# Patient Record
Sex: FEMALE | Race: WHITE | NOT HISPANIC OR LATINO | Employment: UNEMPLOYED | URBAN - METROPOLITAN AREA
[De-identification: names, ages, dates, MRNs, and addresses within clinical notes are randomized per-mention and may not be internally consistent; named-entity substitution may affect disease eponyms.]

---
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
## 2022-02-13 ENCOUNTER — APPOINTMENT (OUTPATIENT)
Dept: RADIOLOGY | Facility: HOSPITAL | Age: 22
End: 2022-02-13
Attending: INTERNAL MEDICINE
Payer: COMMERCIAL

## 2022-02-13 ENCOUNTER — HOSPITAL ENCOUNTER (INPATIENT)
Facility: HOSPITAL | Age: 22
LOS: 2 days | Discharge: HOME | End: 2022-02-15
Attending: EMERGENCY MEDICINE | Admitting: INTERNAL MEDICINE
Payer: COMMERCIAL

## 2022-02-13 ENCOUNTER — APPOINTMENT (OUTPATIENT)
Dept: CT IMAGING | Facility: HOSPITAL | Age: 22
End: 2022-02-13
Attending: INTERNAL MEDICINE
Payer: COMMERCIAL

## 2022-02-13 DIAGNOSIS — R00.0 SINUS TACHYCARDIA: ICD-10-CM

## 2022-02-13 DIAGNOSIS — E87.29 HIGH ANION GAP METABOLIC ACIDOSIS: ICD-10-CM

## 2022-02-13 DIAGNOSIS — E16.2 HYPOGLYCEMIA: ICD-10-CM

## 2022-02-13 DIAGNOSIS — K92.2 UGIB (UPPER GASTROINTESTINAL BLEED): ICD-10-CM

## 2022-02-13 DIAGNOSIS — E87.20 LACTIC ACIDOSIS: ICD-10-CM

## 2022-02-13 DIAGNOSIS — E87.29 ALCOHOLIC KETOACIDOSIS: Primary | ICD-10-CM

## 2022-02-13 LAB
ABO/RH(D): NORMAL
ALBUMIN SERPL-MCNC: 5.3 G/DL (ref 3.5–5)
ALBUMIN UR-MCNC: 20 MG/DL
ALP SERPL-CCNC: 115 U/L (ref 45–120)
ALT SERPL W P-5'-P-CCNC: 39 U/L (ref 0–45)
AMORPH CRY #/AREA URNS HPF: ABNORMAL /HPF
AMPHETAMINES UR QL SCN: ABNORMAL
ANION GAP SERPL CALCULATED.3IONS-SCNC: 31 MMOL/L (ref 5–18)
ANTIBODY SCREEN: NEGATIVE
APPEARANCE UR: CLEAR
APTT PPP: 25 SECONDS (ref 22–38)
AST SERPL W P-5'-P-CCNC: 60 U/L (ref 0–40)
BACTERIA #/AREA URNS HPF: ABNORMAL /HPF
BARBITURATES UR QL: ABNORMAL
BASOPHILS # BLD AUTO: 0.1 10E3/UL (ref 0–0.2)
BASOPHILS NFR BLD AUTO: 0 %
BENZODIAZ UR QL: ABNORMAL
BILIRUB DIRECT SERPL-MCNC: 0.2 MG/DL
BILIRUB SERPL-MCNC: 0.4 MG/DL (ref 0–1)
BILIRUB UR QL STRIP: NEGATIVE
BUN SERPL-MCNC: 13 MG/DL (ref 8–22)
CALCIUM SERPL-MCNC: 9.7 MG/DL (ref 8.5–10.5)
CANNABINOIDS UR QL SCN: ABNORMAL
CHLORIDE BLD-SCNC: 102 MMOL/L (ref 98–107)
CO2 SERPL-SCNC: 10 MMOL/L (ref 22–31)
COCAINE UR QL: ABNORMAL
COLOR UR AUTO: COLORLESS
CREAT SERPL-MCNC: 0.94 MG/DL (ref 0.6–1.1)
CREAT UR-MCNC: 33 MG/DL
EOSINOPHIL # BLD AUTO: 0 10E3/UL (ref 0–0.7)
EOSINOPHIL NFR BLD AUTO: 0 %
ERYTHROCYTE [DISTWIDTH] IN BLOOD BY AUTOMATED COUNT: 12.5 % (ref 10–15)
ETHANOL SERPL-MCNC: 31 MG/DL
GFR SERPL CREATININE-BSD FRML MDRD: 89 ML/MIN/1.73M2
GLUCOSE BLD-MCNC: 51 MG/DL (ref 70–125)
GLUCOSE BLDC GLUCOMTR-MCNC: 153 MG/DL (ref 70–99)
GLUCOSE UR STRIP-MCNC: 30 MG/DL
HCG SERPL QL: NEGATIVE
HCT VFR BLD AUTO: 45.5 % (ref 35–47)
HGB BLD-MCNC: 12.3 G/DL (ref 11.7–15.7)
HGB BLD-MCNC: 14.3 G/DL (ref 11.7–15.7)
HGB UR QL STRIP: ABNORMAL
HOLD SPECIMEN: NORMAL
IMM GRANULOCYTES # BLD: 0.2 10E3/UL
IMM GRANULOCYTES NFR BLD: 1 %
INR PPP: 1.07 (ref 0.9–1.15)
KETONES UR STRIP-MCNC: 150 MG/DL
LACTATE SERPL-SCNC: 3.9 MMOL/L (ref 0.7–2)
LACTATE SERPL-SCNC: 9.8 MMOL/L (ref 0.7–2)
LEUKOCYTE ESTERASE UR QL STRIP: NEGATIVE
LIPASE SERPL-CCNC: 28 U/L (ref 0–52)
LYMPHOCYTES # BLD AUTO: 1.8 10E3/UL (ref 0.8–5.3)
LYMPHOCYTES NFR BLD AUTO: 9 %
MAGNESIUM SERPL-MCNC: 2.4 MG/DL (ref 1.8–2.6)
MCH RBC QN AUTO: 32.6 PG (ref 26.5–33)
MCHC RBC AUTO-ENTMCNC: 31.4 G/DL (ref 31.5–36.5)
MCV RBC AUTO: 104 FL (ref 78–100)
MONOCYTES # BLD AUTO: 1.3 10E3/UL (ref 0–1.3)
MONOCYTES NFR BLD AUTO: 6 %
MUCOUS THREADS #/AREA URNS LPF: PRESENT /LPF
NEUTROPHILS # BLD AUTO: 17.6 10E3/UL (ref 1.6–8.3)
NEUTROPHILS NFR BLD AUTO: 84 %
NITRATE UR QL: NEGATIVE
NRBC # BLD AUTO: 0 10E3/UL
NRBC BLD AUTO-RTO: 0 /100
OPIATES UR QL SCN: ABNORMAL
OXYCODONE UR QL: ABNORMAL
PCP UR QL SCN: ABNORMAL
PH UR STRIP: 5.5 [PH] (ref 5–7)
PHOSPHATE SERPL-MCNC: 6.8 MG/DL (ref 2.5–4.5)
PLATELET # BLD AUTO: 362 10E3/UL (ref 150–450)
POTASSIUM BLD-SCNC: 4.3 MMOL/L (ref 3.5–5)
PROT SERPL-MCNC: 8.6 G/DL (ref 6–8)
RBC # BLD AUTO: 4.38 10E6/UL (ref 3.8–5.2)
RBC URINE: 1 /HPF
SARS-COV-2 RNA RESP QL NAA+PROBE: NEGATIVE
SODIUM SERPL-SCNC: 143 MMOL/L (ref 136–145)
SP GR UR STRIP: 1.02 (ref 1–1.03)
SPECIMEN EXPIRATION DATE: NORMAL
SQUAMOUS EPITHELIAL: 3 /HPF
TRANSITIONAL EPI: <1 /HPF
TSH SERPL DL<=0.005 MIU/L-ACNC: 0.56 UIU/ML (ref 0.3–5)
UROBILINOGEN UR STRIP-MCNC: <2 MG/DL
WBC # BLD AUTO: 20.8 10E3/UL (ref 4–11)
WBC URINE: 3 /HPF

## 2022-02-13 PROCEDURE — 86901 BLOOD TYPING SEROLOGIC RH(D): CPT | Performed by: EMERGENCY MEDICINE

## 2022-02-13 PROCEDURE — 96366 THER/PROPH/DIAG IV INF ADDON: CPT

## 2022-02-13 PROCEDURE — 258N000001 HC RX 258: Performed by: EMERGENCY MEDICINE

## 2022-02-13 PROCEDURE — 99223 1ST HOSP IP/OBS HIGH 75: CPT | Performed by: INTERNAL MEDICINE

## 2022-02-13 PROCEDURE — 80307 DRUG TEST PRSMV CHEM ANLYZR: CPT | Performed by: INTERNAL MEDICINE

## 2022-02-13 PROCEDURE — 258N000003 HC RX IP 258 OP 636: Performed by: EMERGENCY MEDICINE

## 2022-02-13 PROCEDURE — 87040 BLOOD CULTURE FOR BACTERIA: CPT | Performed by: EMERGENCY MEDICINE

## 2022-02-13 PROCEDURE — 96365 THER/PROPH/DIAG IV INF INIT: CPT | Mod: 59

## 2022-02-13 PROCEDURE — 85018 HEMOGLOBIN: CPT | Performed by: INTERNAL MEDICINE

## 2022-02-13 PROCEDURE — 82077 ASSAY SPEC XCP UR&BREATH IA: CPT | Performed by: EMERGENCY MEDICINE

## 2022-02-13 PROCEDURE — 85730 THROMBOPLASTIN TIME PARTIAL: CPT | Performed by: EMERGENCY MEDICINE

## 2022-02-13 PROCEDURE — 84443 ASSAY THYROID STIM HORMONE: CPT | Performed by: INTERNAL MEDICINE

## 2022-02-13 PROCEDURE — 258N000003 HC RX IP 258 OP 636: Performed by: INTERNAL MEDICINE

## 2022-02-13 PROCEDURE — 84100 ASSAY OF PHOSPHORUS: CPT | Performed by: INTERNAL MEDICINE

## 2022-02-13 PROCEDURE — 87635 SARS-COV-2 COVID-19 AMP PRB: CPT | Performed by: EMERGENCY MEDICINE

## 2022-02-13 PROCEDURE — 85025 COMPLETE CBC W/AUTO DIFF WBC: CPT | Performed by: EMERGENCY MEDICINE

## 2022-02-13 PROCEDURE — 96361 HYDRATE IV INFUSION ADD-ON: CPT

## 2022-02-13 PROCEDURE — 85610 PROTHROMBIN TIME: CPT | Performed by: EMERGENCY MEDICINE

## 2022-02-13 PROCEDURE — 83690 ASSAY OF LIPASE: CPT | Performed by: EMERGENCY MEDICINE

## 2022-02-13 PROCEDURE — 81001 URINALYSIS AUTO W/SCOPE: CPT | Performed by: INTERNAL MEDICINE

## 2022-02-13 PROCEDURE — 250N000011 HC RX IP 250 OP 636: Performed by: EMERGENCY MEDICINE

## 2022-02-13 PROCEDURE — 250N000013 HC RX MED GY IP 250 OP 250 PS 637: Performed by: INTERNAL MEDICINE

## 2022-02-13 PROCEDURE — 93005 ELECTROCARDIOGRAM TRACING: CPT | Performed by: EMERGENCY MEDICINE

## 2022-02-13 PROCEDURE — 120N000001 HC R&B MED SURG/OB

## 2022-02-13 PROCEDURE — 36415 COLL VENOUS BLD VENIPUNCTURE: CPT | Performed by: EMERGENCY MEDICINE

## 2022-02-13 PROCEDURE — 99285 EMERGENCY DEPT VISIT HI MDM: CPT | Mod: 25

## 2022-02-13 PROCEDURE — 84703 CHORIONIC GONADOTROPIN ASSAY: CPT | Performed by: STUDENT IN AN ORGANIZED HEALTH CARE EDUCATION/TRAINING PROGRAM

## 2022-02-13 PROCEDURE — C9803 HOPD COVID-19 SPEC COLLECT: HCPCS

## 2022-02-13 PROCEDURE — 83735 ASSAY OF MAGNESIUM: CPT | Performed by: INTERNAL MEDICINE

## 2022-02-13 PROCEDURE — C9113 INJ PANTOPRAZOLE SODIUM, VIA: HCPCS | Performed by: EMERGENCY MEDICINE

## 2022-02-13 PROCEDURE — 250N000011 HC RX IP 250 OP 636: Performed by: INTERNAL MEDICINE

## 2022-02-13 PROCEDURE — 74177 CT ABD & PELVIS W/CONTRAST: CPT

## 2022-02-13 PROCEDURE — 71045 X-RAY EXAM CHEST 1 VIEW: CPT

## 2022-02-13 PROCEDURE — 36415 COLL VENOUS BLD VENIPUNCTURE: CPT | Performed by: INTERNAL MEDICINE

## 2022-02-13 PROCEDURE — 83605 ASSAY OF LACTIC ACID: CPT | Performed by: EMERGENCY MEDICINE

## 2022-02-13 PROCEDURE — 250N000009 HC RX 250: Performed by: INTERNAL MEDICINE

## 2022-02-13 PROCEDURE — 82248 BILIRUBIN DIRECT: CPT | Performed by: EMERGENCY MEDICINE

## 2022-02-13 RX ORDER — OLANZAPINE 5 MG/1
5-10 TABLET, ORALLY DISINTEGRATING ORAL EVERY 6 HOURS PRN
Status: DISCONTINUED | OUTPATIENT
Start: 2022-02-13 | End: 2022-02-15 | Stop reason: HOSPADM

## 2022-02-13 RX ORDER — ONDANSETRON 4 MG/1
4 TABLET, ORALLY DISINTEGRATING ORAL EVERY 6 HOURS PRN
Status: DISCONTINUED | OUTPATIENT
Start: 2022-02-13 | End: 2022-02-15 | Stop reason: HOSPADM

## 2022-02-13 RX ORDER — FLUMAZENIL 0.1 MG/ML
0.2 INJECTION, SOLUTION INTRAVENOUS
Status: DISCONTINUED | OUTPATIENT
Start: 2022-02-13 | End: 2022-02-15 | Stop reason: HOSPADM

## 2022-02-13 RX ORDER — TRAZODONE HYDROCHLORIDE 50 MG/1
50 TABLET, FILM COATED ORAL
COMMUNITY
End: 2022-12-20

## 2022-02-13 RX ORDER — ACETAMINOPHEN 325 MG/1
650 TABLET ORAL EVERY 6 HOURS PRN
Status: DISCONTINUED | OUTPATIENT
Start: 2022-02-13 | End: 2022-02-15 | Stop reason: HOSPADM

## 2022-02-13 RX ORDER — IBUPROFEN 200 MG
200-400 TABLET ORAL EVERY 6 HOURS PRN
Status: ON HOLD | COMMUNITY
End: 2022-02-15

## 2022-02-13 RX ORDER — LIDOCAINE 40 MG/G
CREAM TOPICAL
Status: DISCONTINUED | OUTPATIENT
Start: 2022-02-13 | End: 2022-02-15 | Stop reason: HOSPADM

## 2022-02-13 RX ORDER — LORAZEPAM 0.5 MG/1
1-2 TABLET ORAL EVERY 30 MIN PRN
Status: DISCONTINUED | OUTPATIENT
Start: 2022-02-13 | End: 2022-02-15 | Stop reason: HOSPADM

## 2022-02-13 RX ORDER — CEFTRIAXONE 1 G/1
1 INJECTION, POWDER, FOR SOLUTION INTRAMUSCULAR; INTRAVENOUS ONCE
Status: COMPLETED | OUTPATIENT
Start: 2022-02-13 | End: 2022-02-13

## 2022-02-13 RX ORDER — LORAZEPAM 2 MG/ML
1-2 INJECTION INTRAMUSCULAR EVERY 30 MIN PRN
Status: DISCONTINUED | OUTPATIENT
Start: 2022-02-13 | End: 2022-02-15 | Stop reason: HOSPADM

## 2022-02-13 RX ORDER — ONDANSETRON 2 MG/ML
4 INJECTION INTRAMUSCULAR; INTRAVENOUS ONCE
Status: COMPLETED | OUTPATIENT
Start: 2022-02-13 | End: 2022-02-13

## 2022-02-13 RX ORDER — ONDANSETRON 2 MG/ML
4 INJECTION INTRAMUSCULAR; INTRAVENOUS EVERY 6 HOURS PRN
Status: DISCONTINUED | OUTPATIENT
Start: 2022-02-13 | End: 2022-02-15 | Stop reason: HOSPADM

## 2022-02-13 RX ORDER — HALOPERIDOL 5 MG/ML
2.5-5 INJECTION INTRAMUSCULAR EVERY 6 HOURS PRN
Status: DISCONTINUED | OUTPATIENT
Start: 2022-02-13 | End: 2022-02-15 | Stop reason: HOSPADM

## 2022-02-13 RX ORDER — OXYCODONE HYDROCHLORIDE 5 MG/1
5 TABLET ORAL EVERY 4 HOURS PRN
Status: DISCONTINUED | OUTPATIENT
Start: 2022-02-13 | End: 2022-02-15 | Stop reason: HOSPADM

## 2022-02-13 RX ORDER — DEXTROSE MONOHYDRATE 25 G/50ML
50 INJECTION, SOLUTION INTRAVENOUS ONCE
Status: COMPLETED | OUTPATIENT
Start: 2022-02-13 | End: 2022-02-13

## 2022-02-13 RX ORDER — SODIUM CHLORIDE 9 MG/ML
INJECTION, SOLUTION INTRAVENOUS CONTINUOUS
Status: DISCONTINUED | OUTPATIENT
Start: 2022-02-13 | End: 2022-02-15

## 2022-02-13 RX ORDER — CEFTRIAXONE 1 G/1
1 INJECTION, POWDER, FOR SOLUTION INTRAMUSCULAR; INTRAVENOUS EVERY 24 HOURS
Status: DISCONTINUED | OUTPATIENT
Start: 2022-02-14 | End: 2022-02-15

## 2022-02-13 RX ORDER — IOPAMIDOL 755 MG/ML
100 INJECTION, SOLUTION INTRAVASCULAR ONCE
Status: COMPLETED | OUTPATIENT
Start: 2022-02-13 | End: 2022-02-13

## 2022-02-13 RX ORDER — TRAZODONE HYDROCHLORIDE 50 MG/1
50 TABLET, FILM COATED ORAL
Status: DISCONTINUED | OUTPATIENT
Start: 2022-02-13 | End: 2022-02-15 | Stop reason: HOSPADM

## 2022-02-13 RX ORDER — ACETAMINOPHEN 650 MG/1
650 SUPPOSITORY RECTAL EVERY 6 HOURS PRN
Status: DISCONTINUED | OUTPATIENT
Start: 2022-02-13 | End: 2022-02-15 | Stop reason: HOSPADM

## 2022-02-13 RX ADMIN — ACETAMINOPHEN 650 MG: 325 TABLET ORAL at 17:42

## 2022-02-13 RX ADMIN — SODIUM CHLORIDE, POTASSIUM CHLORIDE, SODIUM LACTATE AND CALCIUM CHLORIDE 1000 ML: 600; 310; 30; 20 INJECTION, SOLUTION INTRAVENOUS at 15:17

## 2022-02-13 RX ADMIN — DEXTROSE MONOHYDRATE 50 ML: 25 INJECTION, SOLUTION INTRAVENOUS at 15:31

## 2022-02-13 RX ADMIN — OXYCODONE HYDROCHLORIDE 5 MG: 5 TABLET ORAL at 17:47

## 2022-02-13 RX ADMIN — SODIUM CHLORIDE, POTASSIUM CHLORIDE, SODIUM LACTATE AND CALCIUM CHLORIDE 1000 ML: 600; 310; 30; 20 INJECTION, SOLUTION INTRAVENOUS at 14:24

## 2022-02-13 RX ADMIN — THIAMINE HYDROCHLORIDE: 100 INJECTION, SOLUTION INTRAMUSCULAR; INTRAVENOUS at 20:05

## 2022-02-13 RX ADMIN — ONDANSETRON 4 MG: 2 INJECTION INTRAMUSCULAR; INTRAVENOUS at 14:33

## 2022-02-13 RX ADMIN — SODIUM CHLORIDE 8 MG/HR: 9 INJECTION, SOLUTION INTRAVENOUS at 14:45

## 2022-02-13 RX ADMIN — PANTOPRAZOLE SODIUM 40 MG: 40 INJECTION, POWDER, FOR SOLUTION INTRAVENOUS at 14:35

## 2022-02-13 RX ADMIN — SODIUM CHLORIDE: 9 INJECTION, SOLUTION INTRAVENOUS at 17:46

## 2022-02-13 RX ADMIN — IOPAMIDOL 100 ML: 755 INJECTION, SOLUTION INTRAVENOUS at 18:52

## 2022-02-13 RX ADMIN — CEFTRIAXONE SODIUM 1 G: 1 INJECTION, POWDER, FOR SOLUTION INTRAMUSCULAR; INTRAVENOUS at 16:38

## 2022-02-13 ASSESSMENT — ACTIVITIES OF DAILY LIVING (ADL)
ADLS_ACUITY_SCORE: 12
DEPENDENT_IADLS:: TRANSPORTATION
ADLS_ACUITY_SCORE: 12

## 2022-02-13 ASSESSMENT — MIFFLIN-ST. JEOR: SCORE: 1213.6

## 2022-02-13 NOTE — PHARMACY-ADMISSION MEDICATION HISTORY
Pharmacy Note - Admission Medication History    Pertinent Provider Information: none     ______________________________________________________________________    Prior To Admission (PTA) med list completed and updated in EMR.       PTA Med List   Medication Sig Last Dose     ibuprofen (ADVIL/MOTRIN) 200 MG tablet Take 200-400 mg by mouth every 6 hours as needed for mild pain Past Month at Unknown time     omeprazole (PRILOSEC) 20 MG DR capsule Take 20 mg by mouth daily as needed Past Week at Unknown time     traZODone (DESYREL) 50 MG tablet Take 50 mg by mouth nightly as needed for sleep Past Week at Unknown time       Information source(s): [facility]  Method of interview communication: in-person    Summary of Changes to PTA Med List  New: prn use of omeprazole, trazodone and ibu  Discontinued: Xanax  Changed: none    Patient was asked about OTC/herbal products specifically.  PTA med list reflects this.    In the past week, patient estimated taking medication this percent of the time:  All prn    Allergies were reviewed, assessed, and updated with the patient.      Patient does not use any multi-dose medications prior to admission.    The information provided in this note is only as accurate as the sources available at the time of the update(s).    Thank you for the opportunity to participate in the care of this patient.    Reyna Dior [facility]  2/13/2022 4:20 PM

## 2022-02-13 NOTE — ED PROVIDER NOTES
EMERGENCY DEPARTMENT ENCOUNTER      NAME: Julita Fernandez  AGE: 21 year old female  YOB: 2000  MRN: [id number]  EVALUATION DATE & TIME: 2/13/2022  1:59 PM    PCP: Brooklynn Lazcano    ED PROVIDER: Oralia Kumar MD    Chief Complaint   Patient presents with     Hematemesis         FINAL IMPRESSION:  1. Alcoholic ketoacidosis    2. Sinus tachycardia    3. Lactic acidosis    4. UGIB (upper gastrointestinal bleed)    5. High anion gap metabolic acidosis    6. Hypoglycemia          ED COURSE & MEDICAL DECISION MAKING:    Pertinent Labs & Imaging studies reviewed. (See chart for details)  21 year old female with history of GERD, anxiety who presents to the Emergency Department for evaluation of palpitations, nausea vomiting and now hematemesis after drinking alcohol heavily last night.  Differential includes dehydration, electrolyte abnormality, alcoholic gastritis.  Patient describes initially vomiting clear to yellow stomach acid and as she has been puking throughout the morning developing some hematemesis.  Irma-Ro tears on the differential but she is not having any bright red hematemesis here, having only small scant coffee-ground emesis.    Patient placed on monitor, given 2 L LR bolus, 8 mg Zofran, IV PPI bolus and drip.  CBC, BMP, LFTs, lipase, blood alcohol level, coags, type and screen, lactate notable for WBC of 20.8, lactate of 9.8.  Blood cultures sent and covered with Rocephin.  Glucose of 51.  Anion gap acidosis with bicarb of 10, gap of 21.  I suspect much of patient's laboratory abnormalities are related to dehydration, stress demargination, and alcoholic ketoacidosis.  That being said I do think patient warrants admission for the above, additional monitoring for upper GI bleed.  Heart rate normalizing after the above.  Repeat lactate pending.  Will be admitted to medicine for further management.        ED Course as of 02/13/22 1617   Sun Feb 13, 2022   1435 Lactic Acid(!!): 9.8    1438 WBC(!): 20.8   1449 5ml coffee ground emesis   1450 Feels improved, HR down into 110s   1502 Glucose(!!): 51   1502 Hemoglobin: 14.3   1502 Alcohol, Blood(!): 31   1502 Carbon Dioxide(!): 10   1502 Anion Gap(!): 31   1617 Admitted to Dr. Alcantar       2:05 PM I met with the patient, obtained history, performed an initial exam, and discussed options and plan for diagnostics and treatment here in the ED. PPE worn: N95 mask, eye protection, gloves    At the conclusion of the encounter I discussed the results of all of the tests and the disposition. The questions were answered. The patient or family acknowledged understanding and was agreeable with the care plan.    CRITICAL CARE:  Critical Care  Performed by: Oralia Kumar MD  Authorized by: Oralia Kumar MD     Total critical care time: 44 minutes  Criticalcare time was exclusive of separately billable procedures and treating other patients.  Critical care was necessary to treat or prevent imminent or life-threatening deterioration of the following conditions: Metabolic decompensation, death, disability  Critical care was time spent personally by me on the following activities: development of treatment plan with patient or surrogate, discussions with consultants, examination of patient, evaluation of patient's response totreatment, obtaining history from patient or surrogate, ordering and performing treatments and interventions, ordering and review of laboratory studies, ordering and review of radiographic studies and re-evaluation ofpatient's condition, this excludes any separately billable procedures.      MEDICATIONS GIVEN IN THE EMERGENCY:  Medications   pantoprazole (PROTONIX) 80 mg in sodium chloride 0.9 % 100 mL infusion (8 mg/hr Intravenous New Bag 2/13/22 1445)   cefTRIAXone (ROCEPHIN) 1 g vial to attach to  mL bag for ADULTS or NS 50 mL bag for PEDS (has no administration in time range)   lactated ringers BOLUS 1,000 mL (0 mLs Intravenous  Stopped 2/13/22 1509)   pantoprazole (PROTONIX) IV push injection 40 mg (40 mg Intravenous Given 2/13/22 1435)   ondansetron (ZOFRAN) injection 4 mg (4 mg Intravenous Given 2/13/22 1433)   lactated ringers BOLUS 1,000 mL (1,000 mLs Intravenous New Bag 2/13/22 1517)   dextrose 50 % injection 50 mL (50 mLs Intravenous Given 2/13/22 1531)       NEW PRESCRIPTIONS STARTED AT TODAY'S ER VISIT  New Prescriptions    No medications on file          =================================================================    HPI    Patient information was obtained from: Patient     Use of Intrepreter: N/A        Julita Fernandez is a 21 year old female with pertinent medical history of GERD who presents hematemesis.    The patient reports going to [employer] in [place] this past semester where she heavily drank alcohol on a daily basis. She moved back here in December 2021 and has not drank since. However, yesterday night, the patient states drinking half a liter of vodka. She went to sleep with no complications, but when she woke up this morning, she felt intoxicated and nauseated. The patient reports of 10-15 episodes of emesis. Approximately one hour prior to arrival, the patient started having hematemesis. Initially the patient thought that she was vomiting the strawberry she recently consumed, but noted that she only ate one strawberry, thus the concern of hematemesis. The patient has had approximately 15 mL of hematemesis here in the ED. The patient has had a history of GERD, but is not currently on any medication since it has been controlled. However, the patient does report taking omeprazole this morning, but has vomited the medication. No other symptoms or complaints at this time.       REVIEW OF SYSTEMS  Constitutional:  Denies fever, chills, weight loss or weakness  Respiratory: No SOB, wheeze or cough  Cardiovascular:  No CP, palpitations  GI: Positive for nausea and vomiting. Positive for hematemesis. Denies abdominal pain,  "diarrhea  Musculoskeletal:  Denies any new muscle/joint pain, swelling or loss of function.  Neurologic:  Denies headache, focal weakness or sensory changes    All other systems negative unless noted in HPI.      PAST MEDICAL HISTORY:  Past Medical History:   Diagnosis Date     Anxiety      Gastroesophageal reflux disease        PAST SURGICAL HISTORY:  History reviewed. No pertinent surgical history.    CURRENT MEDICATIONS:    Prior to Admission Medications   Prescriptions Last Dose Informant Patient Reported? Taking?   ALPRAZolam (XANAX) 0.5 MG tablet   No No   Sig: Take 1 tablet (0.5 mg) by mouth 2 times daily as needed for anxiety   levonorgestrel (MIRENA) 20 mcg/24 hours (5 yrs) 52 mg IUD   No No   Sig: [LEVONORGESTREL (MIRENA) 20 MCG/24 HOURS (5 YRS) 52 MG IUD] by Intrauterine route once for 1 dose.      Facility-Administered Medications: None       ALLERGIES:  Allergies   Allergen Reactions     Soy [Isoflavones] Unknown       FAMILY HISTORY:  History reviewed. No pertinent family history.    SOCIAL HISTORY:  Social History     Tobacco Use     Smoking status: Never Smoker     Smokeless tobacco: Never Used   Substance Use Topics     Alcohol use: None     Drug use: None        VITALS:  Patient Vitals for the past 24 hrs:   BP Temp Temp src Pulse Resp SpO2 Height Weight   02/13/22 1545 98/53 -- -- 113 27 100 % -- --   02/13/22 1531 -- -- -- 108 -- -- -- --   02/13/22 1500 106/58 -- -- 114 22 100 % -- --   02/13/22 1445 106/57 -- -- 116 27 100 % -- --   02/13/22 1430 101/56 -- -- 118 (!) 39 100 % -- --   02/13/22 1416 118/61 -- -- (!) 123 25 -- -- --   02/13/22 1335 106/61 98.2  F (36.8  C) Oral (!) 133 -- 99 % 1.626 m (5' 4\") 45.4 kg (100 lb)       PHYSICAL EXAM    General Appearance: Well-appearing, well-nourished, no acute distress, looks dehydrated   Head:  Normocephalic  Eyes:  conjunctiva/corneas clear  ENT:  membranes are moist without pallor  Neck:  Supple  Chest:  No tenderness or deformity, no " crepitus  Cardio: Tachycardic in the 120's. Regular rate, no murmur/gallop/rub, 2+ pulses symmetric in all extremities  Pulm:  No respiratory distress, clear to auscultation bilaterally  Abdomen:  Soft, non-tender, non distended,no rebound or guarding.  Extremities: Moves all extremities normally, normal gait  Skin:  Skin warm, dry, no rashes  Neuro:  Alert and oriented ×3, moving all extremities, no gross sensory defects     RADIOLOGY/LABS:  Reviewed all pertinent imaging. Please see official radiology report. All pertinent labs reviewed and interpreted.    Results for orders placed or performed during the hospital encounter of 02/13/22   Result Value Ref Range    INR 1.07 0.90 - 1.15   Partial thromboplastin time   Result Value Ref Range    aPTT 25 22 - 38 Seconds   Basic metabolic panel   Result Value Ref Range    Sodium 143 136 - 145 mmol/L    Potassium 4.3 3.5 - 5.0 mmol/L    Chloride 102 98 - 107 mmol/L    Carbon Dioxide (CO2) 10 (L) 22 - 31 mmol/L    Anion Gap 31 (H) 5 - 18 mmol/L    Urea Nitrogen 13 8 - 22 mg/dL    Creatinine 0.94 0.60 - 1.10 mg/dL    Calcium 9.7 8.5 - 10.5 mg/dL    Glucose 51 (LL) 70 - 125 mg/dL    GFR Estimate 89 >60 mL/min/1.73m2   Hepatic panel   Result Value Ref Range    Bilirubin Total 0.4 0.0 - 1.0 mg/dL    Bilirubin Direct 0.2 <=0.5 mg/dL    Protein Total 8.6 (H) 6.0 - 8.0 g/dL    Albumin 5.3 (H) 3.5 - 5.0 g/dL    Alkaline Phosphatase 115 45 - 120 U/L    AST 60 (H) 0 - 40 U/L    ALT 39 0 - 45 U/L   Result Value Ref Range    Lipase 28 0 - 52 U/L   Lactic acid whole blood   Result Value Ref Range    Lactic Acid 9.8 (HH) 0.7 - 2.0 mmol/L   Alcohol level blood   Result Value Ref Range    Alcohol, Blood 31 (H) None detected mg/dL   CBC with platelets and differential   Result Value Ref Range    WBC Count 20.8 (H) 4.0 - 11.0 10e3/uL    RBC Count 4.38 3.80 - 5.20 10e6/uL    Hemoglobin 14.3 11.7 - 15.7 g/dL    Hematocrit 45.5 35.0 - 47.0 %     (H) 78 - 100 fL    MCH 32.6 26.5 - 33.0  pg    MCHC 31.4 (L) 31.5 - 36.5 g/dL    RDW 12.5 10.0 - 15.0 %    Platelet Count 362 150 - 450 10e3/uL    % Neutrophils 84 %    % Lymphocytes 9 %    % Monocytes 6 %    % Eosinophils 0 %    % Basophils 0 %    % Immature Granulocytes 1 %    NRBCs per 100 WBC 0 <1 /100    Absolute Neutrophils 17.6 (H) 1.6 - 8.3 10e3/uL    Absolute Lymphocytes 1.8 0.8 - 5.3 10e3/uL    Absolute Monocytes 1.3 0.0 - 1.3 10e3/uL    Absolute Eosinophils 0.0 0.0 - 0.7 10e3/uL    Absolute Basophils 0.1 0.0 - 0.2 10e3/uL    Absolute Immature Granulocytes 0.2 <=0.4 10e3/uL    Absolute NRBCs 0.0 10e3/uL   Extra Red Top Tube   Result Value Ref Range    Hold Specimen [facility]    Adult Type and Screen   Result Value Ref Range    ABO/RH(D) A POS     Antibody Screen Negative Negative    SPECIMEN EXPIRATION DATE [id number]        EKG:  Performed at: 13-FEB-2022 14:23:16    Impression: Otherwise normal ECG    Rate: 121 bpm  Rhythm: Sinus tachycardia   Axis: 88  WY Interval: 122 ms  QRS Interval: 122 ms  QTc Interval: 477 ms  Comparison: No previous ECG  I have independently reviewed and interpreted the EKG(s) documented above.      The creation of this record is based on the scribe s observations of the work being performed by Oralia Kumar MD and the provider s statements to them. It was created on his behalf by Beka Main, a trained medical scribe. This document has been checked and approved by the attending provider.    Oralia Kumar MD  Emergency Medicine  [facility] EMERGENCY DEPARTMENT  [facility] [facility] [id number] [phone]  Dept: [phone]     Oralia Kumar MD  02/13/22 9992

## 2022-02-13 NOTE — H&P
[facility]    History and Physical - Hospitalist Service      Assessment and Plan  Active Problems:    Sinus tachycardia    Lactic acidosis    Alcoholic ketoacidosis    Hypoglycemia    High anion gap metabolic acidosis    UGIB (upper gastrointestinal bleed)      21 year old female with past medical history of  alcohol abuse, GERD and depression who presents to the ER for the evaluation of hematemesis.  She was admitted with    Severe lactic acidosis  - Probably combination of severe dehydration, hematemesis and alcohol dependence  - Patient presented with lactic acid 9.7 and looks extremely dry  - Received 2 L vigorous IV hydration in ER, lactic acid is improving to 3.9  - Continue IV fluid support  - Follow-up on blood culture  - Check urinalysis and chest x-ray  - Continue to monitor lactic acid level  - Continue IV Rocephin empirically    Nausea and vomiting with hematemesis  - Probably Irma-Ro tear secondary to vomiting versus GERD  - Stable hemoglobin on admission the patient is very dry  - Continue to check hemoglobin every 6 hours  - Started on Protonix drip in ER, continue  - Consider GI consult if hemoglobin drops  - Keep patient n.p.o. for now    Alcohol dependence  - Patient presented with alcohol level of 31  - Start CIWA protocol  - Continue IV fluid support  - Social service consult for chemical dependence  - Check urine drug screen    Abdominal pain  - Probably secondary to intractable nausea and vomiting  - Patient complains of back pain  - Normal lipase  - Check CT abdomen    Leukocytosis  - Probably reactive secondary to above  - Check chest x-ray and urinalysis  - Check CT abdomen  - Continue IV Rocephin empirically  - Follow-up on blood culture    Tachycardia  - Probably reactive to the above  - EKG shows sinus tachycardia  - Check TSH   - Continue to monitor on telemetry    History of depression  - Patient currently not take medications    Insomnia  - Resume  trazodone as needed.      COVID STATUS: Negative date: 2/13/2022    VTE prophylaxis:  Pneumatic Compression Devices  DIET: Orders Placed This Encounter      NPO for Medical/Clinical Reasons Except for: Ice Chips, Meds      Disposition/Barriers to discharge: IV fluid, antibiotics, pending culture  Code Status:Full Code    HPI:   Julita Fernandez is a 21 year old female with past medical history of  alcohol abuse, GERD and depression who presents to the ER for the evaluation of hematemesis.  Basically the patient reports going to [employer] in [place] this past semester where she heavily drank alcohol on a daily basis. She moved back here in December 2021 and has not drank since. However, yesterday night, the patient states drinking half a liter of vodka. She went to sleep with no complications, but when she woke up this morning, she felt intoxicated and nauseated. The patient reports of 10-15 episodes of emesis. Approximately one hour prior to arrival, the patient started having hematemesis. Initially the patient thought that she was vomiting the strawberry she recently consumed, but noted that she only ate one strawberry, thus the concern of hematemesis. The patient has had approximately 15 mL of hematemesis here in the ED. The patient has had a history of GERD, but is not currently on any medication since it has been controlled. However, the patient does report taking omeprazole this morning, but has vomited the medication. No other symptoms or complaints at this time.  In the ER patient was found to have lactic acid of 9.7      Past Medical History:   Diagnosis Date     Anxiety      Gastroesophageal reflux disease      History reviewed. No pertinent surgical history.  History reviewed. No pertinent family history.  Social History     Socioeconomic History     Marital status: Single     Spouse name: Not on file     Number of children: Not on file     Years of education: Not on file     Highest education level: Not on  file   Occupational History     Not on file   Tobacco Use     Smoking status: Never Smoker     Smokeless tobacco: Never Used   Substance and Sexual Activity     Alcohol use: Not on file     Drug use: Not on file     Sexual activity: Not on file   Other Topics Concern     Not on file   Social History Narrative     Not on file     Social Determinants of Health     Financial Resource Strain: Not on file   Food Insecurity: Not on file   Transportation Needs: Not on file   Physical Activity: Not on file   Stress: Not on file   Social Connections: Not on file   Intimate Partner Violence: Not on file   Housing Stability: Not on file     Allergies   Allergen Reactions     Soy [Isoflavones] Other (See Comments)     Facial eczema        PRIOR TO ADMISSION MEDICATIONS   (Not in a hospital admission)       REVIEW OF SYSTEMS:  12 point reviewed pertinent negatives and positives in HPI all others negative     PHYSICAL EXAM  B/P:105/56 T:98.2 P:115 R: 30     Head:    Normocephalic, without obvious abnormality, atraumatic   Eyes:    PERRL, conjunctiva/corneas clear, EOM's intact,both eyes    Ears:    Normal external ear canals no drainage or erythema bilat.   Nose:   Nares normal by gross inspection,  mucosa normal, no drainage or sinus tenderness   Throat:   Lips, mucosa, and tongue normal; teeth and gums normal   Neck:   Supple, symmetrical, trachea midline, no adenopathy;        thyroid:  No enlargement/tenderness/nodules   Back:     Symmetric, no curvature, ROM normal, no CVA tenderness   Lungs:    Decreased breath sounds on lung bases, scattered rhonchi.  No rales.   Chest wall:    No tenderness or deformity   Heart:    Regular rate and rhythm, S1 and S2 normal, I/VI systolic murmur, no rubs, no JVD, no edema   Abdomen:     Soft, non-tender, bowel sounds active all four quadrants,     no masses, no hepatosplenomegaly   Musculoskeletal:   Extremities are warm and non-tender, atraumatic, no joint swelling or tenderness   Pulses:    2+ and symmetric all extremities   Skin:   Skin color, texture, turgor normal, no rashes or lesions on exposed areas, please see nursing assessment for full skin assessment   Neurologic:  Grossly intact, no focal deficits         PERTINENT LABS and RADIOLOGY   Recent Labs   Lab 02/13/22  1617 02/13/22  1424   WBC  --  20.8*   HGB  --  14.3   MCV  --  104*   PLT  --  362   INR  --  1.07   NA  --  143   POTASSIUM  --  4.3   CHLORIDE  --  102   CO2  --  10*   BUN  --  13   CR  --  0.94   ANIONGAP  --  31*   SHREE  --  9.7   * 51*   ALBUMIN  --  5.3*   PROTTOTAL  --  8.6*   BILITOTAL  --  0.4   ALKPHOS  --  115   ALT  --  39   AST  --  60*   LIPASE  --  28     No results found for this or any previous visit (from the past 24 hour(s)).  EKG: Sinus tachycardia    Discussed with patient, family and nursing staff     Osorio Ramirez MD  [facility] Internal Medicine Hospitalist  [phone]

## 2022-02-13 NOTE — CONSULTS
"Care Management Initial Consult    General Information  Assessment completed with: PatientJulita via i pad  Type of CM/SW Visit: Initial Assessment    Primary Care Provider verified and updated as needed: Yes   Readmission within the last 30 days: no previous admission in last 30 days      Reason for Consult: discharge planning  Advance Care Planning: Advance Care Planning Reviewed: other (comment) (\"I don't have one written\")          Communication Assessment  Patient's communication style: spoken language (English or Bilingual)             Cognitive  Cognitive/Neuro/Behavioral: WDL                      Living Environment:   People in home: parent(s),sibling(s) (\"parents and 2 siblings\")     Current living Arrangements: house      Able to return to prior arrangements: yes       Family/Social Support:  Care provided by: self  Provides care for: no one     Parent(s)          Description of Support System: Supportive,Involved    Support Assessment: Adequate family and caregiver support,Adequate social supports,Patient communicates needs well met    Current Resources:   Patient receiving home care services: No     Community Resources: None  Equipment currently used at home: none  Supplies currently used at home: Other (\"glasses\")    Employment/Financial:  Employment Status:  (\"I was supposed to start  part time job today\")     Employment/ Comments: \"no  benefits\"  Financial Concerns:     Referral to Financial Counselor: No       Lifestyle & Psychosocial Needs:  Social Determinants of Health     Tobacco Use: Low Risk      Smoking Tobacco Use: Never Smoker     Smokeless Tobacco Use: Never Used   Alcohol Use: Not on file   Financial Resource Strain: Not on file   Food Insecurity: Not on file   Transportation Needs: Not on file   Physical Activity: Not on file   Stress: Not on file   Social Connections: Not on file   Intimate Partner Violence: Not on file   Depression: At risk     PHQ-2 Score: 4 " "  Housing Stability: Not on file       Functional Status:  Prior to admission patient needed assistance:   Dependent ADLs:: Independent,Ambulation-no assistive device  Dependent IADLs:: Transportation (\"I don't drive; no trouble getting rides\")  Assesssment of Functional Status: At functional baseline    Mental Health Status:  Mental Health Status: Past Concern  Mental Health Management: Medication    Chemical Dependency Status:  Chemical Dependency Status: Current Concern             Values/Beliefs:  Spiritual, Cultural Beliefs, Mandaen Practices, Values that affect care:                 Additional Information:  Julita lives in a house with her parents and 2 siblings.    She is independent with ADLs at baseline. She does not drive. She states, \"it is not hard for me to get rides from family or friends\".    She \"was supposed to start a part-time  job today 2/13/22\".    She may need CD resources before discharge.    Family to transport at discharge.    Cherelle Quiros RN      "

## 2022-02-13 NOTE — ED TRIAGE NOTES
Patient comes to ED for evaluation of hematemesis. Vomit with blood started one hour ago but had been having episodes of emesis with no blood since awoke this am. Stated drank alcohol yesterday but none today.

## 2022-02-14 LAB
ALBUMIN SERPL-MCNC: 3.6 G/DL (ref 3.5–5)
ALP SERPL-CCNC: 76 U/L (ref 45–120)
ALT SERPL W P-5'-P-CCNC: 22 U/L (ref 0–45)
ANION GAP SERPL CALCULATED.3IONS-SCNC: 12 MMOL/L (ref 5–18)
AST SERPL W P-5'-P-CCNC: 29 U/L (ref 0–40)
BILIRUB SERPL-MCNC: 0.7 MG/DL (ref 0–1)
BUN SERPL-MCNC: 7 MG/DL (ref 8–22)
CALCIUM SERPL-MCNC: 8.2 MG/DL (ref 8.5–10.5)
CHLORIDE BLD-SCNC: 107 MMOL/L (ref 98–107)
CO2 SERPL-SCNC: 16 MMOL/L (ref 22–31)
CREAT SERPL-MCNC: 0.8 MG/DL (ref 0.6–1.1)
ERYTHROCYTE [DISTWIDTH] IN BLOOD BY AUTOMATED COUNT: 12.7 % (ref 10–15)
GFR SERPL CREATININE-BSD FRML MDRD: >90 ML/MIN/1.73M2
GLUCOSE BLD-MCNC: 72 MG/DL (ref 70–125)
HCT VFR BLD AUTO: 37.1 % (ref 35–47)
HGB BLD-MCNC: 12 G/DL (ref 11.7–15.7)
HGB BLD-MCNC: 12.2 G/DL (ref 11.7–15.7)
LACTATE SERPL-SCNC: 0.8 MMOL/L (ref 0.7–2)
MCH RBC QN AUTO: 32.3 PG (ref 26.5–33)
MCHC RBC AUTO-ENTMCNC: 32.3 G/DL (ref 31.5–36.5)
MCV RBC AUTO: 100 FL (ref 78–100)
PLATELET # BLD AUTO: 280 10E3/UL (ref 150–450)
POTASSIUM BLD-SCNC: 4 MMOL/L (ref 3.5–5)
PROT SERPL-MCNC: 6.3 G/DL (ref 6–8)
RBC # BLD AUTO: 3.72 10E6/UL (ref 3.8–5.2)
SODIUM SERPL-SCNC: 135 MMOL/L (ref 136–145)
WBC # BLD AUTO: 14.3 10E3/UL (ref 4–11)

## 2022-02-14 PROCEDURE — 85018 HEMOGLOBIN: CPT | Performed by: INTERNAL MEDICINE

## 2022-02-14 PROCEDURE — 99233 SBSQ HOSP IP/OBS HIGH 50: CPT | Performed by: INTERNAL MEDICINE

## 2022-02-14 PROCEDURE — C9113 INJ PANTOPRAZOLE SODIUM, VIA: HCPCS | Performed by: INTERNAL MEDICINE

## 2022-02-14 PROCEDURE — 120N000001 HC R&B MED SURG/OB

## 2022-02-14 PROCEDURE — 36415 COLL VENOUS BLD VENIPUNCTURE: CPT | Performed by: INTERNAL MEDICINE

## 2022-02-14 PROCEDURE — 258N000003 HC RX IP 258 OP 636: Performed by: INTERNAL MEDICINE

## 2022-02-14 PROCEDURE — 250N000011 HC RX IP 250 OP 636: Performed by: INTERNAL MEDICINE

## 2022-02-14 PROCEDURE — 83605 ASSAY OF LACTIC ACID: CPT | Performed by: INTERNAL MEDICINE

## 2022-02-14 PROCEDURE — 80053 COMPREHEN METABOLIC PANEL: CPT | Performed by: INTERNAL MEDICINE

## 2022-02-14 PROCEDURE — 250N000013 HC RX MED GY IP 250 OP 250 PS 637: Performed by: INTERNAL MEDICINE

## 2022-02-14 RX ORDER — MULTIPLE VITAMINS W/ MINERALS TAB 9MG-400MCG
1 TAB ORAL DAILY
Status: DISCONTINUED | OUTPATIENT
Start: 2022-02-14 | End: 2022-02-15 | Stop reason: HOSPADM

## 2022-02-14 RX ADMIN — Medication 1 TABLET: at 09:15

## 2022-02-14 RX ADMIN — PANTOPRAZOLE SODIUM 40 MG: 40 INJECTION, POWDER, FOR SOLUTION INTRAVENOUS at 07:48

## 2022-02-14 RX ADMIN — CEFTRIAXONE SODIUM 1 G: 1 INJECTION, POWDER, FOR SOLUTION INTRAMUSCULAR; INTRAVENOUS at 16:18

## 2022-02-14 RX ADMIN — ENOXAPARIN SODIUM 30 MG: 30 INJECTION SUBCUTANEOUS at 16:28

## 2022-02-14 RX ADMIN — ACETAMINOPHEN 650 MG: 325 TABLET ORAL at 21:25

## 2022-02-14 RX ADMIN — PANTOPRAZOLE SODIUM 40 MG: 40 INJECTION, POWDER, FOR SOLUTION INTRAVENOUS at 20:34

## 2022-02-14 RX ADMIN — SODIUM CHLORIDE: 9 INJECTION, SOLUTION INTRAVENOUS at 21:25

## 2022-02-14 ASSESSMENT — ACTIVITIES OF DAILY LIVING (ADL)
ADLS_ACUITY_SCORE: 12
DOING_ERRANDS_INDEPENDENTLY_DIFFICULTY: NO
ADLS_ACUITY_SCORE: 12
HEARING_DIFFICULTY_OR_DEAF: NO
ADLS_ACUITY_SCORE: 4
ADLS_ACUITY_SCORE: 12
TOILETING_ISSUES: NO
ADLS_ACUITY_SCORE: 6
WALKING_OR_CLIMBING_STAIRS_DIFFICULTY: NO
DRESSING/BATHING_DIFFICULTY: NO
ADLS_ACUITY_SCORE: 6
ADLS_ACUITY_SCORE: 4
ADLS_ACUITY_SCORE: 12
DIFFICULTY_COMMUNICATING: NO
ADLS_ACUITY_SCORE: 12
ADLS_ACUITY_SCORE: 6
ADLS_ACUITY_SCORE: 6
ADLS_ACUITY_SCORE: 12
FALL_HISTORY_WITHIN_LAST_SIX_MONTHS: NO
ADLS_ACUITY_SCORE: 12
ADLS_ACUITY_SCORE: 12
CONCENTRATING,_REMEMBERING_OR_MAKING_DECISIONS_DIFFICULTY: NO
ADLS_ACUITY_SCORE: 6
PATIENT_/_FAMILY_COMMUNICATION_STYLE: SPOKEN LANGUAGE (ENGLISH OR BILINGUAL)
ADLS_ACUITY_SCORE: 4
DIFFICULTY_EATING/SWALLOWING: NO
WEAR_GLASSES_OR_BLIND: OTHER (SEE COMMENTS)
ADLS_ACUITY_SCORE: 6

## 2022-02-14 ASSESSMENT — MIFFLIN-ST. JEOR: SCORE: 1222.67

## 2022-02-14 NOTE — UTILIZATION REVIEW
Admission Status; Secondary Review Determination       Under the authority of the Utilization Management Committee, the utilization review process indicated a secondary review on the above patient. The review outcome is based on review of the medical records, discussions with staff, and applying clinical experience noted on the date of the review.     (x) Inpatient Status Appropriate - This patient's medical care is consistent with medical management for inpatient care and reasonable inpatient medical practice.     RATIONALE FOR DETERMINATION     Ms. Fernandez is a 22 yo female with a PMH of alcohol abuse, GERD and depression who presents to the ED with hematemesis.  She was severely dehydrated and have significantly elevated lactic acid.  She has a leukocytosis that is improving with IV abx although infectious source has not yet been identified.  She received multiple L of IVF; continues on IVF today.  Was started on protonix gtt and kept NPO; no further hematemesis this am and so is being trialed on a liquid diet.    She is requiring ongoing hospital treatment and monitoring today.      At the time of admission with the information available to the attending physician more than 2 nights Hospital complex care was anticipated, based on patient risk of adverse outcome if treated as outpatient and complex care required. Inpatient admission is appropriate based on the Medicare guidelines.      The information on this document is developed by the utilization review team in order for the business office to ensure compliance. This only denotes the appropriateness of proper admission status and does not reflect the quality of care rendered.   The definitions of Inpatient Status and Observation Status used in making the determination above are those provided in the CMS Coverage Manual, Chapter 1 and Chapter 6, section 70.4.         Sincerely,     Rosy Mann, DO  Utilization Review  Physician Advisor  [facility].

## 2022-02-14 NOTE — PLAN OF CARE
Problem: Adult Inpatient Plan of Care  Goal: Plan of Care Review  Outcome: Improving   Pt. Pleasant, cooperative, no anxiety expressed. Ciwa score of 1. Will continue to monitor.

## 2022-02-14 NOTE — ED NOTES
Alyce on P1 called to alert that ED handoff note is in, she states she will let floor RNCarolina know.

## 2022-02-14 NOTE — ED NOTES
Patient ambulated to bathroom. She had an uneventful night on her computer and napping. No issue or requests. A&O, calm, VVS. Finished her banana bag.

## 2022-02-14 NOTE — PLAN OF CARE
Notified by patient nurse, Carolina Ortega, that pt does not want anyone to know she is here or any information to be given out. Admitting called and pt made Code White.

## 2022-02-14 NOTE — ED NOTES
"Patient denies pain, denies anxiety, ordered vegetable broth with strawberry kiwi water. Patient resting in room at this time. /68   Pulse 89   Temp 98.1  F (36.7  C) (Oral)   Resp 22   Ht 1.626 m (5' 4\")   Wt 45.4 kg (100 lb)   SpO2 99%   BMI 17.16 kg/m      "

## 2022-02-14 NOTE — ED NOTES
"[facility] ED Handoff Report    ED Chief Complaint: hematemesis    ED Diagnosis:  (E87.2) Alcoholic ketoacidosis  (primary encounter diagnosis)  Comment:   Plan:     (R00.0) Sinus tachycardia  Comment:   Plan:     (E87.2) Lactic acidosis  Comment:   Plan:     (K92.2) UGIB (upper gastrointestinal bleed)  Comment:   Plan:     (E87.2) High anion gap metabolic acidosis  Comment:   Plan:     (E16.2) Hypoglycemia  Comment:   Plan:        PMH:    Past Medical History:   Diagnosis Date     Anxiety      Gastroesophageal reflux disease         Code Status:  Full Code     Falls Risk: No Band: Not applicable    Current Living Situation/Residence: lives in a house and with parents     Elimination Status: Continent: Yes     Activity Level: Independent    Patients Preferred Language:  English     Needed: No    Vital Signs:  /68   Pulse 94   Temp 98.1  F (36.7  C) (Oral)   Resp 18   Ht 1.626 m (5' 4\")   Wt 45.4 kg (100 lb)   SpO2 100%   BMI 17.16 kg/m       Cardiac Rhythm: NSR    Pain Score: 0/10    Is the Patient Confused:  No    Last Food or Drink: 02/14/22 at 1000    Focused Assessment:  Patient came in for hematemesis - coffee ground emesis. CT showed fatty liver. CXR negative. Lives at home with parents. + for meth and marijuana. CIWA - 0. Patient is alert and oriented and able to make needs known. Tolerating full liquid diet well. Denies pain, denies anxiety. Indenedant with ambulation and self cares. NS running at 75 ml/hr.     Tests Performed: Done: Labs and Imaging    Treatments Provided:  Fluids and IV electrolytes    Family Dynamics/Concerns: No    Family Updated On Visitor Policy: No    Plan of Care Communicated to Family: No    Who Was Updated about Plan of Care: patient states she doesn't want anyone to be updated on care plan, including family.     Belongings Checklist Done and Signed by Patient: Yes    Medications sent with patient: NA      Covid: asymptomatic , " negative    Additional Information:     RN: Brooklynn Alvarez   2/14/2022 2:01 PM

## 2022-02-14 NOTE — PROGRESS NOTES
[facility]    PROGRESS NOTE - Hospitalist Service    Assessment and Plan    21 year old female with past medical history of  alcohol abuse, GERD and depression who presents to the ER for the evaluation of hematemesis.  She was admitted with     Severe lactic acidosis  - Probably combination of severe dehydration, hematemesis and alcohol dependence  - Patient presented with lactic acid 9.7 and looks extremely dry  - Received 2 L vigorous IV hydration in ER, lactic acid is improving to 3.9  - Improving IV fluid support, decrease IV fluid rate  - Follow-up on blood culture  - Unremarkable chest x-ray  - Urinalysis shows 150 of ketones  - Lactic acid is not normal today  - Continue IV Rocephin empirically for 1 more day, plan to discontinue tomorrow if culture remains     Nausea and vomiting with hematemesis  - Probably Irma-Ro tear secondary to vomiting versus GERD  - Stable hemoglobin on admission but patient is very dry  - Stable hemoglobin every 6 hours  - Started on Protonix drip in ER, continue  - CT abdomen show esophagitis  - Change Protonix drip to twice daily  - Consider GI consult if hemoglobin drops  -Started on clear liquid diet last night and patient tolerated  - Advance to full liquid diet today     Alcohol dependence  - Patient presented with alcohol level of 31  - Start on CIWA protocol  - Continue IV fluid support  - Social service consult for chemical dependence  - Positive urine drug screen for amphetamine and cannabinoids, patient admits to using marijuana and some of her boyfriends Adderall      Abdominal pain  - Probably secondary to intractable nausea and vomiting  - Patient complains of back pain  - Normal lipase  - Check CT abdomen     Leukocytosis  - Probably reactive secondary to above  - Unremarkable chest x-ray and urinalysis  - CT abdomen shows esophagitis  - Continue IV Rocephin empirically till negative blood culture for 2 days  - Follow-up on blood  culture     Tachycardia  - Probably reactive to the above  - EKG shows sinus tachycardia  - Unremarkable TSH   - Improving  - Continue to monitor on telemetry     History of depression  - Patient currently not take medications     Insomnia  - Resume trazodone as needed.    Offered contact patient's parents but patient declined sharing medical information with family and she will update them herself  Nursing staff is aware       Active Problems:    Sinus tachycardia    Lactic acidosis    Alcoholic ketoacidosis    Hypoglycemia    High anion gap metabolic acidosis    UGIB (upper gastrointestinal bleed)        VTE prophylaxis:  Enoxaparin (Lovenox) SQ  DIET: Orders Placed This Encounter      Full Liquid Diet      Disposition/Barriers to discharge: IV fluid, advancing diet, IV antibiotics and pending culture  Code Status: Full Code    Subjective:  Julita is feeling much better today, improving abdominal pain, no vomiting overnight, tolerating clear liquid diet    PHYSICAL EXAM  Vitals:    02/13/22 1335   Weight: 45.4 kg (100 lb)     B/P:121/68 T:98.1 P:91 R:20     Intake/Output Summary (Last 24 hours) at 2/14/2022 1303  Last data filed at 2/14/2022 0832  Gross per 24 hour   Intake 2080 ml   Output --   Net 2080 ml      Body mass index is 17.16 kg/m .    Constitutional: awake, alert, cooperative, no apparent distress, and appears stated age  Eyes: Lids and lashes normal, pupils equal, round and reactive to light, extra ocular muscles intact, sclera clear, conjunctiva normal  ENT: Normocephalic, without obvious abnormality, atraumatic, sinuses nontender on palpation, external ears without lesions, oral pharynx with moist mucous membranes, tonsils without erythema or exudates, gums normal and good dentition.  Respiratory: No increased work of breathing, good air exchange, clear to auscultation bilaterally, no crackles or wheezing  Cardiovascular: Normal apical impulse, regular rate and rhythm, normal S1 and S2, no S3 or S4,  and no murmur noted  GI: No scars, normal bowel sounds, soft, non-distended, non-tender, no masses palpated, no hepatosplenomegally  Skin: no bruising or bleeding and normal skin color, texture, turgor  Musculoskeletal: There is no redness, warmth, or swelling of the joints.  Full range of motion noted.  no lower extremity pitting edema present  Neurologic: Awake, alert, oriented to name, place and time.  Cranial nerves II-XII are grossly intact.  Motor is 5 out of 5 bilaterally.   Sensory is intact.    Neuropsychiatric: Appropriate with examiner      PERTINENT LABS/IMAGING:  Recent Labs   Lab 02/14/22  0732 02/14/22  0005 02/13/22  1822 02/13/22  1617 02/13/22  1424   WBC 14.3*  --   --   --  20.8*   HGB 12.0 12.2 12.3  --  14.3     --   --   --  104*     --   --   --  362   INR  --   --   --   --  1.07   *  --   --   --  143   POTASSIUM 4.0  --   --   --  4.3   CHLORIDE 107  --   --   --  102   CO2 16*  --   --   --  10*   BUN 7*  --   --   --  13   CR 0.80  --   --   --  0.94   ANIONGAP 12  --   --   --  31*   SHREE 8.2*  --   --   --  9.7   GLC 72  --   --  153* 51*   ALBUMIN 3.6  --   --   --  5.3*   PROTTOTAL 6.3  --   --   --  8.6*   BILITOTAL 0.7  --   --   --  0.4   ALKPHOS 76  --   --   --  115   ALT 22  --   --   --  39   AST 29  --   --   --  60*   LIPASE  --   --   --   --  28     Recent Results (from the past 24 hour(s))   XR Chest Port 1 View    Narrative    EXAM: XR CHEST PORT 1 VIEW  LOCATION: [facility]  DATE/TIME: 2/13/2022 5:47 PM    INDICATION: Hematemesis, lactic acidosis  COMPARISON: None.      Impression    IMPRESSION: Negative chest.   CT Abdomen Pelvis w Contrast    Narrative    EXAM: CT ABDOMEN PELVIS W CONTRAST  LOCATION: [facility]  DATE/TIME: 2/13/2022 6:44 PM    INDICATION: Nausea/vomiting.  COMPARISON: None.  TECHNIQUE: CT scan of the abdomen and pelvis was performed following injection of IV contrast. Multiplanar  reformats were obtained. Dose reduction techniques were used.  CONTRAST: Isovue 370 100 mL.    FINDINGS:   LOWER CHEST: Attenuation wall thickening in the visualized lower esophagus most likely due to reflux esophagitis.    HEPATOBILIARY: Fatty liver.    PANCREAS: Normal.    SPLEEN: Normal.    ADRENAL GLANDS: Normal.    KIDNEYS/BLADDER: Normal.    BOWEL: Normal appendix.    LYMPH NODES: Normal.    VASCULATURE: Unremarkable.    PELVIC ORGANS: IUD in the central uterus.    MUSCULOSKELETAL: Normal.      Impression    IMPRESSION:   1.  Low-attenuation wall thickening in the visualized lower esophagus likely due to reflux esophagitis.    2.  Diffuse fatty infiltration of the liver.    3.  Normal appendix.    4.  IUD in the central uterus.       Discussed with patient, nursing staff and discharge planner    Osorio Ramirez MD  [facility]  [phone]

## 2022-02-15 VITALS
HEIGHT: 64 IN | RESPIRATION RATE: 18 BRPM | HEART RATE: 81 BPM | OXYGEN SATURATION: 98 % | TEMPERATURE: 97.6 F | BODY MASS INDEX: 17.42 KG/M2 | DIASTOLIC BLOOD PRESSURE: 92 MMHG | WEIGHT: 102 LBS | SYSTOLIC BLOOD PRESSURE: 131 MMHG

## 2022-02-15 LAB
ALBUMIN SERPL-MCNC: 3.4 G/DL (ref 3.5–5)
ALP SERPL-CCNC: 61 U/L (ref 45–120)
ALT SERPL W P-5'-P-CCNC: 19 U/L (ref 0–45)
ANION GAP SERPL CALCULATED.3IONS-SCNC: 10 MMOL/L (ref 5–18)
AST SERPL W P-5'-P-CCNC: 24 U/L (ref 0–40)
ATRIAL RATE - MUSE: 121 BPM
BILIRUB SERPL-MCNC: 0.4 MG/DL (ref 0–1)
BUN SERPL-MCNC: 4 MG/DL (ref 8–22)
CALCIUM SERPL-MCNC: 8 MG/DL (ref 8.5–10.5)
CHLORIDE BLD-SCNC: 109 MMOL/L (ref 98–107)
CO2 SERPL-SCNC: 21 MMOL/L (ref 22–31)
CREAT SERPL-MCNC: 0.66 MG/DL (ref 0.6–1.1)
DIASTOLIC BLOOD PRESSURE - MUSE: NORMAL MMHG
ERYTHROCYTE [DISTWIDTH] IN BLOOD BY AUTOMATED COUNT: 12.6 % (ref 10–15)
GFR SERPL CREATININE-BSD FRML MDRD: >90 ML/MIN/1.73M2
GLUCOSE BLD-MCNC: 83 MG/DL (ref 70–125)
HCT VFR BLD AUTO: 33.6 % (ref 35–47)
HGB BLD-MCNC: 11 G/DL (ref 11.7–15.7)
INTERPRETATION ECG - MUSE: NORMAL
MCH RBC QN AUTO: 32.8 PG (ref 26.5–33)
MCHC RBC AUTO-ENTMCNC: 32.7 G/DL (ref 31.5–36.5)
MCV RBC AUTO: 100 FL (ref 78–100)
P AXIS - MUSE: 82 DEGREES
PLATELET # BLD AUTO: 228 10E3/UL (ref 150–450)
POTASSIUM BLD-SCNC: 3.6 MMOL/L (ref 3.5–5)
PR INTERVAL - MUSE: 122 MS
PROT SERPL-MCNC: 5.4 G/DL (ref 6–8)
QRS DURATION - MUSE: 82 MS
QT - MUSE: 336 MS
QTC - MUSE: 477 MS
R AXIS - MUSE: 88 DEGREES
RBC # BLD AUTO: 3.35 10E6/UL (ref 3.8–5.2)
SODIUM SERPL-SCNC: 140 MMOL/L (ref 136–145)
SYSTOLIC BLOOD PRESSURE - MUSE: NORMAL MMHG
T AXIS - MUSE: 56 DEGREES
VENTRICULAR RATE- MUSE: 121 BPM
WBC # BLD AUTO: 6.5 10E3/UL (ref 4–11)

## 2022-02-15 PROCEDURE — 36415 COLL VENOUS BLD VENIPUNCTURE: CPT | Performed by: INTERNAL MEDICINE

## 2022-02-15 PROCEDURE — 85027 COMPLETE CBC AUTOMATED: CPT | Performed by: INTERNAL MEDICINE

## 2022-02-15 PROCEDURE — 80053 COMPREHEN METABOLIC PANEL: CPT | Performed by: INTERNAL MEDICINE

## 2022-02-15 PROCEDURE — C9113 INJ PANTOPRAZOLE SODIUM, VIA: HCPCS | Performed by: INTERNAL MEDICINE

## 2022-02-15 PROCEDURE — 250N000013 HC RX MED GY IP 250 OP 250 PS 637: Performed by: INTERNAL MEDICINE

## 2022-02-15 PROCEDURE — 99239 HOSP IP/OBS DSCHRG MGMT >30: CPT | Performed by: INTERNAL MEDICINE

## 2022-02-15 PROCEDURE — 250N000011 HC RX IP 250 OP 636: Performed by: INTERNAL MEDICINE

## 2022-02-15 PROCEDURE — 99221 1ST HOSP IP/OBS SF/LOW 40: CPT | Mod: 95 | Performed by: FAMILY MEDICINE

## 2022-02-15 RX ORDER — PANTOPRAZOLE SODIUM 40 MG/1
40 TABLET, DELAYED RELEASE ORAL
Status: DISCONTINUED | OUTPATIENT
Start: 2022-02-16 | End: 2022-02-15 | Stop reason: HOSPADM

## 2022-02-15 RX ORDER — PANTOPRAZOLE SODIUM 40 MG/1
40 TABLET, DELAYED RELEASE ORAL
Qty: 30 TABLET | Refills: 0 | Status: SHIPPED | OUTPATIENT
Start: 2022-02-16 | End: 2022-03-14

## 2022-02-15 RX ADMIN — Medication 1 TABLET: at 07:44

## 2022-02-15 RX ADMIN — ONDANSETRON 4 MG: 2 INJECTION INTRAMUSCULAR; INTRAVENOUS at 08:26

## 2022-02-15 RX ADMIN — PANTOPRAZOLE SODIUM 40 MG: 40 INJECTION, POWDER, FOR SOLUTION INTRAVENOUS at 07:45

## 2022-02-15 RX ADMIN — ACETAMINOPHEN 650 MG: 325 TABLET ORAL at 07:44

## 2022-02-15 ASSESSMENT — ACTIVITIES OF DAILY LIVING (ADL)
ADLS_ACUITY_SCORE: 6

## 2022-02-15 NOTE — PLAN OF CARE
[id number] Shift Summary     A&Ox4. Slept well overnight. VSS. Denies pain. Denies nausea/emesis. Scoring a 1 on CIWA for sweating. Tele NSR. NS infusing at 75 ml/hr.    Jacqueline Bettencourt RN    Problem: Adult Inpatient Plan of Care  Goal: Plan of Care Review  Outcome: No Change     Problem: Mood Impairment (Anxiety Signs/Symptoms)  Goal: Improved Mood Symptoms (Anxiety Signs/Symptoms)  Outcome: Improving

## 2022-02-15 NOTE — PLAN OF CARE
Pt tolerated dinner, fruit and 2 sandwiches, without pain or nausea.  Pt will be discharging shortly.  IV Dc'd, paperwork reviewed including medication changes.  Rx at [facility].  Pt said she would walk to the door.  Carolyn Victoria RN

## 2022-02-15 NOTE — DISCHARGE INSTRUCTIONS
[facility]    [address]  [facility] [address]  [phone]    For information on 12 step meetings:  [email or web address]
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
## 2022-02-15 NOTE — DISCHARGE SUMMARY
[facility] MEDICINE  DISCHARGE SUMMARY     Primary Care Physician: Brooklynn Lazcano  Admission Date: 2/13/2022   Discharge Provider: Judd Schmidt MD Discharge Date: 2/15/2022   Diet: Regular diet   Code Status: Full Code   Activity: As tolerated        Condition at Discharge: Stable     REASON FOR PRESENTATION(See Admission Note for Details)   Vomiting/alcohol use    PRINCIPAL & ACTIVE DISCHARGE DIAGNOSES     Active Problems:    Sinus tachycardia    Lactic acidosis    Alcoholic ketoacidosis    Hypoglycemia    High anion gap metabolic acidosis    UGIB (upper gastrointestinal bleed)      SIGNIFICANT FINDINGS (Imaging, labs):     Results for orders placed or performed during the hospital encounter of 02/13/22   CT Abdomen Pelvis w Contrast    Narrative    EXAM: CT ABDOMEN PELVIS W CONTRAST  LOCATION: [facility]  DATE/TIME: 2/13/2022 6:44 PM    INDICATION: Nausea/vomiting.  COMPARISON: None.  TECHNIQUE: CT scan of the abdomen and pelvis was performed following injection of IV contrast. Multiplanar reformats were obtained. Dose reduction techniques were used.  CONTRAST: Isovue 370 100 mL.    FINDINGS:   LOWER CHEST: Attenuation wall thickening in the visualized lower esophagus most likely due to reflux esophagitis.    HEPATOBILIARY: Fatty liver.    PANCREAS: Normal.    SPLEEN: Normal.    ADRENAL GLANDS: Normal.    KIDNEYS/BLADDER: Normal.    BOWEL: Normal appendix.    LYMPH NODES: Normal.    VASCULATURE: Unremarkable.    PELVIC ORGANS: IUD in the central uterus.    MUSCULOSKELETAL: Normal.      Impression    IMPRESSION:   1.  Low-attenuation wall thickening in the visualized lower esophagus likely due to reflux esophagitis.    2.  Diffuse fatty infiltration of the liver.    3.  Normal appendix.    4.  IUD in the central uterus.   XR Chest Port 1 View    Narrative    EXAM: XR CHEST PORT 1 VIEW  LOCATION: [facility]  DATE/TIME:  2/13/2022 5:47 PM    INDICATION: Hematemesis, lactic acidosis  COMPARISON: None.      Impression    IMPRESSION: Negative chest.       PENDING LABS     Pending Labs     Order Current Status    Blood Culture Peripheral Blood Preliminary result    Blood Culture Peripheral Blood Preliminary result         PROCEDURES ( this hospitalization only)          RECOMMENDATIONS TO OUTPATIENT PROVIDER FOR F/U VISIT     PCP 1 to 2 weeks  GI clinic 1 to 2 weeks    DISPOSITION     Home    SUMMARY OF HOSPITAL COURSE:      [facility]     PROGRESS NOTE - Hospitalist Service     Assessment and Plan     21 year old female with past medical history of  alcohol abuse, GERD and depression who presents to the ER for the evaluation of hematemesis.  She was admitted with     Severe lactic acidosis  -combination of severe dehydration, and alcohol abuse   Resolved with IV hydration aggressively  - Follow-up on blood culture no growth to date  - Unremarkable chest x-ray  - Urinalysis no signs for infection  Stop antibiotics as cultures are negative     Nausea and vomiting with hematemesis  -Likely from Irma-Ro   Stable hemoglobin, no melena  - CT abdomen show esophagitis/fatty liver  Discharged on daily PPI  Follow-up in GI clinic within 1 to 2 weeks     Alcohol dependence  - Patient presented with alcohol level of 31  Not currently withdrawal  - Positive urine drug screen for amphetamine and cannabinoids,  addiction medicine consulted    Leukocytosis  -Resolved, reactive to above  - Unremarkable chest x-ray and urinalysis  - CT abdomen shows esophagitis  No signs of infection prior to discharge     History of depression  - Patient currently not take medications     Insomnia  - Resume trazodone as needed.     Offered contact patient's parents but patient declined sharing medical information with family and she will update them herself  Nursing staff is aware.  Patient examined with nursing  chaperone       Underweight - rd consult         Discharge Medications with Med changes:        Review of your medicines      START taking      Dose / Directions   pantoprazole 40 MG EC tablet  Commonly known as: PROTONIX      Dose: 40 mg  Start taking on: February 16, 2022  Take 1 tablet (40 mg) by mouth every morning (before breakfast)  Quantity: 30 tablet  Refills: 0        CONTINUE these medicines which have NOT CHANGED      Dose / Directions   levonorgestrel 20 MCG/24HR IUD  Commonly known as: MIRENA  Used for: Encounter for IUD insertion      [LEVONORGESTREL (MIRENA) 20 MCG/24 HOURS (5 YRS) 52 MG IUD] by Intrauterine route once for 1 dose.  Quantity: 1 each  Refills: 0     traZODone 50 MG tablet  Commonly known as: DESYREL      Dose: 50 mg  Take 50 mg by mouth nightly as needed for sleep  Refills: 0        STOP taking    ibuprofen 200 MG tablet  Commonly known as: ADVIL/MOTRIN        omeprazole 20 MG DR capsule  Commonly known as: priLOSEC              Where to get your medicines      These medications were sent to [facility]/pharmacy #[id number] [address] [address]    Phone: [phone]   pantoprazole 40 MG EC tablet             Rationale for medication changes:      Above        Consults         Immunizations given this encounter     Most Recent Immunizations   Administered Date(s) Administered     COVID-19,PF,Pfizer (12+ Yrs) 04/29/2021     Comvax (HIB/HepB) 02/19/2004     DTAP (<7y) 11/14/2007     FLU 6-35 months 09/16/2009     HPV Quadrivalent 12/18/2014     HPV9 03/15/2016     HepA-ped 2 Dose 06/17/2010     Influenza (IIV3) PF 09/21/2014     Influenza Vaccine IM > 6 months Valent IIV4 (Alfuria,Fluzone) 12/09/2021     Influenza Vaccine, 6+MO IM (QUADRIVALENT W/PRESERVATIVES) 10/01/2019     MMR 11/14/2007     Meningococcal (Menveo ) 12/14/2018     Pneumo Conj 13-V (2010&after) 05/16/2003     Pneumococcal (PCV 7) 11/19/2003     Poliovirus, inactivated  "(IPV) 11/14/2007     Tdap (Adacel,Boostrix) 07/30/2013     Varicella 11/14/2007           Anticoagulation Information      Recent INR results:   Recent Labs   Lab 02/13/22  1424   INR 1.07     Warfarin doses (if applicable) or name of other anticoagulant:       Discharge Orders     Discharge Procedure Orders   Adult Gastro Ref - Procedure Only   Standing Status: Future   Referral Priority: Routine: Next available opening Referral Type: Consultation   Requested Specialty: Gastroenterology   Number of Visits Requested: 1     Reason for your hospital stay   Order Comments: Abdo pain, etoh use     Follow-up and recommended labs and tests    Order Comments: Follow up with primary care provider, SHIRA NEWTON, within 7 days for hospital follow- up.  The following labs/tests are recommended: cbc/bmp.    Follow up with GI clinic  , at (location with clinic name or city) , within 1-2 wks   for hospital follow- up. No follow up labs or test are needed.     Activity   Order Comments: Your activity upon discharge: activity as tolerated     Order Specific Question Answer Comments   Is discharge order? Yes      Diet   Order Comments: Follow this diet upon discharge: Orders Placed This Encounter      Regular Diet Adult     Order Specific Question Answer Comments   Is discharge order? Yes      Examination     Vital Signs in last 24 hours:   Temp:  [97.8  F (36.6  C)-98.4  F (36.9  C)] 98.3  F (36.8  C)  Pulse:  [] 73  Resp:  [16-18] 18  BP: (108-145)/(56-92) 145/87  SpO2:  [96 %-99 %] 99 %   BP (!) 145/87 (BP Location: Right arm)   Pulse 73   Temp 98.3  F (36.8  C) (Oral)   Resp 18   Ht 1.626 m (5' 4\")   Wt 46.3 kg (102 lb)   SpO2 99%   BMI 17.51 kg/m    General appearance: alert, appears stated age and cooperative  Lungs: clear to auscultation bilaterally  Heart: s1/2  Abdomen: soft, non-tender; bowel sounds normal; no masses,  no organomegaly  Extremities: no edema        Please see EMR for more detailed " significant labs, imaging, consultant notes etc.  Total time spent on discharge: 35 minutes    Judd Schmidt MD   [facility] Service: Ph:[phone]    CC:Brooklynn Lazcano

## 2022-02-15 NOTE — CONSULTS
"SW consult for CD resources, completed. SW introduced self and role. Pt stated she has a better understanding how her brain works and doesn't anticipate drinking anymore. She is considering activities she can participate in for a healthier lifestyle. Stated her boyfriend is a very strong support, as well as her therapist. SW asked if pt had any questions or would like resources to have on hand, pt stated \"I know who to call if I need help.\"     BARB Menendez  2/15/2022  1:32 PM    "

## 2022-02-15 NOTE — PLAN OF CARE
Problem: Adult Inpatient Plan of Care  Goal: Optimal Comfort and Wellbeing  Outcome: Improving    Pt given Tylenol for c/o some back discomfort, with good relief. Tolerating clears and some saltine crackers, denies nausea. CIWA 0.  VSS, will continue to monitor.

## 2022-02-15 NOTE — CONSULTS
Addiction Medicine Consultation    Julita Fernandez, 2000, [id number]    Primary:  Brooklynn Lazcano, [phone]       Tele-Visit Details    Type of service:  Video Visit  Video Start Time (time video started): 1121  Video End Time (time video stopped): 1142  Originating Location (pt. Location): [facility], patient's room  Distant Location (provider location): On site at [facility]  Reason for Televisit: COVID-19  Mode of Communication:  Video Conference via polycom  Physician has received verbal consent for a video visit from the patient? Yes    Assessment and Plan:     Active Problems:    Sinus tachycardia    Lactic acidosis    Alcoholic ketoacidosis    Hypoglycemia    High anion gap metabolic acidosis    UGIB (upper gastrointestinal bleed)    21-year-old female with a history of GERD who presented with hematemesis following a day of excessive drinking.  Based on her reported history, patient meets criteria for a mild alcohol use disorder and moderate cannabis use disorder.  Patient also reports that she has illicitly used Adderall twice. Fortunately, she has good insight to her addictive tendencies and is highly motivated to not return to drinking.  Discussed with patient the recommendation that she completely abstain from alcohol and she agrees.  We discussed the option of going to a 12-step program if she finds that it is difficult to avoid substances including alcohol.  At this time, I am not recommending any pharmacotherapy however, I did discuss with her that if she returns to drinking after this hospitalization, it may indicate that there is a more serious problem requiring further intervention.  The addiction medicine outpatient clinic information and [facility] website information has been added to the discharge instructions as a resource if patient continues to have problems.      She has not had any serious alcohol withdrawal and from that standpoint,  "could be discharged later today.  For questions and concerns, please page the [facility] addiction medicine provider.    Chief Complaint: hematemesis     HPI:    Julita Fernandez is a 21 year old old female with a past medical history significant for GERD who presented to [facility] for nausea, vomiting, hematemesis following a day of excessive drinking (alcohol.) Consultation requested for \"drug abuse.\" Patient reports that she had used cannabis for a number of years and when she went to college in [place] in the fall, she stopped using cannabis and started drinking almost daily.    Substance of choice: Alcohol and cannabis    Opioids:  Denies use    ETOH:  First use: 18 years of age  Amount/frequency: On average 4 shots, 5 days/week  Max Daily amount: 1/3 liter of vodka  Last use: The day prior to admission  History of seizures: Denies  History of DTs: Denies  Use of alcohol pharmacotherapies: No    Patient reports that she had decreased her food intake this past week and then drink excessively the day prior to admission after having not had alcohol for several weeks.  She began having severe vomiting and hematemesis and therefore came to the emergency department.    Amphetamines:  Type and method of use: Adderall tablets  Twice in her lifetime  Amount/frequency: 1 tablet  Last use: The week prior to admission    Benzodiazepines:  Denies use    Cannabis:  Frequency: Using almost daily, smoking and edibles  Last use: Edibles the week prior to admission  Patient wants to stop using    Others (synthetic cannabinoids, hallucinogens):  Denies        Medical History  Past Medical History:   Diagnosis Date     Anxiety      Gastroesophageal reflux disease        Surgical History  History reviewed. No pertinent surgical history.    Social History  Current living situation: Moved back to [place] from [place] in [place] in December 2021.  Currently living with parents and 2 siblings.  Employment: Was supposed to be starting a " "job as a  this week    Social History     Tobacco Use     Smoking status: Never Smoker     Smokeless tobacco: Never Used       Family History  Reviewed    Paternal grandmother: Alcohol use disorder  Paternal uncle: Alcohol use disorder    Prior to Admission Medications   Medications Prior to Admission   Medication Sig Dispense Refill Last Dose     ibuprofen (ADVIL/MOTRIN) 200 MG tablet Take 200-400 mg by mouth every 6 hours as needed for mild pain   Past Month at Unknown time     omeprazole (PRILOSEC) 20 MG DR capsule Take 20 mg by mouth daily as needed   Past Week at Unknown time     traZODone (DESYREL) 50 MG tablet Take 50 mg by mouth nightly as needed for sleep   Past Week at Unknown time     levonorgestrel (MIRENA) 20 mcg/24 hours (5 yrs) 52 mg IUD [LEVONORGESTREL (MIRENA) 20 MCG/24 HOURS (5 YRS) 52 MG IUD] by Intrauterine route once for 1 dose. 1 each 0        Allergies  Allergies   Allergen Reactions     Soy [Isoflavones] Other (See Comments)     Facial eczema           Review of Systems:    10 point review of systems is negative apart from that which is mentioned in the HPI.      Physical Exam:    BP (!) 130/92 (BP Location: Right arm)   Pulse 84   Temp 97.8  F (36.6  C) (Oral)   Resp 17   Ht 1.626 m (5' 4\")   Wt 46.3 kg (102 lb)   SpO2 96%   BMI 17.51 kg/m      Standard physical exam not performed today since this encounter is via telehealth during the COVID-19 pandemic.  The exams performed by previous providers are personally reviewed in the chart.      General appearance   Gen: awake, alert, and oriented   Dermatologic   No rash. No piloerection or diaphoresis. No jaundice  HEENT   EOMI.    No yawning  Pulmonary   No respiratory distress. No cough noted.  Neurologic   Oriented to person, place, time and situation   No Tremor  Alertness: alert and oriented  Appearance: well groomed   Behavior/Demeanor: cooperative and pleasant, with good eye contact.  Speech: regular rate and " rhythm  Psychomotor: normal and overall unremarkable    Mood:  description consistent with euthymia  Affect: appropriate and was congruent to speech content  Thought Process/Associations: unremarkable   Thought Content: devoid of  suicidal ideation  Perception: devoid of hallucinations  Insight: excellent  Judgment: Poor with regards to substance use prior to admission  Attention/Concentration: normal  Language: intact and no problems  Fund of Knowledge: normal    Memory: intact      Cognitive functions grossly appear as described, but were not formally tested.      Results:    Lab Results personally reviewed.  Noted that urine drug screen was positive for amphetamines and cannabinoids on 02/13/2022.   personally reviewed and shows:    Prescriptions  Total Prescriptions: 2    Total Private Pay: 0    Fill Date ID   Written Drug Qty Days Prescriber Rx # Pharmacy Refill   Daily Dose* Pymt Type      12/07/2021  1   12/07/2021  Alprazolam 0.5 MG Tablet    15.00  7 [facility] [place]   [id number]   Gra ([id number])   0/0  2.14 LME  Comm Ins   [place]   06/24/2021  1   06/24/2021  Lorazepam 1 MG Tablet    3.00  2 [facility]   [id number]   [facility] ([id number])   0/0  1.50 [place]  [place]         Lay Cunningham MD  Addiction Medicine

## 2022-02-16 ENCOUNTER — PATIENT OUTREACH (OUTPATIENT)
Dept: CARE COORDINATION | Facility: CLINIC | Age: 22
End: 2022-02-16
Payer: COMMERCIAL

## 2022-02-16 DIAGNOSIS — Z71.89 OTHER SPECIFIED COUNSELING: ICD-10-CM

## 2022-02-16 NOTE — PROGRESS NOTES
Clinic Care Coordination Contact  [facility]: Post-Discharge Note  SITUATION                                                      Admission:    Admission Date: 02/13/22   Reason for Admission: Vomiting/alcohol use  Discharge:   Discharge Date: 02/15/22  Discharge Diagnosis: Vomiting/alcohol use    BACKGROUND                                                      21 year old female with past medical history of  alcohol abuse, GERD and depression who presents to the ER for the evaluation of hematemesis.  She was admitted with     Severe lactic acidosis  -combination of severe dehydration, and alcohol abuse   Resolved with IV hydration aggressively  - Follow-up on blood culture no growth to date  - Unremarkable chest x-ray  - Urinalysis no signs for infection  Stop antibiotics as cultures are negative     Nausea and vomiting with hematemesis  -Likely from Irma-Ro   Stable hemoglobin, no melena  - CT abdomen show esophagitis/fatty liver  Discharged on daily PPI  Follow-up in GI clinic within 1 to 2 weeks     Alcohol dependence  - Patient presented with alcohol level of 31  Not currently withdrawal  - Positive urine drug screen for amphetamine and cannabinoids,  addiction medicine consulted     Leukocytosis  -Resolved, reactive to above  - Unremarkable chest x-ray and urinalysis  - CT abdomen shows esophagitis  No signs of infection prior to discharge     History of depression  - Patient currently not take medications     Insomnia  - Resume trazodone as needed.     Offered contact patient's parents but patient declined sharing medical information with family and she will update them herself  Nursing staff is aware.  Patient examined with nursing chaperone     ASSESSMENT           Discharge Assessment  How are you doing now that you are home?: Patient shares that she is doing well. Patient declines need for resources or CC at this time. Thanks  for the check in.  How are your symptoms? (Red Flag symptoms  escalate to triage hotline per guidelines): Improved  Do you feel your condition is stable enough to be safe at home until your provider visit?: Yes  Does the patient have their discharge instructions? : Yes  Does the patient have questions regarding their discharge instructions? : No  Were you started on any new medications or were there changes to any of your previous medications? : Yes  Does the patient have all of their medications?: Yes  Do you have questions regarding any of your medications? : No  Do you have all of your needed medical supplies or equipment (DME)?  (i.e. oxygen tank, CPAP, cane, etc.): Yes  Discharge follow-up appointment scheduled within 14 calendar days? : No  Is patient agreeable to assistance with scheduling? : No (Patient will call to schedule herself, will get GI recommendation from PCP)    Post-op ([facility] CTA Only)  If the patient had a surgery or procedure, do they have any questions for a nurse?: No    Post-op (Clinicians Only)  Did the patient have surgery or a procedure: No  Fever: No  Chills: No        PLAN                                                      Outpatient Plan:  Follow up with primary care provider, SHIRA NEWTON, within 7 days for hospital follow- up.  The following labs/tests are recommended: cbc/bmp.    Follow up with GI clinic  , at (location with clinic name or city) , within 1-2 wks   for hospital follow- up. No follow up labs or test are needed.    No future appointments.      For any urgent concerns, please contact our 24 hour nurse triage line: [phone] ([phone])         NALINI Espino

## 2022-02-18 LAB
BACTERIA BLD CULT: NO GROWTH
BACTERIA BLD CULT: NO GROWTH

## 2022-03-12 ENCOUNTER — HOSPITAL ENCOUNTER (EMERGENCY)
Facility: HOSPITAL | Age: 22
Discharge: HOME | End: 2022-03-12
Admitting: PHYSICIAN ASSISTANT
Payer: COMMERCIAL

## 2022-03-12 VITALS
OXYGEN SATURATION: 99 % | WEIGHT: 103.62 LBS | RESPIRATION RATE: 20 BRPM | TEMPERATURE: 98.3 F | DIASTOLIC BLOOD PRESSURE: 71 MMHG | HEART RATE: 92 BPM | BODY MASS INDEX: 17.79 KG/M2 | SYSTOLIC BLOOD PRESSURE: 112 MMHG

## 2022-03-12 DIAGNOSIS — F10.10 ALCOHOL ABUSE: ICD-10-CM

## 2022-03-12 DIAGNOSIS — R11.2 NAUSEA WITH VOMITING: ICD-10-CM

## 2022-03-12 PROCEDURE — 258N000003 HC RX IP 258 OP 636: Performed by: PHYSICIAN ASSISTANT

## 2022-03-12 PROCEDURE — 250N000009 HC RX 250: Performed by: PHYSICIAN ASSISTANT

## 2022-03-12 PROCEDURE — 96361 HYDRATE IV INFUSION ADD-ON: CPT

## 2022-03-12 PROCEDURE — 250N000011 HC RX IP 250 OP 636: Performed by: PHYSICIAN ASSISTANT

## 2022-03-12 PROCEDURE — 99285 EMERGENCY DEPT VISIT HI MDM: CPT | Mod: 25

## 2022-03-12 PROCEDURE — 93005 ELECTROCARDIOGRAM TRACING: CPT | Performed by: PHYSICIAN ASSISTANT

## 2022-03-12 PROCEDURE — 96375 TX/PRO/DX INJ NEW DRUG ADDON: CPT

## 2022-03-12 PROCEDURE — 96374 THER/PROPH/DIAG INJ IV PUSH: CPT

## 2022-03-12 RX ORDER — LORAZEPAM 2 MG/ML
1 INJECTION INTRAMUSCULAR ONCE
Status: COMPLETED | OUTPATIENT
Start: 2022-03-12 | End: 2022-03-12

## 2022-03-12 RX ADMIN — LORAZEPAM 1 MG: 2 INJECTION INTRAMUSCULAR; INTRAVENOUS at 14:03

## 2022-03-12 RX ADMIN — FAMOTIDINE 20 MG: 10 INJECTION, SOLUTION INTRAVENOUS at 14:04

## 2022-03-12 RX ADMIN — SODIUM CHLORIDE 1000 ML: 9 INJECTION, SOLUTION INTRAVENOUS at 14:04

## 2022-03-12 ASSESSMENT — ENCOUNTER SYMPTOMS
SHORTNESS OF BREATH: 0
HEADACHES: 0
DIZZINESS: 0
COUGH: 0
VOMITING: 1
NAUSEA: 1
BACK PAIN: 0
CHILLS: 0
CONSTIPATION: 0
FEVER: 0
ABDOMINAL PAIN: 0
DIARRHEA: 0
DIAPHORESIS: 0

## 2022-03-12 NOTE — ED TRIAGE NOTES
This morning she woke feeling anxious and palpitation. She tried to eat but vomited. She had this happen before and was given antacid medication for it. She took that this morning as well as tylenol and feels better.

## 2022-03-12 NOTE — DISCHARGE INSTRUCTIONS
As we discussed, please discontinue alcohol use.  Please rest over the next several days, drink plenty of fluids especially fluids with electrolytes such as Gatorade or Pedialyte.  If at anytime you develop a fever, return of uncontrollable vomiting, black or bloody vomiting or stools, abdominal pain, or any new or concerning symptoms please return to the ER for further evaluation.  Otherwise, please follow-up closely in your primary care clinic.

## 2022-03-12 NOTE — ED PROVIDER NOTES
Emergency Department Encounter   NAME: Julita Fernandez ; AGE: 21 year old female ; YOB: 2000 ; MRN: [id number] ; PCP: Brooklynn Lazcano   ED PROVIDER: Cassandra Tiwari PA-C    Evaluation Date & Time:   3/12/2022  1:02 PM    CHIEF COMPLAINT:  Vomiting      Impression and Plan   MDM: Julita Fernandez is a 21 year old female with a pertinent history of upper GI bleed, alcohol abuse, and sinus tachycardia, who presents to the ED by private vehicle for evaluation of vomiting.  The patient presents to the emergency department for evaluation after one episode of clear liquid appearing vomit this morning and palpitations after heavy drinking yesterday evening.  Here in the ED, she is afebrile and vitally stable though tachycardic to the 120s.  On exam, she is showing some signs of withdrawal and a mild tremor is present.  She has not had any associated abdominal pain and her abdominal exam is completely benign -no concern at this time for acute intra-abdominal surgical or emergent pathology.  Patient did not notice any hematemesis, however was concerned that if she continued to vomit this would occur as it did previously.  Plan at this time is to rehydrate the patient.  Suspect her tachycardia is likely due to her  dehydration alcohol withdrawal.  IV fluids ordered along with a 1 mg dose of Ativan, and Pepcid.  At this point, no further labs are indicated.  Will obtain EKG and confirm she is in normal sinus rhythm as a suspect she is.    EKG shows normal sinus rhythm with a heart rate of 99.  No evidence of pathologic arrhythmia.  QTC wnl.  Patient monitored in the emergency department for 2 hours and received IV fluids, Pepcid, and 1 dose of Ativan.  On reassessment, she is eating and drinking, she feels significantly improved and has not had any further episodes of vomiting.  As she did not have any hematemesis, rectal bleeding, or melena today - no concern for GI bleed. HR gradually improving with IVF.  Tremor resolved after one dose of Ativan - and no significant or concerning withdrawal symptoms. We discussed discharge, follow-up, and reasons to return to the emergency department as well as alcohol cessation.  Patient verbalized understanding is comfortable with the plan.  Discharged home in good condition.      ED COURSE:  1:10 PM I met and introduced myself to the patient. I gathered initial history and performed my physical exam. We discussed plan for initial workup.   2:21 PM Rechecked the patient, and she has not had any further episodes of vomiting and feels generally improved.  She has drank 2 cups of juice and is requesting a meal which is a good sign.  3:28 PM patient eating and drinking without difficulty.  Heart rate improving with fluids.  She is very well-appearing.  We discussed discharge, follow-up, and reasons to return to the ED.    At the conclusion of the encounter I discussed the results of all the tests and the disposition. The questions were answered. The patient or family acknowledged understanding and was agreeable with the care plan.    FINAL IMPRESSION:    ICD-10-CM    1. Alcohol abuse  F10.10    2. Nausea with vomiting  R11.2          MEDICATIONS GIVEN IN THE EMERGENCY DEPARTMENT:  Medications   0.9% sodium chloride BOLUS (1,000 mLs Intravenous New Bag 3/12/22 1404)   LORazepam (ATIVAN) injection 1 mg (1 mg Intravenous Given 3/12/22 1403)   famotidine (PEPCID) injection 20 mg (20 mg Intravenous Given 3/12/22 1404)         NEW PRESCRIPTIONS STARTED AT TODAY'S ED VISIT:  New Prescriptions    No medications on file       HPI   Patient information was obtained from: Patient    Use of Intrepreter: N/A    Julita Fernandez is a 21 year old female with a pertinent history of upper GI bleed, alcohol abuse, and sinus tachycardia, who presents to the ED by private vehicle for evaluation of vomiting.     The patient reports a history of alcohol abuse.  She states that several weeks ago, after heavy  "drinking, she had hematemesis and was admitted here for her GI bleed. She has not drank since discharge, however did drink again last night.  She was drinking hard alcohol, and does not know how much she drank but states \"I drink a lot\".  She woke up this morning feeling generally ill, she drank a glass of water and then had one episode of emesis.  She stated the emesis looked like clear fluid consistent with her water.  She did not notice any blood or coffee-ground appearance.  She then felt that her heart was racing and she was experiencing palpitations.  She was concerned that she would have another GI bleed and presented to the ED for further evaluation.  She denies any fever, chills, abdominal or chest pain, black or bloody stools.  She denies chance of pregnancy.  She has never had a withdrawal seizure.       REVIEW OF SYSTEMS:  Review of Systems   Constitutional: Negative for chills, diaphoresis and fever.   Respiratory: Negative for cough and shortness of breath.    Cardiovascular: Negative for chest pain.   Gastrointestinal: Positive for nausea and vomiting (x1). Negative for abdominal pain, constipation and diarrhea.   Genitourinary: Negative.    Musculoskeletal: Negative for back pain.   Neurological: Negative for dizziness, syncope and headaches.   All other systems reviewed and are negative.        Medical History     Past Medical History:   Diagnosis Date     Anxiety      Gastroesophageal reflux disease        No past surgical history on file.    No family history on file.    Social History     Tobacco Use     Smoking status: Never Smoker     Smokeless tobacco: Never Used   Substance Use Topics     Alcohol use: Not on file     Drug use: Not on file       levonorgestrel (MIRENA) 20 mcg/24 hours (5 yrs) 52 mg IUD  pantoprazole (PROTONIX) 40 MG EC tablet  traZODone (DESYREL) 50 MG tablet          Physical Exam     First Vitals:  Patient Vitals for the past 24 hrs:   BP Temp Temp src Pulse Resp SpO2 Weight "   03/12/22 1502 -- -- -- 108 27 -- --   03/12/22 1301 110/65 98.4  F (36.9  C) Temporal 120 12 97 % 47 kg (103 lb 9.9 oz)         PHYSICAL EXAM:   Physical Exam  Vitals and nursing note reviewed.   Constitutional:       General: She is not in acute distress.     Appearance: She is not toxic-appearing.      Comments: Appears mildly ill.    HENT:      Head: Normocephalic.      Mouth/Throat:      Mouth: Mucous membranes are dry.   Eyes:      Conjunctiva/sclera: Conjunctivae normal.      Pupils: Pupils are equal, round, and reactive to light.   Cardiovascular:      Rate and Rhythm: Regular rhythm. Tachycardia present.      Pulses: Normal pulses.      Heart sounds: Normal heart sounds.   Pulmonary:      Effort: Pulmonary effort is normal.      Breath sounds: Normal breath sounds.   Abdominal:      General: Abdomen is flat. Bowel sounds are normal. There is no distension.      Palpations: Abdomen is soft.      Tenderness: There is no abdominal tenderness. There is no right CVA tenderness, left CVA tenderness, guarding or rebound.   Skin:     General: Skin is warm and dry.      Capillary Refill: Capillary refill takes less than 2 seconds.      Coloration: Skin is not pale.   Neurological:      Mental Status: She is alert.             Results     LAB:  All pertinent labs reviewed and interpreted  Labs Ordered and Resulted from Time of ED Arrival to Time of ED Departure - No data to display    RADIOLOGY:  No orders to display     ECG:  Performed at: 14:56:14    Impression: Normal sinus rhythm. Normal ECG.     Rate: 99 BPM  Rhythm: NSR.   Axis: 74 85 50   IA Interval: 134  QRS Interval: 84  QTc Interval: 464  ST Changes: None  Comparison: When compared to ECG from 2/13/22, sinus tachycardia is no longer present.     EKG results reviewed and interpreted by Dr. Rojas, ED MD.     Cassandra Tiwari PA-C   Emergency Medicine   [facility] EMERGENCY DEPARTMENT       Cassandra Tiwrai PA-C  03/12/22 6849

## 2022-03-14 ENCOUNTER — APPOINTMENT (OUTPATIENT)
Dept: RADIOLOGY | Facility: HOSPITAL | Age: 22
End: 2022-03-14
Attending: EMERGENCY MEDICINE
Payer: COMMERCIAL

## 2022-03-14 ENCOUNTER — HOSPITAL ENCOUNTER (EMERGENCY)
Facility: HOSPITAL | Age: 22
Discharge: HOME | End: 2022-03-14
Attending: EMERGENCY MEDICINE | Admitting: EMERGENCY MEDICINE
Payer: COMMERCIAL

## 2022-03-14 ENCOUNTER — NURSE TRIAGE (OUTPATIENT)
Dept: NURSING | Facility: CLINIC | Age: 22
End: 2022-03-14
Payer: COMMERCIAL

## 2022-03-14 VITALS
TEMPERATURE: 97.7 F | RESPIRATION RATE: 18 BRPM | DIASTOLIC BLOOD PRESSURE: 78 MMHG | WEIGHT: 100 LBS | HEART RATE: 91 BPM | BODY MASS INDEX: 17.07 KG/M2 | HEIGHT: 64 IN | SYSTOLIC BLOOD PRESSURE: 122 MMHG | OXYGEN SATURATION: 99 %

## 2022-03-14 DIAGNOSIS — K22.6 MALLORY-WEISS TEAR: ICD-10-CM

## 2022-03-14 DIAGNOSIS — R11.2 NAUSEA AND VOMITING, INTRACTABILITY OF VOMITING NOT SPECIFIED, UNSPECIFIED VOMITING TYPE: ICD-10-CM

## 2022-03-14 LAB
ABO/RH(D): NORMAL
ALBUMIN SERPL-MCNC: 4.4 G/DL (ref 3.5–5)
ALP SERPL-CCNC: 77 U/L (ref 45–120)
ALT SERPL W P-5'-P-CCNC: 14 U/L (ref 0–45)
ANION GAP SERPL CALCULATED.3IONS-SCNC: 13 MMOL/L (ref 5–18)
ANTIBODY SCREEN: NEGATIVE
APTT PPP: 28 SECONDS (ref 22–38)
AST SERPL W P-5'-P-CCNC: 20 U/L (ref 0–40)
BASOPHILS # BLD AUTO: 0 10E3/UL (ref 0–0.2)
BASOPHILS NFR BLD AUTO: 1 %
BILIRUB SERPL-MCNC: 0.7 MG/DL (ref 0–1)
BUN SERPL-MCNC: 9 MG/DL (ref 8–22)
CALCIUM SERPL-MCNC: 9.1 MG/DL (ref 8.5–10.5)
CHLORIDE BLD-SCNC: 107 MMOL/L (ref 98–107)
CO2 SERPL-SCNC: 26 MMOL/L (ref 22–31)
CREAT SERPL-MCNC: 0.79 MG/DL (ref 0.6–1.1)
EOSINOPHIL # BLD AUTO: 0.1 10E3/UL (ref 0–0.7)
EOSINOPHIL NFR BLD AUTO: 3 %
ERYTHROCYTE [DISTWIDTH] IN BLOOD BY AUTOMATED COUNT: 12.1 % (ref 10–15)
GFR SERPL CREATININE-BSD FRML MDRD: >90 ML/MIN/1.73M2
GLUCOSE BLD-MCNC: 89 MG/DL (ref 70–125)
HCG SERPL QL: NEGATIVE
HCT VFR BLD AUTO: 41 % (ref 35–47)
HGB BLD-MCNC: 13.4 G/DL (ref 11.7–15.7)
HOLD SPECIMEN: NORMAL
IMM GRANULOCYTES # BLD: 0 10E3/UL
IMM GRANULOCYTES NFR BLD: 0 %
INR PPP: 1.07 (ref 0.85–1.15)
LIPASE SERPL-CCNC: 37 U/L (ref 0–52)
LYMPHOCYTES # BLD AUTO: 2.2 10E3/UL (ref 0.8–5.3)
LYMPHOCYTES NFR BLD AUTO: 42 %
MCH RBC QN AUTO: 32.2 PG (ref 26.5–33)
MCHC RBC AUTO-ENTMCNC: 32.7 G/DL (ref 31.5–36.5)
MCV RBC AUTO: 99 FL (ref 78–100)
MONOCYTES # BLD AUTO: 0.6 10E3/UL (ref 0–1.3)
MONOCYTES NFR BLD AUTO: 11 %
NEUTROPHILS # BLD AUTO: 2.3 10E3/UL (ref 1.6–8.3)
NEUTROPHILS NFR BLD AUTO: 43 %
NRBC # BLD AUTO: 0 10E3/UL
NRBC BLD AUTO-RTO: 0 /100
PLATELET # BLD AUTO: 308 10E3/UL (ref 150–450)
POTASSIUM BLD-SCNC: 4.2 MMOL/L (ref 3.5–5)
PROT SERPL-MCNC: 7.4 G/DL (ref 6–8)
RBC # BLD AUTO: 4.16 10E6/UL (ref 3.8–5.2)
SODIUM SERPL-SCNC: 146 MMOL/L (ref 136–145)
SPECIMEN EXPIRATION DATE: NORMAL
WBC # BLD AUTO: 5.2 10E3/UL (ref 4–11)

## 2022-03-14 PROCEDURE — 250N000011 HC RX IP 250 OP 636: Performed by: EMERGENCY MEDICINE

## 2022-03-14 PROCEDURE — 83690 ASSAY OF LIPASE: CPT | Performed by: EMERGENCY MEDICINE

## 2022-03-14 PROCEDURE — 99284 EMERGENCY DEPT VISIT MOD MDM: CPT | Mod: 25

## 2022-03-14 PROCEDURE — 96374 THER/PROPH/DIAG INJ IV PUSH: CPT

## 2022-03-14 PROCEDURE — 85025 COMPLETE CBC W/AUTO DIFF WBC: CPT | Performed by: EMERGENCY MEDICINE

## 2022-03-14 PROCEDURE — 96361 HYDRATE IV INFUSION ADD-ON: CPT

## 2022-03-14 PROCEDURE — 36415 COLL VENOUS BLD VENIPUNCTURE: CPT | Performed by: EMERGENCY MEDICINE

## 2022-03-14 PROCEDURE — 258N000003 HC RX IP 258 OP 636: Performed by: EMERGENCY MEDICINE

## 2022-03-14 PROCEDURE — 71046 X-RAY EXAM CHEST 2 VIEWS: CPT

## 2022-03-14 PROCEDURE — 86901 BLOOD TYPING SEROLOGIC RH(D): CPT | Performed by: EMERGENCY MEDICINE

## 2022-03-14 PROCEDURE — 96375 TX/PRO/DX INJ NEW DRUG ADDON: CPT

## 2022-03-14 PROCEDURE — 84703 CHORIONIC GONADOTROPIN ASSAY: CPT | Performed by: EMERGENCY MEDICINE

## 2022-03-14 PROCEDURE — 85610 PROTHROMBIN TIME: CPT | Performed by: EMERGENCY MEDICINE

## 2022-03-14 PROCEDURE — 250N000009 HC RX 250: Performed by: EMERGENCY MEDICINE

## 2022-03-14 PROCEDURE — 85730 THROMBOPLASTIN TIME PARTIAL: CPT | Performed by: EMERGENCY MEDICINE

## 2022-03-14 PROCEDURE — 86850 RBC ANTIBODY SCREEN: CPT | Performed by: EMERGENCY MEDICINE

## 2022-03-14 PROCEDURE — 80053 COMPREHEN METABOLIC PANEL: CPT | Performed by: EMERGENCY MEDICINE

## 2022-03-14 RX ORDER — ONDANSETRON 4 MG/1
4 TABLET, FILM COATED ORAL EVERY 6 HOURS PRN
Qty: 20 TABLET | Refills: 0 | Status: SHIPPED | OUTPATIENT
Start: 2022-03-14 | End: 2022-03-16

## 2022-03-14 RX ORDER — PANTOPRAZOLE SODIUM 40 MG/1
40 TABLET, DELAYED RELEASE ORAL DAILY
Qty: 30 TABLET | Refills: 0 | Status: SHIPPED | OUTPATIENT
Start: 2022-03-14 | End: 2022-04-13

## 2022-03-14 RX ORDER — ONDANSETRON 2 MG/ML
4 INJECTION INTRAMUSCULAR; INTRAVENOUS EVERY 30 MIN PRN
Status: DISCONTINUED | OUTPATIENT
Start: 2022-03-14 | End: 2022-03-14 | Stop reason: HOSPADM

## 2022-03-14 RX ADMIN — SODIUM CHLORIDE 1000 ML: 9 INJECTION, SOLUTION INTRAVENOUS at 11:47

## 2022-03-14 RX ADMIN — ONDANSETRON 4 MG: 2 INJECTION INTRAMUSCULAR; INTRAVENOUS at 11:49

## 2022-03-14 RX ADMIN — FAMOTIDINE 20 MG: 10 INJECTION, SOLUTION INTRAVENOUS at 11:51

## 2022-03-14 ASSESSMENT — ENCOUNTER SYMPTOMS
VOMITING: 1
NAUSEA: 1
ABDOMINAL PAIN: 1

## 2022-03-14 NOTE — ED PROVIDER NOTES
Emergency Department Encounter     Evaluation Date & Time:   3/14/2022 12:02 PM    CHIEF COMPLAINT:  Abdominal Pain      Triage Note:Here 2 days ago for withdrawal from ETOH. Today pt diffuse abdominal pain to mid abdominal pain since 0800. Pt took her acid reflux med at 0800 and threw it up a few minutes later. She threw up one more time before coming here. Pt threw up med and water that was mixed with a few strands of blood.      FINAL IMPRESSION:    ICD-10-CM    1. Irma-Ro tear  K22.6    2. Nausea and vomiting, intractability of vomiting not specified, unspecified vomiting type  R11.2        Impression and Plan     ED COURSE & MEDICAL DECISION MAKIN:17 PM Met with patient for initial interview and exam. Plan for care in the ED was discussed. PPE: Surgical mask and cap.        ED Course as of 22 1339   Mon Mar 14, 2022   1335 Patient had one episode of emesis this morning with some streaks of blood.  She had really no associated chest pain with it although she does have some occasional abdominal discomfort its not painful at this moment.  She was nervous because she is throwing up a lot of blood in the past with recurrent emesis and did not want to have that happen again today so came in to see what could be done before she tried to eat or drink more.  She does not show any evidence of alcohol withdrawal at the time but does have a history of heavy alcohol use.  Treated empirically with Pepcid and Zofran, chest x-ray to rule out effusion to suggest complete rupture of her esophagus and otherwise should continue to follow-up as an outpatient if work-up today is normal.  Less likely this is peptic ulcer disease currently causing symptoms as her abdomen is soft and nontender.   1337 Her chart was also reviewed she had a recent hospitalization in early February for hematemesis.  At that time she was told to restart a PPI, found to have likely Irma-Ro tear but CT scan otherwise just showed  general inflammation of the lower esophagus without any evidence for esophageal varices.  Her work-up today shows unremarkable blood work, she is vitally stable, no further hematemesis since here and patient is okay to discharge home.  She should continue to take her Protonix at home and was given a prescription for Zofran to help with nausea over the course of the next day so that she did not recurrently throw up and cause worsened tearing or bleeding.       At the conclusion of the encounter I discussed the results of all the tests and the disposition. The questions were answered. The patient or family acknowledged understanding and was agreeable with the care plan.          0 minutes of critical care time        MEDICATIONS GIVEN IN THE EMERGENCY DEPARTMENT:  Medications   ondansetron (ZOFRAN) injection 4 mg (4 mg Intravenous Given 3/14/22 1149)   0.9% sodium chloride BOLUS (0 mLs Intravenous Stopped 3/14/22 1338)   famotidine (PEPCID) injection 20 mg (20 mg Intravenous Given 3/14/22 1151)       NEW PRESCRIPTIONS STARTED AT TODAY'S ED VISIT:  New Prescriptions    ONDANSETRON (ZOFRAN) 4 MG TABLET    Take 1 tablet (4 mg) by mouth every 6 hours as needed for nausea or vomiting    PANTOPRAZOLE (PROTONIX) 40 MG EC TABLET    Take 1 tablet (40 mg) by mouth daily for 30 doses       HPI     HPI     Julita Fernandez is a 21 year old female with a pertinent history of upper GI bleed, alcohol abuse, sinus tachycardia, and GERD who presents to this ED via private vehicle for evaluation of abdominal pain.    Per chart review, patient was seen here at the [facility] ED on 3/12/22 (2 days ago) for evaluation of vomiting. At this time patient reported one episode of vomiting that morning and palpitations after heavy drinking the evening before. Patient was tachycardic in the 120s. On exam she was showing some signs of withdrawal and a mild tremor was present. No abdominal pain or hematemesis. IV fluids, 1 mg dose of Ativan, and  "Pepcid were ordered. Patient monitored for 2 hours and after reassessment was eating, drinking, and felt significantly improved. HR gradually improved with IVF. Tremor was resolved after one dose of Ativan. Patient discharged in good condition.     Patient was here 2 days ago with withdrawal from ETOH. She presents today with complaints of vomiting with slight hematemesis this morning. Diffuse abdominal pain also developed this morning. Patient states she last drank 3 days ago. She notes a history of bad acid reflux and a couple of years ago states she had vomiting as a result of this. She was placed on a medication for this which improved her GERD, but has not taken this medication for the past 1 year.     Patient denies any daily ibuprofen, tylenol, or aspirin use. Denies caffeine use.     REVIEW OF SYSTEMS:  Review of Systems   Gastrointestinal: Positive for abdominal pain (diffuse), nausea and vomiting (with hematemesis).     remainder of systems are all otherwise negative.        Medical History     Past Medical History:   Diagnosis Date     Anxiety      Gastroesophageal reflux disease        No past surgical history on file.    No family history on file.    Social History     Tobacco Use     Smoking status: Never Smoker     Smokeless tobacco: Never Used   Substance Use Topics     Alcohol use: Not on file     Drug use: Not on file       ondansetron (ZOFRAN) 4 MG tablet  pantoprazole (PROTONIX) 40 MG EC tablet  levonorgestrel (MIRENA) 20 mcg/24 hours (5 yrs) 52 mg IUD  traZODone (DESYREL) 50 MG tablet        Physical Exam     First Vitals:  Patient Vitals for the past 24 hrs:   BP Temp Temp src Pulse Resp SpO2 Height Weight   03/14/22 1215 117/81 -- -- 80 16 93 % -- --   03/14/22 1156 -- -- -- 80 16 100 % -- --   03/14/22 1129 132/79 97.7  F (36.5  C) Temporal 103 16 100 % 1.626 m (5' 4\") 45.4 kg (100 lb)       PHYSICAL EXAM:   Constitutional:  Sitting upright in bed, no acute distress.   HENT:  Normocephalic, " posterior pharynx wnl, mucous membranes moist and moderately pink.   Eyes:  PERRL, EOMI, Conjunctiva normal, No discharge, no scleral icterus.  Respiratory:  Breathing easily, lungs clear to auscultation.  Cardiovascular:  Regular rate and rhythm. nl s1s2 0 murmurs, rubs, or gallops.  Peripheral pulses dp, pt, and radial are wnl.  No peripheral edema   GI:  Bowel sounds normal, Soft, No tenderness, No flank tenderness, nondistended.  :No CVA tenderness.   Musculoskeletal:  Moves all extremities.  No erythematous or swollen major joints,   Integument: Skin not diaphoretic. Normal coloration.   Lymphatic:  No cervical lymphadenopathy  Neurologic:  No tremulousness. Alert & oriented x 3, Normal motor function, Normal sensory function, No focal deficits noted. Normal speech.  Psychiatric:  Affect normal, Judgment normal, Mood normal.     Results     LAB AND RADIOLOGY:  All pertinent labs reviewed and interpreted  Results for orders placed or performed during the hospital encounter of 03/14/22   Chest XR,  PA & LAT     Status: None    Narrative    EXAM: XR CHEST 2 VW  LOCATION: [facility]  DATE/TIME: 3/14/2022 12:23 PM    INDICATION: hx o alcohol abuse.  emesis at home with small amt blood.  hx o gerd.  COMPARISON: 02/13/2022      Impression    IMPRESSION: Negative chest.   Comprehensive metabolic panel     Status: Abnormal   Result Value Ref Range    Sodium 146 (H) 136 - 145 mmol/L    Potassium 4.2 3.5 - 5.0 mmol/L    Chloride 107 98 - 107 mmol/L    Carbon Dioxide (CO2) 26 22 - 31 mmol/L    Anion Gap 13 5 - 18 mmol/L    Urea Nitrogen 9 8 - 22 mg/dL    Creatinine 0.79 0.60 - 1.10 mg/dL    Calcium 9.1 8.5 - 10.5 mg/dL    Glucose 89 70 - 125 mg/dL    Alkaline Phosphatase 77 45 - 120 U/L    AST 20 0 - 40 U/L    ALT 14 0 - 45 U/L    Protein Total 7.4 6.0 - 8.0 g/dL    Albumin 4.4 3.5 - 5.0 g/dL    Bilirubin Total 0.7 0.0 - 1.0 mg/dL    GFR Estimate >90 >60 mL/min/1.73m2   Lipase     Status: Normal    Result Value Ref Range    Lipase 37 0 - 52 U/L   INR     Status: Normal   Result Value Ref Range    INR 1.07 0.85 - 1.15   Partial thromboplastin time     Status: Normal   Result Value Ref Range    aPTT 28 22 - 38 Seconds   CBC with platelets and differential     Status: None   Result Value Ref Range    WBC Count 5.2 4.0 - 11.0 10e3/uL    RBC Count 4.16 3.80 - 5.20 10e6/uL    Hemoglobin 13.4 11.7 - 15.7 g/dL    Hematocrit 41.0 35.0 - 47.0 %    MCV 99 78 - 100 fL    MCH 32.2 26.5 - 33.0 pg    MCHC 32.7 31.5 - 36.5 g/dL    RDW 12.1 10.0 - 15.0 %    Platelet Count 308 150 - 450 10e3/uL    % Neutrophils 43 %    % Lymphocytes 42 %    % Monocytes 11 %    % Eosinophils 3 %    % Basophils 1 %    % Immature Granulocytes 0 %    NRBCs per 100 WBC 0 <1 /100    Absolute Neutrophils 2.3 1.6 - 8.3 10e3/uL    Absolute Lymphocytes 2.2 0.8 - 5.3 10e3/uL    Absolute Monocytes 0.6 0.0 - 1.3 10e3/uL    Absolute Eosinophils 0.1 0.0 - 0.7 10e3/uL    Absolute Basophils 0.0 0.0 - 0.2 10e3/uL    Absolute Immature Granulocytes 0.0 <=0.4 10e3/uL    Absolute NRBCs 0.0 10e3/uL   [place] Draw     Status: None    Narrative    The following orders were created for panel order [place] Draw.  Procedure                               Abnormality         Status                     ---------                               -----------         ------                     Extra Red Top Tube[[id number]]                               Final result                 Please view results for these tests on the individual orders.   Extra Red Top Tube     Status: None   Result Value Ref Range    Hold Specimen JIC    HCG QUALitative pregnancy (blood)     Status: Normal   Result Value Ref Range    hCG Serum Qualitative Negative Negative   Adult Type and Screen     Status: None   Result Value Ref Range    ABO/RH(D) A POS     Antibody Screen Negative Negative    SPECIMEN EXPIRATION DATE [id number]    CBC with platelets differential     Status: None    Narrative    The  following orders were created for panel order CBC with platelets differential.  Procedure                               Abnormality         Status                     ---------                               -----------         ------                     CBC with platelets and d...[[id number]]                      Final result                 Please view results for these tests on the individual orders.   ABO/Rh type and screen     Status: None    Narrative    The following orders were created for panel order ABO/Rh type and screen.  Procedure                               Abnormality         Status                     ---------                               -----------         ------                     Adult Type and Screen[[id number]]                            Final result                 Please view results for these tests on the individual orders.           The creation of this record is based on the scribe s observations of the work being performed by Vikki Welsh and the provider s statements to them. This document has been checked and approved by MD Sharri Casas MD  Emergency Medicine  [facility] EMERGENCY DEPARTMENT         Sharri Dominguez MD  03/14/22 0015

## 2022-03-14 NOTE — TELEPHONE ENCOUNTER
Patient calling in today stating 2 weeks ago she was in the ER for ETOH withdrawl. Vomiting started 1.5 hours ago. Stomach acid and water. Then she noticed drops of blood and severe abdominal pain. Abdominal pain that is constant.   Lightheaded and weak per patient. In car now in passenger seat, BF driving.   Yesterday was last drink.   Per RN protocol patient should be seen in the ER.   COVID 19 Nurse Triage Plan/Patient Instructions    Please be aware that novel coronavirus (COVID-19) may be circulating in the community. If you develop symptoms such as fever, cough, or SOB or if you have concerns about the presence of another infection including coronavirus (COVID-19), please contact your health care provider or visit [email or web address].     Disposition/Instructions    ED Visit recommended. Follow protocol based instructions.     Bring Your Own Device:  Please also bring your smart device(s) (smart phones, tablets, laptops) and their charging cables for your personal use and to communicate with your care team during your visit.    Thank you for taking steps to prevent the spread of this virus.  o Limit your contact with others.  o Wear a simple mask to cover your cough.  o Wash your hands well and often.    Resources    [facility]: About COVID-19: [email or web address]    CDC: What to Do If You're Sick: www.cdc.gov/coronavirus/2019-ncov/about/steps-when-sick.html    CDC: Ending Home Isolation: www.cdc.gov/coronavirus/2019-ncov/hcp/disposition-in-home-patients.html     CDC: Caring for Someone: www.cdc.gov/coronavirus/2019-ncov/if-you-are-sick/care-for-someone.html     [facility]: Interim Guidance for Hospital Discharge to Home: www.health.[facility].[place]/diseases/coronavirus/hcp/hospdischarge.pdf    [facility] clinical trials (COVID-19 research studies): [email or web address]-trials     Below are the COVID-19 hotlines at the [place] Department of [facility] ([facility]).  "Interpreters are available.   o For health questions: Call 190-673-7626 or 1-310.577.5212 (7 a.m. to 7 p.m.)  o For questions about schools and childcare: Call 939-467-0988 or 1-351.287.7395 (7 a.m. to 7 p.m.)                   Bere Dumont RN on 3/14/2022 at 10:56 AM    Reason for Disposition    Weak, dizzy or lightheaded    Additional Information    Negative: Shock suspected (e.g., cold/pale/clammy skin, too weak to stand, low BP, rapid pulse)    Negative: Difficult to awaken or acting confused (e.g., disoriented, slurred speech)    Negative: Unable to walk, or can only walk with assistance (e.g., requires support)    Negative: Fainted    Negative: [1] Vomited blood AND [2] large amount (example: \"a cup of blood\")    Negative: Rectal bleeding (i.e., bloody stool, blood in stool)    Negative: Sounds like a life-threatening emergency to the triager    Negative: Coughing up blood  (i.e., from lungs)    Protocols used: VOMITING BLOOD-A-AH      "

## 2022-03-14 NOTE — ED NOTES
"ED Provider In Triage Note  [facility]  Encounter Date: Mar 14, 2022    Chief Complaint   Patient presents with     Abdominal Pain       Brief HPI:   Julita Fernandez is a 21 year old female presenting to the Emergency Department with a chief complaint of diffuse abd pain, nausea and vomiting. Seen 2D ago for EtOH issues. Seeing streaks of blood.    Brief Physical Exam:  /79   Pulse 103   Temp 97.7  F (36.5  C) (Temporal)   Resp 16   Ht 1.626 m (5' 4\")   Wt 45.4 kg (100 lb)   SpO2 100%   BMI 17.16 kg/m    General: Non-toxic appearing  HEENT: Atraumatic  Resp: No respiratory distress  Abdomen: Non-peritoneal  Neuro: Alert, oriented, answers questions appropriately  Psych: Behavior appropriate      Plan Initiated in Triage:  Orders Placed This Encounter     Comprehensive metabolic panel     Lipase     INR     Partial thromboplastin time     0.9% sodium chloride BOLUS     ondansetron (ZOFRAN) injection 4 mg       PIT Dispo:   Return to lobby while awaiting workup and ED bed availability    Mala Stanley MD on 3/14/2022 at 11:29 AM    Patient was evaluated by the Physician in Triage due to a limitation of available rooms in the Emergency Department. A plan of care was discussed based on the information obtained on the initial evaluation and patient was consuled to return back to the Emergency Department lobby after this initial evalutaiton until results were obtained or a room became available in the Emergency Department. Patient was counseled not to leave prior to receiving the results of their workup.        Mala Stanley MD  03/14/22 1130    "

## 2022-03-14 NOTE — DISCHARGE INSTRUCTIONS
Just do soft foods for 3 days that are not too hot.  Then, if doing well can advance your food to normal

## 2022-03-14 NOTE — ED TRIAGE NOTES
Here 2 days ago for withdrawal from ETOH. Today pt diffuse abdominal pain to mid abdominal pain since 0800. Pt took her acid reflux med at 0800 and threw it up a few minutes later. She threw up one more time before coming here. Pt threw up med and water that was mixed with a few strands of blood.

## 2022-03-14 NOTE — Clinical Note
Julita Fernandez was seen and treated in our emergency department on 3/14/2022.         Sincerely,     [facility] Emergency Department

## 2022-03-15 PROBLEM — R07.89 CHEST WALL PAIN: Status: RESOLVED | Noted: 2021-12-09 | Resolved: 2022-03-15

## 2022-03-15 PROBLEM — F10.11 H/O ALCOHOL ABUSE: Status: ACTIVE | Noted: 2022-02-13

## 2022-03-15 PROBLEM — E87.20 LACTIC ACIDOSIS: Status: RESOLVED | Noted: 2022-02-13 | Resolved: 2022-03-15

## 2022-03-16 ENCOUNTER — OFFICE VISIT (OUTPATIENT)
Dept: FAMILY MEDICINE | Facility: CLINIC | Age: 22
End: 2022-03-16
Payer: COMMERCIAL

## 2022-03-16 VITALS
BODY MASS INDEX: 18.33 KG/M2 | HEIGHT: 65 IN | SYSTOLIC BLOOD PRESSURE: 110 MMHG | OXYGEN SATURATION: 98 % | HEART RATE: 85 BPM | WEIGHT: 110 LBS | DIASTOLIC BLOOD PRESSURE: 66 MMHG

## 2022-03-16 DIAGNOSIS — K21.01 GASTROESOPHAGEAL REFLUX DISEASE WITH ESOPHAGITIS AND HEMORRHAGE: ICD-10-CM

## 2022-03-16 DIAGNOSIS — Z78.9 ALCOHOL USE: ICD-10-CM

## 2022-03-16 DIAGNOSIS — K22.6 MALLORY-WEISS TEAR: Primary | ICD-10-CM

## 2022-03-16 LAB
ATRIAL RATE - MUSE: 99 BPM
DIASTOLIC BLOOD PRESSURE - MUSE: NORMAL MMHG
INTERPRETATION ECG - MUSE: NORMAL
P AXIS - MUSE: 74 DEGREES
PR INTERVAL - MUSE: 134 MS
QRS DURATION - MUSE: 84 MS
QT - MUSE: 362 MS
QTC - MUSE: 464 MS
R AXIS - MUSE: 85 DEGREES
SYSTOLIC BLOOD PRESSURE - MUSE: NORMAL MMHG
T AXIS - MUSE: 50 DEGREES
VENTRICULAR RATE- MUSE: 99 BPM

## 2022-03-16 PROCEDURE — 99214 OFFICE O/P EST MOD 30 MIN: CPT | Performed by: FAMILY MEDICINE

## 2022-03-16 RX ORDER — ONDANSETRON 4 MG/1
4 TABLET, FILM COATED ORAL EVERY 6 HOURS PRN
Qty: 20 TABLET | Refills: 0 | Status: SHIPPED | OUTPATIENT
Start: 2022-03-16 | End: 2022-06-06

## 2022-03-16 NOTE — PROGRESS NOTES
Assessment/plan   Julita Fernandez is a 21 year old female who is a patient of Brooklynn Lazcano.    Julita was seen today for gastric problem and vomiting.    Diagnoses and all orders for this visit:    Irma-Ro tear  In setting of vomiting from alcohol use.   Hx underlying heartburn/GERD with esophagitis on CT imaging  She has indicated desire to remain off any alcohol (since 3/11/22) for a long time and I encouraged this choice especially in the months to come as her body is healing and even up to a full year or more. Reviewed ER/hospital visits from 2/13 (GI bleed/vomiting/alcohol use) and 3/12 (alcohol withdrawal), 3/14 (irma selina tear suspected), work up and imaging. Ok to have a prn refill of the zofran if needed and she will continue the pantoprazole for 1 further month while everything heals. After that we will see her back and reassess. Strongly consider EGD especially given the CT findings of esophageal inflammation of lower esophagus (no varices) if sx not completely resolved by our next visit; sooner if worsening. She is otherwise planning to avoid any triggers and manage heartburn similiarly with dietary avoidance norm with carbonated beverages, alcohol etc.   -     ondansetron (ZOFRAN) 4 MG tablet; Take 1 tablet (4 mg) by mouth every 6 hours as needed for nausea or vomiting      31 minutes spent on the date of the encounter doing chart review, patient visit and documentation.    Follow up: Return in about 3 months (around 6/16/2022) for Annual physical.      Carolyn Latham MD    Subjective:      HPI: Julita Fernandez is a 21 year old female who is here for:    Chief Complaint   Patient presents with     Gastric Problem     follow Up ER     Vomiting     last few days after drinking some beer.        ER follow up for irma selina tear in setting of alcohol use:     She reports a hx of acid reflux in high school to the point that she was vomiting and got better with antiacids. She has been off  "those antiacids for about 1 year now.     Then in college she was drinking more alcohol (no hx of withdrawal at that time) and that made sx worse; but then moved back home and stopped alcohol overall \"because it wasn't helping\" but then in Feb she drank alcohol at an event but woke up vomiting blood. Went to the ER for that and hospitalized for alcoholic ketoacidosis and the upper GI bleed management.      Then again drank a single beer on 3/11 and so when she was throwing up in the morning and went to the ER on 3/12 and found to be in withdrawal and then presented on 3/14 due to vomiting specks of blood after drinking a carbonated [place] water and was concerned what might happen so she went to the ER for that eval as well.     Hasn't had a drink since 3/11/2022 and \"I dont' plan to go back to drinking\"    Since leaving the ER, she is drinking protein shakes and a B vitamin and she is otherwise vegetarian. In Jr year high school she was down to 90lbs due to severe heartburn/emesis but now she is up to 110lbs and happy about this good healthy weight gain.   She wants a healthy balance with exercise. She feels her siblings are \"addicted to exercise\" and doesn't want that to happen for her.  She does yoga for now.    Working with psychiatry; on trazodone for sleep/mood.     Wt Readings from Last 3 Encounters:   03/16/22 49.9 kg (110 lb) (16 %, Z= -0.99)*   03/14/22 45.4 kg (100 lb) (4 %, Z= -1.79)*   03/12/22 47 kg (103 lb 9.9 oz) (7 %, Z= -1.48)*     * Growth percentiles are based on CDC (Girls, 2-20 Years) data.         She hasn't  Had a drink since 3/11 and plans to remain off alcohol \"it isn't helping anything\".      Her chart was also reviewed she had a recent hospitalization in early February for hematemesis, severe lactic acidosis and a drug screen and urine positive for amphetamine and cannabinoids.  She denies any substance use at this time and has remained off of alcohol since 3/11/2021.    At that time she " was told to restart a PPI, found to have likely Irma-Ro tear but CT scan otherwise just showed general inflammation of the lower esophagus without any evidence for esophageal varices.      Her work-up today shows unremarkable blood work, she is vitally stable, no further hematemesis since here and patient is okay to discharge home.  She should continue to take her Protonix at home and was given a prescription for Zofran to help with nausea over the course of the next day so that she did not recurrently throw up and cause worsened tearing or bleeding.       Review of Systems:  No black or tary stools  12 point comprehensive review of systems was negative except as noted and HPI     Social History:  Social History     Social History Narrative    [employer] in [place]. Taking a break now to save money and find her career path.    Living with her parents- Tian (cardiologist) & Marija    Stopped smoking weed when she went to college.    Nicotine in high school, otherwise not vaping or smoking    Works at [employer]       Medical History:  Patient Active Problem List   Diagnosis     Generalized anxiety disorder     Strain of neck muscle, initial encounter     Strain of lumbar region, initial encounter     Gastroesophageal reflux disease, esophagitis presence not specified     Encounter for other general counseling or advice on contraception     IUD (intrauterine device) in place     Sinus tachycardia     H/O alcohol abuse     Hypoglycemia     High anion gap metabolic acidosis     UGIB (upper gastrointestinal bleed)     Past Medical History:   Diagnosis Date     Anxiety      Gastroesophageal reflux disease      History reviewed. No pertinent surgical history.  Soy [isoflavones]  History reviewed. No pertinent family history.    Medications:  Current Outpatient Medications   Medication     ondansetron (ZOFRAN) 4 MG tablet     levonorgestrel (MIRENA) 20 mcg/24 hours (5 yrs) 52 mg IUD     pantoprazole  "(PROTONIX) 40 MG EC tablet     traZODone (DESYREL) 50 MG tablet     No current facility-administered medications for this visit.         Imaging & Labs reviewed this visit:    CT scan 2/13/2022: IMPRESSION:   1.  Low-attenuation wall thickening in the visualized lower esophagus likely due to reflux esophagitis.     2.  Diffuse fatty infiltration of the liver (pt is very thin, BMI 18)     3.  Normal appendix.     4.  IUD in the central uterus.       Objective:      Vitals:    03/16/22 1042   BP: 110/66   Pulse: 85   SpO2: 98%   Weight: 49.9 kg (110 lb)   Height: 1.648 m (5' 4.88\")       Physical Exam:     General: Alert, no acute distress.   HEENT: normocephalic conjunctivae are clear, Normal pearly TMs bilaterally without erythema, pus or fluid. Position and landmarks are normal.  Nose is clear.  Oropharynx is moist and clear, without tonsillar hypertrophy, asymmetry, exudate or lesions.   Neck: supple without adenopathy or thyromegaly.  Lungs: Good aeration bilaterally. Clear to auscultation without wheezes, rales or rhonci.   Heart: regular rate and rhythm, normal S1 and S2, no murmurs  Abdomen: soft and nontender  Skin: clear without rash or lesions  Neuro: alert, interactive moving all extremities equally, normal muscle tone in all 4 extremities    Carolyn Latham MD          Answers for HPI/ROS submitted by the patient on 3/16/2022  How many servings of fruits and vegetables do you eat daily?: 4 or more  On average, how many sweetened beverages do you drink each day (Examples: soda, juice, sweet tea, etc.  Do NOT count diet or artificially sweetened beverages)?: 2  How many minutes a day do you exercise enough to make your heart beat faster?: 10 to 19  How many days a week do you exercise enough to make your heart beat faster?: 3 or less  How many days per week do you miss taking your medication?: 0  What is the reason for your visit today?: ER/Hospital follow up (nausea)  When did your symptoms begin?: More " than a month  What are your symptoms?: Acid Reflux/Nausea  How would you describe these symptoms?: Moderate  Are your symptoms:: Improving  Have you had these symptoms before?: Yes  Have you tried or received treatment for these symptoms before?: Yes  Did that treatment work? : Yes  Please describe the treatment you had:: Medication  Is there anything that makes you feel worse?: Alcohol  Is there anything that makes you feel better?: No alcohol and medication

## 2022-04-14 ENCOUNTER — OFFICE VISIT (OUTPATIENT)
Dept: FAMILY MEDICINE | Facility: CLINIC | Age: 22
End: 2022-04-14

## 2022-04-14 ENCOUNTER — NURSE TRIAGE (OUTPATIENT)
Dept: NURSING | Facility: CLINIC | Age: 22
End: 2022-04-14
Payer: COMMERCIAL

## 2022-04-14 VITALS
WEIGHT: 108.2 LBS | SYSTOLIC BLOOD PRESSURE: 104 MMHG | BODY MASS INDEX: 18.07 KG/M2 | DIASTOLIC BLOOD PRESSURE: 73 MMHG | OXYGEN SATURATION: 96 % | RESPIRATION RATE: 12 BRPM | HEART RATE: 88 BPM | TEMPERATURE: 98.1 F

## 2022-04-14 DIAGNOSIS — S61.212A LACERATION OF RIGHT MIDDLE FINGER WITHOUT FOREIGN BODY WITHOUT DAMAGE TO NAIL, INITIAL ENCOUNTER: Primary | ICD-10-CM

## 2022-04-14 PROCEDURE — 99213 OFFICE O/P EST LOW 20 MIN: CPT | Mod: 25 | Performed by: PHYSICIAN ASSISTANT

## 2022-04-14 PROCEDURE — 90471 IMMUNIZATION ADMIN: CPT | Performed by: PHYSICIAN ASSISTANT

## 2022-04-14 PROCEDURE — 90715 TDAP VACCINE 7 YRS/> IM: CPT | Performed by: PHYSICIAN ASSISTANT

## 2022-04-14 NOTE — LETTER
REPORT OF WORK ABILITY  [facility]  [place], [phone]        2022      Employer [employer]     Time In: ***  Time Out: ***    Contact: Serge  Phone: [phone]    Employer Contacted:No    Employee Name: Julita Fernandez               : 2000     SS#: [id number]  Date of Injury:  2022  Diagnosis: No diagnosis found.  Work related injury: {YES/NO:63}  Permanent Disability: {YES/NO:63}  MMI Reached: {YES:317010}    Employee's Capabilities    Lift/Carry Not at All Occassional  0-33% Frequent  34-66% Continuous  %   0-10 lbs.       11-20 lbs.       21-50 lbs.        lbs.         Push/Pull Not at All Occasional  0-33% Frequent  34-66% Continuous  %   0-25 lbs.       26-50 lbs.       51-75 lbs.        lbs.          Not at All Occasional  0-33% Frequent  34-66% Continuous  %   Bend__  Degrees       Twist/Turn       Kneel/Squat       Sit       Stand/Walk       Reaching  Above Chest Level       Ladder Stair Climber       Rotate Activities/  Position           Other work instructions:  {WORK RESTRICTIONS:251628}    Follow Up: ***      Provider Electronic Signature (as below):      Enrique Weber MD MPH  Occupational Medicine  [facility]

## 2022-04-14 NOTE — PROGRESS NOTES
Assessment & Plan:      Problem List Items Addressed This Visit    None     Visit Diagnoses     Laceration of right middle finger without foreign body without damage to nail, initial encounter    -  Primary    Relevant Orders    TDAP VACCINE (Adacel, Boostrix)  [[id number]] (Completed)        Medical Decision Making  Patient presents with an acute injury to the right hand third finger.  Examination shows a superficial laceration resulting in a small skin flap.  No need for suture repair as skin is well approximated and bleeding is well controlled.  Clinical assistant helped clean wound with Hibiclens solution.  Then applied a sterile bandage with topical antibiotics.  Tetanus immunization was updated.  Discussed expected course of healing.  Provided a note for work.  Discussed signs of worsening symptoms and when to follow-up with PCP if no symptom improvement.     Subjective:      Julita Fernandez is a 21 year old female here for evaluation of a wound to the right hand distal third finger.  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  This caused a small cut.  Patient did not clean the wound, but applied a Band-Aid.  She was able to control the bleeding without difficulty.  Patient's tetanus is not up-to-date.     The following portions of the patient's history were reviewed and updated as appropriate: allergies, current medications, and problem list.     Review of Systems  Pertinent items are noted in HPI.    Allergies  Allergies   Allergen Reactions     Soy [Isoflavones] Other (See Comments)     Facial eczema        No family history on file.    Social History     Tobacco Use     Smoking status: Never Smoker     Smokeless tobacco: Never Used   Substance Use Topics     Alcohol use: Not on file        Objective:      /73   Pulse 88   Temp 98.1  F (36.7  C) (Oral)   Resp 12   Wt 49.1 kg (108 lb 3.2 oz)   SpO2 96%   BMI 18.07 kg/m    General appearance -  alert, well appearing, and in no distress and non-toxic  Extremities - Right hand third finger: Forage motion of all finger joints without difficulty  Skin - A small laceration to the right hand third finger resulting in a skin flap about 3 mm in diameter and appearing superficial.  Skin flap tissue does appear slightly devitalized with darkening tissue.  No active bleeding.  There is immediate surrounding erythema but no significant swelling or purulence seen    The use of Dragon[employer] services was used to construct the content of this note; any grammatical errors are non-intentional. Please contact the author directly if you are in need of any clarification.

## 2022-04-14 NOTE — LETTER
[facility]  [address] [address]  Phone: [phone]  Fax: [phone]    April 14, 2022        Julita Fernandez  [id number]  [place] [address]          To whom it may concern:    RE: Julita Fernandez    Patient was seen at the clinic today for a cut on the right hand third finger.  She is excused for her time missed from work and may return immediately without restrictions.    Please contact me for questions or concerns.      Sincerely,        Itz Duran PA-C

## 2022-04-14 NOTE — TELEPHONE ENCOUNTER
"Patient calling reporting injury to right middle finger.    Reporting injury occurred while at work last evening around 650 pm. Stating she works as a  and was cleaning under the cash register.    Reporting cutting right middle finger tip on \"metal.\"     Stating laceration is in \"a circular\" cut on tip. Stating she is unsure if there is debris remaining in finger and is concerned with infection.     Band-Aid applied. Denies further discharge.     Pain at rest rated \"1\" on 1-10 pain scale. Stating area is very painful with movement.     Disposition to see with in 3 days.    Caller verbalized understanding. Denies further questions.       COVID 19 Nurse Triage Plan/Patient Instructions    Please be aware that novel coronavirus (COVID-19) may be circulating in the community. If you develop symptoms such as fever, cough, or SOB or if you have concerns about the presence of another infection including coronavirus (COVID-19), please contact your health care provider or visit [email or web address].     Disposition/Instructions    In-Person Visit with provider recommended. Reference Visit Selection Guide.    Thank you for taking steps to prevent the spread of this virus.  o Limit your contact with others.  o Wear a simple mask to cover your cough.  o Wash your hands well and often.    Resources    [facility]: About COVID-19: [email or web address]    CDC: What to Do If You're Sick: www.cdc.gov/coronavirus/2019-ncov/about/steps-when-sick.html    CDC: Ending Home Isolation: www.cdc.gov/coronavirus/2019-ncov/hcp/disposition-in-home-patients.html     CDC: Caring for Someone: www.cdc.gov/coronavirus/2019-ncov/if-you-are-sick/care-for-someone.html     [facility]: Interim Guidance for Hospital Discharge to Home: www.health.[facility].[place]/diseases/coronavirus/hcp/hospdischarge.pdf    [facility] clinical trials (COVID-19 research studies): [email or web address]-trials     Below are " the COVID-19 hotlines at the [place] Department of Health ([facility]). Interpreters are available.   o For health questions: Call 451-732-6352 or 1-217.946.6434 (7 a.m. to 7 p.m.)  o For questions about schools and childcare: Call 737-732-4629 or 1-489.573.6037 (7 a.m. to 7 p.m.)                       Reason for Disposition    [1] Last tetanus shot > 5 years ago AND [2] DIRTY cut    Additional Information    Negative: [1] Major bleeding (e.g., actively dripping or spurting) AND [2] can't be stopped    Negative: Amputation    Negative: Shock suspected (e.g., cold/pale/clammy skin, too weak to stand, low BP, rapid pulse)    Negative: [1] Knife wound (or other possibly deep cut) AND [2] to chest, abdomen, back, neck, or head    Negative: [1] Cutter (self-mutilator) AND [2] suicidal or out-of-control    Negative: Sounds like a life-threatening emergency to the triager    Negative: [1] Animal bite AND [2] broken skin    Negative: [1] Human bite AND [2] broken skin    Negative: [1] Bleeding AND [2] won't stop after 10 minutes of direct pressure (using correct technique)    Negative: Skin is split open or gaping (or length > 1/2 inch or 12 mm on the skin, 1/4 inch or 6 mm on the face)    Negative: [1] Deep cut AND [2] can see bone or tendons    Negative: Sensation of something in the wound (i.e., retained object in wound)    Negative: [1] Dirt in the wound AND [2] not removed with 15 minutes of scrubbing    Negative: Wound causes numbness (i.e., loss of sensation)    Negative: Wound causes weakness (i.e., decreased ability to move hand, finger, toe)    Negative: [1] SEVERE pain AND [2] not improved 2 hours after pain medicine    Negative: [1] Looks infected AND [2] large red area (>2 inches or 5 cm) or streak    Negative: [1] Fever AND [2] bright red area or streak    Negative: Suspicious history for the injury    Negative: [1] Looks infected (spreading redness, pus) AND [2] no fever    Negative: No prior tetanus shots (or is  not fully vaccinated)    Negative: [1] HIV positive or severe immunodeficiency (severely weak immune system) AND [2] DIRTY cut    Protocols used: CUTS AND CDENEDFRIJR-T-MC

## 2022-05-18 ENCOUNTER — TELEPHONE (OUTPATIENT)
Dept: FAMILY MEDICINE | Facility: CLINIC | Age: 22
End: 2022-05-18
Payer: COMMERCIAL

## 2022-05-18 DIAGNOSIS — K21.01 GASTROESOPHAGEAL REFLUX DISEASE WITH ESOPHAGITIS AND HEMORRHAGE: ICD-10-CM

## 2022-05-18 DIAGNOSIS — K22.6 MALLORY-WEISS TEAR: Primary | ICD-10-CM

## 2022-05-18 NOTE — TELEPHONE ENCOUNTER
5-18-22  Reason for Call:   medication refill:    Do you use a [facility]      Name of the medication requested: pantoprazole (PROTONIX) 40 MG EC tablet    Other request: none    Can we leave a detailed message on this number? YES    Phone number patient can be reached at: Cell number on file:    Telephone Information:   Mobile [phone]       Best Time: antime    Call taken on 5/18/2022 at 9:28 AM by Holley Jansen

## 2022-05-18 NOTE — TELEPHONE ENCOUNTER
Please advise. Medication not on med list currently. 3/16 note stated that you would like her to continue pantoprazole for one month then see her back but also says in the follow up section to see back in 3 months for annual. Patient is scheduled 6/20/2022.    Darcy Law LPN

## 2022-05-19 RX ORDER — PANTOPRAZOLE SODIUM 40 MG/1
40 TABLET, DELAYED RELEASE ORAL DAILY
Qty: 30 TABLET | Refills: 0 | Status: SHIPPED | OUTPATIENT
Start: 2022-05-19 | End: 2022-06-06

## 2022-05-19 NOTE — TELEPHONE ENCOUNTER
Tried calling, unable to leave a message; mailbox is full. Please relay below message from Dr. Latham when she calls back, thanks.

## 2022-05-26 ENCOUNTER — NURSE TRIAGE (OUTPATIENT)
Dept: NURSING | Facility: CLINIC | Age: 22
End: 2022-05-26
Payer: COMMERCIAL

## 2022-05-26 NOTE — TELEPHONE ENCOUNTER
"  S-(situation): Call from patient who is concerned she may have to be seen in the ED again. She reports she was drinking last night and vomit once at 10 am. Currently, alert & oriented, does not feel weak. Denies fever or seeing blood in vomit. Denies severe headache or abdominal pain. No recent injuries.      B-(background):   In Feb 2022, was drinking heavily, was in the hospital from a \"tear\" in her stomach.        A-(assessment):   ED visit on 3/14/22 -dx Irma-Wies tear d/t intractable vomiting    R-(recommendations): home care    Reviewed care advice with caller per RN triage protocol guideline.  Advised to call back with worsening symptoms, concerns or questions.   Caller verbalized understanding.          Tomy Stephens RN/[facility]    Reason for Disposition    Vomiting    Additional Information    Negative: Shock suspected (e.g., cold/pale/clammy skin, too weak to stand, low BP, rapid pulse)    Negative: Difficult to awaken or acting confused (e.g., disoriented, slurred speech)    Negative: Sounds like a life-threatening emergency to the triager    Negative: SEVERE vomiting (e.g., 6 or more times/day) (Exception: patient sounds well, is drinking liquids, does not sound dehydrated, and vomiting has lasted less than 24 hours)    Negative: MODERATE vomiting (e.g., 3 - 5 times/day) and age > 60    Negative: Vomiting contains bile (green color)    Negative: Vomiting red blood or black (coffee ground) material    Negative: Insulin-dependent diabetes and glucose > 240 mg/dL (13 mmol/L)    Negative: Recent head injury (within 3 days)    Negative: Recent abdominal injury (within 7 days)    Negative: Drinking very little and has signs of dehydration (e.g., no urine > 12 hours, very dry mouth, very lightheaded)    Negative: Constant abdominal pain lasting > 2 hours    Negative: High-risk adult (e.g., brain tumor, V-P shunt, hernia)    Negative: Severe pain in one eye    Negative: Patient sounds very " sick or weak to the triager    Negative: Vomiting and abdomen looks much more swollen than usual    Negative: Fever > 103 F (39.4 C)    Negative: Fever > 101 F (38.3 C) and over 60 years of age    Negative: Fever > 100.0 F (37.8 C) and has a weak immune system (e.g., HIV positive, cancer chemo, organ transplant, splenectomy, chronic steroids)    Negative: Fever > 100.0 F (37.8 C) and bedridden (e.g., nursing home patient, stroke, chronic illness, recovering from surgery)    Negative: Taking any of the following medications: digoxin (Lanoxin), lithium, theophylline, phenytoin (Dilantin)    Negative: SEVERE headache and vomiting    Negative: MILD to MODERATE vomiting (e.g., 1-5 times/day) and lasts > 48 hours (2 days)    Negative: Fever present > 3 days (72 hours)    Negative: Patient wants to be seen    Negative: Vomiting a prescription medication    Negative: Alcohol abuse, known or suspected    Negative: Vomiting is a chronic symptom (recurrent or ongoing AND lasting > 4 weeks)    Protocols used: VOMITING-A-OH

## 2022-06-02 ENCOUNTER — TELEPHONE (OUTPATIENT)
Dept: FAMILY MEDICINE | Facility: CLINIC | Age: 22
End: 2022-06-02
Payer: COMMERCIAL

## 2022-06-02 DIAGNOSIS — K22.6 MALLORY-WEISS TEAR: ICD-10-CM

## 2022-06-02 NOTE — TELEPHONE ENCOUNTER
Patient called looking for an update, she needs the medication refilled as soon as possible. Please advise

## 2022-06-02 NOTE — TELEPHONE ENCOUNTER
Reason for Call:  Other prescription    Detailed comments: Pt is requesting if she couple get a couple more pills for ondansetron (ZOFRAN) 4 MG tablet - she put it in the bag yesterday and saw that she had 3 pills left yesterday and checked today and the pills were not there, must've fallen out. Please advise.     Phone Number Patient can be reached at: Home number on file [phone] (home)    Best Time: any    Can we leave a detailed message on this number? Not Applicable    Call taken on 6/2/2022 at 12:44 PM by Fantasma Stephens

## 2022-06-03 DIAGNOSIS — K21.01 GASTROESOPHAGEAL REFLUX DISEASE WITH ESOPHAGITIS AND HEMORRHAGE: ICD-10-CM

## 2022-06-03 DIAGNOSIS — K22.6 MALLORY-WEISS TEAR: ICD-10-CM

## 2022-06-05 NOTE — TELEPHONE ENCOUNTER
"Routing refill request to provider for review/approval because:  Pt reports not taking ? If refill ok    Last Written Prescription Date:  5/19/22  Last Fill Quantity: 30,  # refills: 0   Last office visit provider:  4/14/22     Requested Prescriptions   Pending Prescriptions Disp Refills     pantoprazole (PROTONIX) 40 MG EC tablet [Pharmacy Med Name: PANTOPRAZOLE SOD DR 40 MG TAB] 90 tablet 1     Sig: TAKE 1 TABLET BY MOUTH EVERY DAY       PPI Protocol Passed - 6/3/2022  9:48 AM        Passed - Not on Clopidogrel (unless Pantoprazole ordered)        Passed - No diagnosis of osteoporosis on record        Passed - Recent (12 mo) or future (30 days) visit within the authorizing provider's specialty     Patient has had an office visit with the authorizing provider or a provider within the authorizing providers department within the previous 12 mos or has a future within next 30 days. See \"Patient Info\" tab in inbasket, or \"Choose Columns\" in Meds & Orders section of the refill encounter.              Passed - Medication is active on med list        Passed - Patient is age 18 or older        Passed - No active pregnacy on record        Passed - No positive pregnancy test in past 12 months             Corinne Tyler, RN 06/04/22 10:34 PM  "

## 2022-06-06 ENCOUNTER — APPOINTMENT (OUTPATIENT)
Dept: RADIOLOGY | Facility: HOSPITAL | Age: 22
End: 2022-06-06
Attending: EMERGENCY MEDICINE
Payer: COMMERCIAL

## 2022-06-06 ENCOUNTER — HOSPITAL ENCOUNTER (EMERGENCY)
Facility: HOSPITAL | Age: 22
Discharge: HOME | End: 2022-06-06
Attending: EMERGENCY MEDICINE | Admitting: EMERGENCY MEDICINE
Payer: COMMERCIAL

## 2022-06-06 ENCOUNTER — NURSE TRIAGE (OUTPATIENT)
Dept: NURSING | Facility: CLINIC | Age: 22
End: 2022-06-06
Payer: COMMERCIAL

## 2022-06-06 VITALS
DIASTOLIC BLOOD PRESSURE: 75 MMHG | HEART RATE: 73 BPM | WEIGHT: 100 LBS | BODY MASS INDEX: 17.07 KG/M2 | RESPIRATION RATE: 18 BRPM | OXYGEN SATURATION: 100 % | HEIGHT: 64 IN | TEMPERATURE: 98.4 F | SYSTOLIC BLOOD PRESSURE: 118 MMHG

## 2022-06-06 DIAGNOSIS — R07.9 CHEST PAIN, UNSPECIFIED TYPE: ICD-10-CM

## 2022-06-06 LAB
ALBUMIN SERPL-MCNC: 4.8 G/DL (ref 3.5–5)
ALP SERPL-CCNC: 54 U/L (ref 45–120)
ALT SERPL W P-5'-P-CCNC: 10 U/L (ref 0–45)
ANION GAP SERPL CALCULATED.3IONS-SCNC: 7 MMOL/L (ref 5–18)
AST SERPL W P-5'-P-CCNC: 16 U/L (ref 0–40)
BILIRUB SERPL-MCNC: 0.4 MG/DL (ref 0–1)
BUN SERPL-MCNC: 5 MG/DL (ref 8–22)
CALCIUM SERPL-MCNC: 9.7 MG/DL (ref 8.5–10.5)
CHLORIDE BLD-SCNC: 107 MMOL/L (ref 98–107)
CO2 SERPL-SCNC: 27 MMOL/L (ref 22–31)
CREAT SERPL-MCNC: 0.73 MG/DL (ref 0.6–1.1)
D DIMER PPP FEU-MCNC: <0.27 UG/ML FEU (ref 0–0.5)
ERYTHROCYTE [DISTWIDTH] IN BLOOD BY AUTOMATED COUNT: 13.2 % (ref 10–15)
GFR SERPL CREATININE-BSD FRML MDRD: >90 ML/MIN/1.73M2
GLUCOSE BLD-MCNC: 98 MG/DL (ref 70–125)
HCT VFR BLD AUTO: 39.5 % (ref 35–47)
HGB BLD-MCNC: 13.3 G/DL (ref 11.7–15.7)
LIPASE SERPL-CCNC: 44 U/L (ref 0–52)
MCH RBC QN AUTO: 31.3 PG (ref 26.5–33)
MCHC RBC AUTO-ENTMCNC: 33.7 G/DL (ref 31.5–36.5)
MCV RBC AUTO: 93 FL (ref 78–100)
PLATELET # BLD AUTO: 323 10E3/UL (ref 150–450)
POTASSIUM BLD-SCNC: 4 MMOL/L (ref 3.5–5)
PROT SERPL-MCNC: 7.5 G/DL (ref 6–8)
RBC # BLD AUTO: 4.25 10E6/UL (ref 3.8–5.2)
SODIUM SERPL-SCNC: 141 MMOL/L (ref 136–145)
TROPONIN I SERPL-MCNC: <0.01 NG/ML (ref 0–0.29)
WBC # BLD AUTO: 11.2 10E3/UL (ref 4–11)

## 2022-06-06 PROCEDURE — 71046 X-RAY EXAM CHEST 2 VIEWS: CPT

## 2022-06-06 PROCEDURE — 36415 COLL VENOUS BLD VENIPUNCTURE: CPT | Performed by: EMERGENCY MEDICINE

## 2022-06-06 PROCEDURE — 85027 COMPLETE CBC AUTOMATED: CPT | Performed by: EMERGENCY MEDICINE

## 2022-06-06 PROCEDURE — 99285 EMERGENCY DEPT VISIT HI MDM: CPT | Mod: 25

## 2022-06-06 PROCEDURE — 80053 COMPREHEN METABOLIC PANEL: CPT | Performed by: EMERGENCY MEDICINE

## 2022-06-06 PROCEDURE — 82040 ASSAY OF SERUM ALBUMIN: CPT | Performed by: EMERGENCY MEDICINE

## 2022-06-06 PROCEDURE — 84484 ASSAY OF TROPONIN QUANT: CPT | Performed by: EMERGENCY MEDICINE

## 2022-06-06 PROCEDURE — 93005 ELECTROCARDIOGRAM TRACING: CPT | Performed by: EMERGENCY MEDICINE

## 2022-06-06 PROCEDURE — 83690 ASSAY OF LIPASE: CPT | Performed by: EMERGENCY MEDICINE

## 2022-06-06 PROCEDURE — 85379 FIBRIN DEGRADATION QUANT: CPT | Performed by: EMERGENCY MEDICINE

## 2022-06-06 RX ORDER — ONDANSETRON 4 MG/1
4 TABLET, FILM COATED ORAL EVERY 6 HOURS PRN
Qty: 20 TABLET | Refills: 0 | Status: SHIPPED | OUTPATIENT
Start: 2022-06-06 | End: 2022-07-06

## 2022-06-06 RX ORDER — PANTOPRAZOLE SODIUM 40 MG/1
TABLET, DELAYED RELEASE ORAL
Qty: 90 TABLET | Refills: 1 | Status: SHIPPED | OUTPATIENT
Start: 2022-06-06 | End: 2023-01-05

## 2022-06-06 ASSESSMENT — ENCOUNTER SYMPTOMS
SHORTNESS OF BREATH: 1
LIGHT-HEADEDNESS: 1

## 2022-06-06 NOTE — ED TRIAGE NOTES
C/o chest pain and stiffness that started a few days ago but has gotten worse today.     Denies shortness of breath but states breathing does cause the chest pain to somewhat radiate towards back.

## 2022-06-06 NOTE — ED NOTES
"ED Triage Provider Note  [facility]  Encounter Date: Jun 6, 2022    History:  Chief Complaint   Patient presents with     Chest Pain     Julita Fernandez is a 21 year old female who presents to the ED with patient coming in with pleuritic chest pain for the last couple of days.  Shortness of breath associated with it..     Review of Systems:  Review of Systems   Respiratory: Positive for shortness of breath.    Cardiovascular: Positive for chest pain. Negative for leg swelling.   Neurological: Positive for light-headedness. Negative for syncope.         Exam:  /75   Pulse 73   Temp 98.4  F (36.9  C) (Oral)   Resp 18   Ht 1.626 m (5' 4\")   Wt 45.4 kg (100 lb)   LMP  (LMP Unknown)   SpO2 100%   BMI 17.16 kg/m      Physical Exam  Vitals and nursing note reviewed.   HENT:      Head: Normocephalic.      Nose: Nose normal.   Cardiovascular:      Rate and Rhythm: Normal rate.   Pulmonary:      Effort: Pulmonary effort is normal.   Neurological:      Mental Status: She is alert. Mental status is at baseline.   Psychiatric:         Mood and Affect: Mood normal.         Medical Decision Making:  Patient arriving to the ED with problem as above. A medical screening exam was performed.  Diagnostics ordered from triage.              Mac Mosquera MD on 6/6/2022 at 6:24 PM      Lab/Imaging Results:       Interventions:  Medications - No data to display         Mac Mosquera MD  06/06/22 1825    "

## 2022-06-06 NOTE — TELEPHONE ENCOUNTER
Patient calls with concerns regarding chest pain that is present under her armpits and radiates to the front into the sternum. Patient also reports that her breast are sore, tender to touch. The pain has been constant for a couple of days, she currently rates pain at a 3. Patient denies difficulty breathing but does say that she feels some restriction when trying to take a deep breath. Taking a deep breath does not make pain worse. Patient has reflux is currently on Pantoprazole. Patient reports that she does vape and smoke. Has smoked marijuana in the last 3 days.     Emergency Department per protocol. Patient verbalizes understanding and denies further questions or concerns.     Shara Lao RN  [facility] Nurse Advisors        Reason for Disposition    [1] Chest pain lasts > 5 minutes AND [2] occurred in past 3 days (72 hours) (Exception: feels exactly the same as previously diagnosed heartburn and has accompanying sour taste in mouth)    Additional Information    Negative: SEVERE difficulty breathing (e.g., struggling for each breath, speaks in single words)    Negative: Difficult to awaken or acting confused (e.g., disoriented, slurred speech)    Negative: Shock suspected (e.g., cold/pale/clammy skin, too weak to stand, low BP, rapid pulse)    Negative: Passed out (i.e., fainted, collapsed and was not responding)    Negative: [1] Chest pain lasts > 5 minutes AND [2] age > 44    Negative: [1] Chest pain lasts > 5 minutes AND [2] age > 30 AND [3] one or more cardiac risk factors (e.g., diabetes, high blood pressure, high cholesterol, smoker, or strong family history of heart disease)    Negative: [1] Chest pain lasts > 5 minutes AND [2] history of heart disease (i.e., angina, heart attack, heart failure, bypass surgery, takes nitroglycerin)    Negative: [1] Chest pain lasts > 5 minutes AND [2] described as crushing, pressure-like, or heavy    Negative: Heart beating < 50 beats per minute OR > 140 beats  "per minute    Negative: Visible sweat on face or sweat dripping down face    Negative: Sounds like a life-threatening emergency to the triager    Negative: Followed a chest injury    Negative: SEVERE chest pain    Negative: [1] Chest pain (or \"angina\") comes and goes AND [2] is happening more often (increasing in frequency) or getting worse (increasing in severity) (Exception: chest pains that last only a few seconds)    Negative: Pain also in shoulder(s) or arm(s) or jaw (Exception: pain is clearly made worse by movement)    Negative: Difficulty breathing    Negative: Dizziness or lightheadedness    Negative: Coughing up blood    Negative: Cocaine use within last 3 days    Negative: Major surgery in past month    Negative: Hip or leg fracture (broken bone) in past month (or had cast on leg or ankle in past month)    Negative: Illness requiring prolonged bedrest in past month (e.g., immobilization, long hospital stay)    Negative: Long-distance travel in past month (e.g., car, bus, train, plane; with trip lasting 6 or more hours)    Negative: History of prior \"blood clot\" in leg or lungs (i.e., deep vein thrombosis, pulmonary embolism)    Negative: History of inherited increased risk of blood clots (e.g., Factor 5 Leiden, Anti-thrombin 3, Protein C or Protein S deficiency, Prothrombin mutation)    Negative: Cancer treatment in past six months (or has cancer now)    Protocols used: CHEST PAIN-A-AH    COVID 19 Nurse Triage Plan/Patient Instructions    Please be aware that novel coronavirus (COVID-19) may be circulating in the community. If you develop symptoms such as fever, cough, or SOB or if you have concerns about the presence of another infection including coronavirus (COVID-19), please contact your health care provider or visit [email or web address].     Disposition/Instructions    ED Visit recommended. Follow protocol based instructions.     Bring Your Own Device:  Please also bring your smart device(s) " (smart phones, tablets, laptops) and their charging cables for your personal use and to communicate with your care team during your visit.    Thank you for taking steps to prevent the spread of this virus.  o Limit your contact with others.  o Wear a simple mask to cover your cough.  o Wash your hands well and often.    Resources    [facility]: About COVID-19: [email or web address]    CDC: What to Do If You're Sick: www.cdc.gov/coronavirus/2019-ncov/about/steps-when-sick.html    CDC: Ending Home Isolation: www.cdc.gov/coronavirus/2019-ncov/hcp/disposition-in-home-patients.html     CDC: Caring for Someone: www.cdc.gov/coronavirus/2019-ncov/if-you-are-sick/care-for-someone.html     [facility]: Interim Guidance for Hospital Discharge to Home: www.health.[facility].[place]/diseases/coronavirus/hcp/hospdischarge.pdf    [facility] clinical trials (COVID-19 research studies): [email or web address]-clinical-trials     Below are the COVID-19 hotlines at the [place] Department of Health ([facility]). Interpreters are available.   o For health questions: Call 799-054-6814 or 1-924.871.2330 (7 a.m. to 7 p.m.)  o For questions about schools and childcare: Call 259-511-5356 or 1-980.834.1585 (7 a.m. to 7 p.m.)

## 2022-06-07 ENCOUNTER — PATIENT OUTREACH (OUTPATIENT)
Dept: CARE COORDINATION | Facility: CLINIC | Age: 22
End: 2022-06-07
Payer: COMMERCIAL

## 2022-06-07 DIAGNOSIS — Z71.89 OTHER SPECIFIED COUNSELING: ICD-10-CM

## 2022-06-07 LAB
ATRIAL RATE - MUSE: 68 BPM
DIASTOLIC BLOOD PRESSURE - MUSE: NORMAL MMHG
INTERPRETATION ECG - MUSE: NORMAL
P AXIS - MUSE: 58 DEGREES
PR INTERVAL - MUSE: 132 MS
QRS DURATION - MUSE: 80 MS
QT - MUSE: 402 MS
QTC - MUSE: 404 MS
R AXIS - MUSE: 90 DEGREES
SYSTOLIC BLOOD PRESSURE - MUSE: NORMAL MMHG
T AXIS - MUSE: 81 DEGREES
VENTRICULAR RATE- MUSE: 61 BPM

## 2022-06-07 NOTE — DISCHARGE INSTRUCTIONS
You were seen in the Emergency Department today for chest pain and some shortness of breath.  Your lab work showed no cause of your symptoms. Your imaging studies showed no significant cause of your symptoms. Follow up with your primary care physician to ensure resolution of symptoms. Return if you have new or worsening symptoms.
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
## 2022-06-20 ENCOUNTER — OFFICE VISIT (OUTPATIENT)
Dept: FAMILY MEDICINE | Facility: CLINIC | Age: 22
End: 2022-06-20
Payer: COMMERCIAL

## 2022-06-20 VITALS
HEIGHT: 64 IN | DIASTOLIC BLOOD PRESSURE: 62 MMHG | SYSTOLIC BLOOD PRESSURE: 112 MMHG | BODY MASS INDEX: 17.08 KG/M2 | WEIGHT: 100.06 LBS | HEART RATE: 91 BPM | OXYGEN SATURATION: 98 %

## 2022-06-20 DIAGNOSIS — K21.01 GASTROESOPHAGEAL REFLUX DISEASE WITH ESOPHAGITIS AND HEMORRHAGE: ICD-10-CM

## 2022-06-20 DIAGNOSIS — K22.6 MALLORY-WEISS TEAR: Primary | ICD-10-CM

## 2022-06-20 PROCEDURE — 99213 OFFICE O/P EST LOW 20 MIN: CPT | Performed by: FAMILY MEDICINE
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
## 2022-07-03 ENCOUNTER — HOSPITAL ENCOUNTER (EMERGENCY)
Facility: HOSPITAL | Age: 22
Discharge: HOME | End: 2022-07-03
Attending: EMERGENCY MEDICINE | Admitting: EMERGENCY MEDICINE
Payer: COMMERCIAL

## 2022-07-03 VITALS
RESPIRATION RATE: 18 BRPM | HEART RATE: 84 BPM | WEIGHT: 100 LBS | OXYGEN SATURATION: 95 % | BODY MASS INDEX: 17.07 KG/M2 | TEMPERATURE: 98 F | HEIGHT: 64 IN | SYSTOLIC BLOOD PRESSURE: 143 MMHG | DIASTOLIC BLOOD PRESSURE: 85 MMHG

## 2022-07-03 DIAGNOSIS — F41.9 ANXIETY: ICD-10-CM

## 2022-07-03 LAB
ALBUMIN SERPL-MCNC: 4.6 G/DL (ref 3.5–5)
ALBUMIN UR-MCNC: 20 MG/DL
ALP SERPL-CCNC: 53 U/L (ref 45–120)
ALT SERPL W P-5'-P-CCNC: 12 U/L (ref 0–45)
AMORPH CRY #/AREA URNS HPF: ABNORMAL /HPF
AMPHETAMINES UR QL SCN: ABNORMAL
ANION GAP SERPL CALCULATED.3IONS-SCNC: 9 MMOL/L (ref 5–18)
APPEARANCE UR: ABNORMAL
AST SERPL W P-5'-P-CCNC: 22 U/L (ref 0–40)
BARBITURATES UR QL: ABNORMAL
BASOPHILS # BLD AUTO: 0 10E3/UL (ref 0–0.2)
BASOPHILS NFR BLD AUTO: 0 %
BENZODIAZ UR QL: ABNORMAL
BILIRUB SERPL-MCNC: 0.3 MG/DL (ref 0–1)
BILIRUB UR QL STRIP: NEGATIVE
BUN SERPL-MCNC: 10 MG/DL (ref 8–22)
CALCIUM SERPL-MCNC: 9.3 MG/DL (ref 8.5–10.5)
CANNABINOIDS UR QL SCN: ABNORMAL
CHLORIDE BLD-SCNC: 107 MMOL/L (ref 98–107)
CO2 SERPL-SCNC: 26 MMOL/L (ref 22–31)
COCAINE UR QL: ABNORMAL
COLOR UR AUTO: YELLOW
CREAT SERPL-MCNC: 0.81 MG/DL (ref 0.6–1.1)
CREAT UR-MCNC: 209 MG/DL
EOSINOPHIL # BLD AUTO: 0 10E3/UL (ref 0–0.7)
EOSINOPHIL NFR BLD AUTO: 0 %
ERYTHROCYTE [DISTWIDTH] IN BLOOD BY AUTOMATED COUNT: 12.6 % (ref 10–15)
ETHANOL SERPL-MCNC: <10 MG/DL
GFR SERPL CREATININE-BSD FRML MDRD: >90 ML/MIN/1.73M2
GLUCOSE BLD-MCNC: 81 MG/DL (ref 70–125)
GLUCOSE UR STRIP-MCNC: NEGATIVE MG/DL
HCG SERPL QL: NEGATIVE
HCT VFR BLD AUTO: 41 % (ref 35–47)
HGB BLD-MCNC: 13.5 G/DL (ref 11.7–15.7)
HGB UR QL STRIP: NEGATIVE
IMM GRANULOCYTES # BLD: 0 10E3/UL
IMM GRANULOCYTES NFR BLD: 0 %
KETONES UR STRIP-MCNC: ABNORMAL MG/DL
LEUKOCYTE ESTERASE UR QL STRIP: ABNORMAL
LYMPHOCYTES # BLD AUTO: 3.2 10E3/UL (ref 0.8–5.3)
LYMPHOCYTES NFR BLD AUTO: 31 %
MCH RBC QN AUTO: 30.7 PG (ref 26.5–33)
MCHC RBC AUTO-ENTMCNC: 32.9 G/DL (ref 31.5–36.5)
MCV RBC AUTO: 93 FL (ref 78–100)
METHADONE UR QL SCN: ABNORMAL
MONOCYTES # BLD AUTO: 0.9 10E3/UL (ref 0–1.3)
MONOCYTES NFR BLD AUTO: 9 %
NEUTROPHILS # BLD AUTO: 6 10E3/UL (ref 1.6–8.3)
NEUTROPHILS NFR BLD AUTO: 60 %
NITRATE UR QL: NEGATIVE
NRBC # BLD AUTO: 0 10E3/UL
NRBC BLD AUTO-RTO: 0 /100
OPIATES UR QL SCN: ABNORMAL
OXYCODONE UR QL: ABNORMAL
PCP UR QL SCN: ABNORMAL
PH UR STRIP: 8 [PH] (ref 5–7)
PLATELET # BLD AUTO: 303 10E3/UL (ref 150–450)
POTASSIUM BLD-SCNC: 3.7 MMOL/L (ref 3.5–5)
PROT SERPL-MCNC: 7.5 G/DL (ref 6–8)
RBC # BLD AUTO: 4.4 10E6/UL (ref 3.8–5.2)
RBC URINE: 0 /HPF
SODIUM SERPL-SCNC: 142 MMOL/L (ref 136–145)
SP GR UR STRIP: 1.03 (ref 1–1.03)
SQUAMOUS EPITHELIAL: 2 /HPF
TSH SERPL DL<=0.005 MIU/L-ACNC: 1.96 UIU/ML (ref 0.3–5)
UROBILINOGEN UR STRIP-MCNC: 3 MG/DL
WBC # BLD AUTO: 10.2 10E3/UL (ref 4–11)
WBC URINE: 4 /HPF

## 2022-07-03 PROCEDURE — 80053 COMPREHEN METABOLIC PANEL: CPT | Performed by: EMERGENCY MEDICINE

## 2022-07-03 PROCEDURE — 36415 COLL VENOUS BLD VENIPUNCTURE: CPT | Performed by: EMERGENCY MEDICINE

## 2022-07-03 PROCEDURE — 84443 ASSAY THYROID STIM HORMONE: CPT | Performed by: EMERGENCY MEDICINE

## 2022-07-03 PROCEDURE — 80307 DRUG TEST PRSMV CHEM ANLYZR: CPT | Performed by: EMERGENCY MEDICINE

## 2022-07-03 PROCEDURE — 84703 CHORIONIC GONADOTROPIN ASSAY: CPT | Performed by: EMERGENCY MEDICINE

## 2022-07-03 PROCEDURE — 99283 EMERGENCY DEPT VISIT LOW MDM: CPT

## 2022-07-03 PROCEDURE — 85025 COMPLETE CBC W/AUTO DIFF WBC: CPT | Performed by: EMERGENCY MEDICINE

## 2022-07-03 PROCEDURE — 81001 URINALYSIS AUTO W/SCOPE: CPT | Performed by: EMERGENCY MEDICINE

## 2022-07-03 PROCEDURE — 82077 ASSAY SPEC XCP UR&BREATH IA: CPT | Performed by: EMERGENCY MEDICINE

## 2022-07-03 RX ORDER — HYDROXYZINE PAMOATE 25 MG/1
25 CAPSULE ORAL 3 TIMES DAILY PRN
Qty: 10 CAPSULE | Refills: 0 | Status: SHIPPED | OUTPATIENT
Start: 2022-07-03 | End: 2022-07-06

## 2022-07-04 ENCOUNTER — TELEPHONE (OUTPATIENT)
Dept: FAMILY MEDICINE | Facility: CLINIC | Age: 22
End: 2022-07-04
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
## 2022-07-04 NOTE — DISCHARGE INSTRUCTIONS
You do NOT have a urinary tract infection so you do not need the antibiotics prescribed to you today by the hospital in [place]. Your urine was a contaminated sample, and today's urine sample in the ER had no infection in it.

## 2022-07-04 NOTE — ED TRIAGE NOTES
Pt traveled to [place] today and became nauseated and vomited which made her anxious. Was seen in [place] and given prescript for UTI. meds make her stomach hurt which makes her anxious. Some generalized pain feels achy. Denies SI or HI. Feels weak and lightheaded     Triage Assessment     Row Name 07/03/22 1920       Triage Assessment (Adult)    Airway WDL WDL

## 2022-07-04 NOTE — TELEPHONE ENCOUNTER
[facility] discharged 7/3/2022 - needs to be seen ASAP       Reason for Call:  Other appointment    Detailed comments: Anxiety/panic and needs to be seen Tuesday if possible    Phone Number Patient can be reached at: Cell number on file:    Telephone Information:   Mobile [phone]       Best Time: any     Can we leave a detailed message on this number? YES    Call taken on 7/4/2022 at 10:57 AM by Reba Cantrell

## 2022-07-06 ENCOUNTER — OFFICE VISIT (OUTPATIENT)
Dept: FAMILY MEDICINE | Facility: CLINIC | Age: 22
End: 2022-07-06
Payer: COMMERCIAL

## 2022-07-06 VITALS
HEIGHT: 64 IN | WEIGHT: 99.13 LBS | BODY MASS INDEX: 16.92 KG/M2 | SYSTOLIC BLOOD PRESSURE: 90 MMHG | OXYGEN SATURATION: 97 % | HEART RATE: 80 BPM | DIASTOLIC BLOOD PRESSURE: 56 MMHG

## 2022-07-06 DIAGNOSIS — M79.5 FOREIGN BODY (FB) IN SOFT TISSUE: ICD-10-CM

## 2022-07-06 DIAGNOSIS — F41.8 SITUATIONAL ANXIETY: Primary | ICD-10-CM

## 2022-07-06 PROCEDURE — 99213 OFFICE O/P EST LOW 20 MIN: CPT | Performed by: FAMILY MEDICINE

## 2022-07-06 RX ORDER — HYDROXYZINE PAMOATE 25 MG/1
25 CAPSULE ORAL 3 TIMES DAILY PRN
Qty: 60 CAPSULE | Refills: 1 | Status: SHIPPED | OUTPATIENT
Start: 2022-07-06 | End: 2022-09-19
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
## 2022-07-10 ENCOUNTER — HEALTH MAINTENANCE LETTER (OUTPATIENT)
Age: 22
End: 2022-07-10

## 2022-07-20 ENCOUNTER — OFFICE VISIT (OUTPATIENT)
Dept: FAMILY MEDICINE | Facility: CLINIC | Age: 22
End: 2022-07-20
Payer: COMMERCIAL

## 2022-07-20 VITALS
HEART RATE: 68 BPM | BODY MASS INDEX: 17.6 KG/M2 | WEIGHT: 103.1 LBS | HEIGHT: 64 IN | DIASTOLIC BLOOD PRESSURE: 58 MMHG | SYSTOLIC BLOOD PRESSURE: 98 MMHG | RESPIRATION RATE: 12 BRPM | TEMPERATURE: 98.2 F

## 2022-07-20 DIAGNOSIS — F41.1 GENERALIZED ANXIETY DISORDER: Primary | ICD-10-CM

## 2022-07-20 DIAGNOSIS — K21.9 GASTROESOPHAGEAL REFLUX DISEASE WITHOUT ESOPHAGITIS: ICD-10-CM

## 2022-07-20 PROCEDURE — 90471 IMMUNIZATION ADMIN: CPT | Performed by: FAMILY MEDICINE

## 2022-07-20 PROCEDURE — 99214 OFFICE O/P EST MOD 30 MIN: CPT | Mod: 25 | Performed by: FAMILY MEDICINE

## 2022-07-20 PROCEDURE — 90677 PCV20 VACCINE IM: CPT | Performed by: FAMILY MEDICINE

## 2022-07-20 RX ORDER — ESCITALOPRAM OXALATE 10 MG/1
TABLET ORAL
Qty: 90 TABLET | Refills: 3 | Status: SHIPPED | OUTPATIENT
Start: 2022-07-20 | End: 2023-06-13

## 2022-07-20 ASSESSMENT — ANXIETY QUESTIONNAIRES
6. BECOMING EASILY ANNOYED OR IRRITABLE: MORE THAN HALF THE DAYS
IF YOU CHECKED OFF ANY PROBLEMS ON THIS QUESTIONNAIRE, HOW DIFFICULT HAVE THESE PROBLEMS MADE IT FOR YOU TO DO YOUR WORK, TAKE CARE OF THINGS AT HOME, OR GET ALONG WITH OTHER PEOPLE: VERY DIFFICULT
GAD7 TOTAL SCORE: 20
3. WORRYING TOO MUCH ABOUT DIFFERENT THINGS: NEARLY EVERY DAY
7. FEELING AFRAID AS IF SOMETHING AWFUL MIGHT HAPPEN: NEARLY EVERY DAY
8. IF YOU CHECKED OFF ANY PROBLEMS, HOW DIFFICULT HAVE THESE MADE IT FOR YOU TO DO YOUR WORK, TAKE CARE OF THINGS AT HOME, OR GET ALONG WITH OTHER PEOPLE?: VERY DIFFICULT
4. TROUBLE RELAXING: NEARLY EVERY DAY
7. FEELING AFRAID AS IF SOMETHING AWFUL MIGHT HAPPEN: NEARLY EVERY DAY
2. NOT BEING ABLE TO STOP OR CONTROL WORRYING: NEARLY EVERY DAY
1. FEELING NERVOUS, ANXIOUS, OR ON EDGE: NEARLY EVERY DAY
GAD7 TOTAL SCORE: 20
5. BEING SO RESTLESS THAT IT IS HARD TO SIT STILL: NEARLY EVERY DAY
GAD7 TOTAL SCORE: 20

## 2022-07-20 ASSESSMENT — PATIENT HEALTH QUESTIONNAIRE - PHQ9
10. IF YOU CHECKED OFF ANY PROBLEMS, HOW DIFFICULT HAVE THESE PROBLEMS MADE IT FOR YOU TO DO YOUR WORK, TAKE CARE OF THINGS AT HOME, OR GET ALONG WITH OTHER PEOPLE: SOMEWHAT DIFFICULT
SUM OF ALL RESPONSES TO PHQ QUESTIONS 1-9: 6
SUM OF ALL RESPONSES TO PHQ QUESTIONS 1-9: 6

## 2022-07-20 NOTE — PROGRESS NOTES
Problem List Items Addressed This Visit        Medium    Generalized anxiety disorder - Primary     Did not have full relief previously from Lexapro but had some relief.  With upcoming visit with PCP in 4 weeks, will restart Lexapro today.  Follow-up as scheduled.  Given her current level anxiety, will further stabilize with unknown entity, and will discuss other type options such as fluoxetine once anxiety is slightly more stable than current.  Continue use of hydroxyzine as needed which currently is at 2 times a day.  Safety plan reviewed.    Also with her appetite suppression from the GERD, did recommend that she continue with a multivitamin until appetite under better control and weight is better stabilized.           Relevant Medications    escitalopram (LEXAPRO) 10 MG tablet    Gastroesophageal reflux disease, esophagitis presence not specified     Given history of Irma-Ro tear, and recurrent reflux type symptoms, encouraged to stay on PPI daily for at least the next year.  Continue alcohol abstinence.                  Liliana Cancino is a 21 year old who presents for the following health issues     Chief Complaint   Patient presents with     Follow Up     Anxiety; Eating Issues      Patient presents today at the encouragement of her parents.  She admits her anxiety has been slightly worse.  Additionally her mother is not coming back to practice.  She does have an appointment next week with a new counselor to establish care.  She also has an appointment the end of August with her primary care provider.  Over the past 2 months her anxiety has been worsening, she did get the appointment with her PCP to discuss further and thought she could make it to that appointment.  However she admits her anxiety over the past 2 months has been increasing.  She is no longer drinking.  Her anxiety worsens and she gets a decrease in appetite and thus has not been eating regularly and has had some weight loss.  She is  trying to take down protein shakes at least 3 times a day as well as 2 meals a day.  She is also taking a multivitamin.  She only takes her Protonix when needed but admits with her anxiety she is needing at least 3 days a week.  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  She has quit drinking.  She has very good support network.  Hydroxyzine does help calm her down when she is having severe anxiety, but her baseline anxiety has been slightly elevated.  No suicidal or homicidal ideation.  Though she has a history of harming self through cutting, she has had no feelings or thoughts of returning to that activity.  She has had no vomiting though she has had some nausea.  She is having no constipation, melena or blood in stools.  Her energy overall is doing well.  And her weight is actually improved from her last visit.    History of Present Illness       Mental Health Follow-up:  Patient presents to follow-up on Anxiety.    Patient's anxiety since last visit has been:  Worse  The patient is having other symptoms associated with anxiety.  Any significant life events: health concerns  Patient is feeling anxious or having panic attacks.  Patient has no concerns about alcohol or drug use.    Symptom onset:  More than a month  Symptoms include:  Anxiety  Symptom intensity:  Moderate  Symptom progression:  Worsening  Had these symptoms before:  Yes  What makes it worse:  Anxious about eating food/Nausea  What makes it better:  Distractions/Video Games    She eats 0-1 servings of fruits and vegetables daily.She consumes 1 sweetened beverage(s) daily.She exercises with enough effort to increase her heart rate 9 or less minutes per day.  She exercises with enough effort to increase her heart rate 7 days per week.   She is taking medications regularly.    Today's PHQ-9         PHQ-9 Total Score: 6    PHQ-9 Q9 Thoughts of better off dead/self-harm past 2 weeks :   Not at  "all    How difficult have these problems made it for you to do your work, take care of things at home, or get along with other people: Somewhat difficult  Today's MELANY-7 Score: 20       Review of Systems   All other systems reviewed and are negative.           Objective    BP 98/58 (BP Location: Left arm, Patient Position: Sitting, Cuff Size: Adult Regular)   Pulse 68   Temp 98.2  F (36.8  C) (Oral)   Resp 12   Ht 1.626 m (5' 4\")   Wt 46.8 kg (103 lb 1.6 oz)   LMP  (LMP Unknown)   Breastfeeding No   BMI 17.70 kg/m    Body mass index is 17.7 kg/m .  Physical Exam  Vitals and nursing note reviewed.   Constitutional:       General: She is not in acute distress.     Appearance: Normal appearance. She is not ill-appearing.   HENT:      Head: Normocephalic and atraumatic.   Eyes:      Extraocular Movements: Extraocular movements intact.      Conjunctiva/sclera: Conjunctivae normal.   Pulmonary:      Effort: Pulmonary effort is normal.   Skin:     Capillary Refill: Capillary refill takes less than 2 seconds.   Neurological:      Mental Status: She is alert and oriented to person, place, and time.   Psychiatric:         Attention and Perception: Attention normal.         Mood and Affect: Mood normal.         Speech: Speech normal.         Thought Content: Thought content normal.           This note has been dictated using voice recognition software. Any grammatical or context distortions are unintentional and inherent to the software      "

## 2022-07-21 NOTE — ASSESSMENT & PLAN NOTE
Given history of Irma-Ro tear, and recurrent reflux type symptoms, encouraged to stay on PPI daily for at least the next year.  Continue alcohol abstinence.   Medication refill request [facility]: fax received from [facility]  Last filled : 11/1/2018  LOV : 8/4/2020  RTC : 3 months  F/u visit : not scheduled yet  Last labs : 8/3/2020  Dx: Adult Still's disease     Rx sent per dept protocol.

## 2022-07-21 NOTE — ASSESSMENT & PLAN NOTE
Did not have full relief previously from Lexapro but had some relief.  With upcoming visit with PCP in 4 weeks, will restart Lexapro today.  Follow-up as scheduled.  Given her current level anxiety, will further stabilize with unknown entity, and will discuss other type options such as fluoxetine once anxiety is slightly more stable than current.  Continue use of hydroxyzine as needed which currently is at 2 times a day.  Safety plan reviewed.    Also with her appetite suppression from the GERD, did recommend that she continue with a multivitamin until appetite under better control and weight is better stabilized.

## 2022-08-03 ENCOUNTER — OFFICE VISIT (OUTPATIENT)
Dept: FAMILY MEDICINE | Facility: CLINIC | Age: 22
End: 2022-08-03
Payer: COMMERCIAL

## 2022-08-03 VITALS
OXYGEN SATURATION: 97 % | TEMPERATURE: 98.4 F | SYSTOLIC BLOOD PRESSURE: 120 MMHG | RESPIRATION RATE: 20 BRPM | DIASTOLIC BLOOD PRESSURE: 80 MMHG | BODY MASS INDEX: 17.75 KG/M2 | HEART RATE: 97 BPM | WEIGHT: 103.41 LBS

## 2022-08-03 DIAGNOSIS — K59.00 CONSTIPATION, UNSPECIFIED CONSTIPATION TYPE: ICD-10-CM

## 2022-08-03 DIAGNOSIS — K21.9 GASTROESOPHAGEAL REFLUX DISEASE WITHOUT ESOPHAGITIS: ICD-10-CM

## 2022-08-03 DIAGNOSIS — R00.2 PALPITATIONS: Primary | ICD-10-CM

## 2022-08-03 LAB
ANION GAP SERPL CALCULATED.3IONS-SCNC: 10 MMOL/L (ref 7–15)
BUN SERPL-MCNC: 6.1 MG/DL (ref 6–20)
CALCIUM SERPL-MCNC: 10.2 MG/DL (ref 8.6–10)
CHLORIDE SERPL-SCNC: 105 MMOL/L (ref 98–107)
CREAT SERPL-MCNC: 0.75 MG/DL (ref 0.51–0.95)
DEPRECATED HCO3 PLAS-SCNC: 26 MMOL/L (ref 22–29)
GFR SERPL CREATININE-BSD FRML MDRD: >90 ML/MIN/1.73M2
GLUCOSE SERPL-MCNC: 93 MG/DL (ref 70–99)
MAGNESIUM SERPL-MCNC: 2.6 MG/DL (ref 1.7–2.3)
POTASSIUM SERPL-SCNC: 4.8 MMOL/L (ref 3.4–5.3)
SODIUM SERPL-SCNC: 141 MMOL/L (ref 136–145)
TROPONIN I SERPL-MCNC: <0.01 NG/ML (ref 0–0.29)
TSH SERPL DL<=0.005 MIU/L-ACNC: 2.45 UIU/ML (ref 0.5–4.3)

## 2022-08-03 PROCEDURE — 36415 COLL VENOUS BLD VENIPUNCTURE: CPT | Performed by: PHYSICIAN ASSISTANT

## 2022-08-03 PROCEDURE — 93005 ELECTROCARDIOGRAM TRACING: CPT | Performed by: PHYSICIAN ASSISTANT

## 2022-08-03 PROCEDURE — 83735 ASSAY OF MAGNESIUM: CPT | Performed by: PHYSICIAN ASSISTANT

## 2022-08-03 PROCEDURE — 80048 BASIC METABOLIC PNL TOTAL CA: CPT | Performed by: PHYSICIAN ASSISTANT

## 2022-08-03 PROCEDURE — 84443 ASSAY THYROID STIM HORMONE: CPT | Performed by: PHYSICIAN ASSISTANT

## 2022-08-03 PROCEDURE — 99214 OFFICE O/P EST MOD 30 MIN: CPT | Performed by: PHYSICIAN ASSISTANT

## 2022-08-03 PROCEDURE — 84484 ASSAY OF TROPONIN QUANT: CPT | Performed by: PHYSICIAN ASSISTANT

## 2022-08-03 PROCEDURE — 93010 ELECTROCARDIOGRAM REPORT: CPT | Performed by: INTERNAL MEDICINE

## 2022-08-03 ASSESSMENT — ENCOUNTER SYMPTOMS
FEVER: 0
WHEEZING: 0
DIZZINESS: 1
CONSTIPATION: 1
CHILLS: 0
COUGH: 0
SORE THROAT: 0
SHORTNESS OF BREATH: 0
PALPITATIONS: 1
APPETITE CHANGE: 1
VOMITING: 1

## 2022-08-03 NOTE — PROGRESS NOTES
Patient presents with:  Palpitations: Constant heart palpitations, pt states she believes this is affecting her appetite, as well as acid reflex x3 weeks (pt states she had anal intercourse a few weeks ago, unsure if it caused symptoms)       Clinical Decision Making: Patient experiencing palpitations.  EKG appears normal.  Palpitation work-up was started includes troponin, BMP, TSH, magnesium.  Patient also experiencing constipation.  We discussed natural management versus incorporating Metamucil.  Patient also experiencing GERD.  H. pylori test ordered today.  Patient will continue with Protonix.  I did offer referral to cardiology, but patient's father is a cardiologist, so she said she will just talk to him.      ICD-10-CM    1. Palpitations  R00.2 EKG 12-lead, tracing only     Troponin I     Basic metabolic panel     TSH     Magnesium     Basic metabolic panel     TSH     Magnesium     Troponin I     Troponin I     Troponin I     CANCELED: Troponin I     CANCELED: Troponin I     CANCELED: Troponin I     CANCELED: Troponin I     CANCELED: Troponin I   2. Constipation, unspecified constipation type  K59.00 psyllium (METAMUCIL/KONSYL) 58.6 % powder     Troponin I     Troponin I     Troponin I     CANCELED: Troponin I     CANCELED: Troponin I     CANCELED: Troponin I     CANCELED: Troponin I   3. Gastroesophageal reflux disease without esophagitis  K21.9 Helicobacter pylori Antigen Stool     Helicobacter pylori Antigen Stool     Troponin I     Troponin I     Troponin I     CANCELED: Troponin I     CANCELED: Troponin I     CANCELED: Troponin I     CANCELED: Troponin I       Patient Instructions   1. You will be notified of your lab tests when they are available tomorrow.  They will also be visible via [employer].  If someone does not call please check your FFWDt to see if they have sent a message.  2. Complete your H. pylori test and drop it off at our lab during lab hours. 7AM-5PM week days.  3. Begin taking 1  teaspoon of Metamucil per day.  4. Incorporating partially fermented foods like Kikuchi or sauerkraut can also be natural remedies for GERD.  Continue taking pantoprazole as you have been.  5. Seek emergency medical attention if you are developing significant chest pain, shortness of breath, or leg swelling.  Also seek emergency medical attention if he had any fainting episodes.      HPI:  Julita Fernandez is a 21 year old female with past medical history of anxiety, gastroesophageal reflux, and upper GI bleed from Irma-Ro tear who presents today complaining of constant heart palpitations x 3-4 weeks.  Patient is also having decreased appetite and acid reflux for the past 3 weeks.  Patient is on pantoprazole 40 mg daily.  She denies any fevers, chills, cough, nasal congestion, sore throat, shortness of breath, wheezing, or leg swelling.    History obtained from the patient.    Problem List:  2022-02: Sinus tachycardia  2022-02: Lactic acidosis  2022-02: H/O alcohol abuse  2022-02: Hypoglycemia  2022-02: High anion gap metabolic acidosis  2022-02: UGIB (upper gastrointestinal bleed)  2021-12: Chest wall pain  2019-10: IUD (intrauterine device) in place  2019-09: Strain of neck muscle, initial encounter  2019-09: Strain of lumbar region, initial encounter  2019-09: Gastroesophageal reflux disease, esophagitis presence not   specified  2019-09: Encounter for other general counseling or advice on   contraception  2019-09: Generalized anxiety disorder      Past Medical History:   Diagnosis Date     Anxiety      Gastroesophageal reflux disease        Social History     Tobacco Use     Smoking status: Never Smoker     Smokeless tobacco: Never Used   Substance Use Topics     Alcohol use: Not Currently       Review of Systems   Constitutional: Positive for appetite change (decreased due to anxiety). Negative for chills and fever.   HENT: Negative for congestion and sore throat.    Respiratory: Negative for cough,  shortness of breath and wheezing.    Cardiovascular: Positive for palpitations (fluttering sensations). Negative for leg swelling.   Gastrointestinal: Positive for constipation (2-3 days) and vomiting (yesterday, due to palpitations).   Neurological: Positive for dizziness.       Vitals:    08/03/22 1744   BP: 120/80   BP Location: Right arm   Patient Position: Sitting   Cuff Size: Adult Regular   Pulse: 97   Resp: 20   Temp: 98.4  F (36.9  C)   TempSrc: Oral   SpO2: 97%   Weight: 46.9 kg (103 lb 6.6 oz)       Physical Exam  Vitals and nursing note reviewed.   Constitutional:       General: She is not in acute distress.     Appearance: She is not toxic-appearing or diaphoretic.   HENT:      Head: Normocephalic and atraumatic.      Right Ear: External ear normal.      Left Ear: External ear normal.   Eyes:      Conjunctiva/sclera: Conjunctivae normal.   Cardiovascular:      Rate and Rhythm: Normal rate and regular rhythm.      Heart sounds: No murmur heard.  Pulmonary:      Effort: Pulmonary effort is normal. No respiratory distress.      Breath sounds: No stridor. No wheezing, rhonchi or rales.   Abdominal:      General: Abdomen is flat. There is no distension.      Palpations: Abdomen is soft. There is no mass.      Tenderness: There is generalized abdominal tenderness. There is no right CVA tenderness, left CVA tenderness, guarding or rebound.      Hernia: No hernia is present.   Neurological:      Mental Status: She is alert.   Psychiatric:         Mood and Affect: Mood is anxious.         Behavior: Behavior normal.         Thought Content: Thought content normal.         Judgment: Judgment normal.         Results:  Results for orders placed or performed in visit on 08/03/22   EKG 12-lead, tracing only     Status: None (Preliminary result)   Result Value Ref Range    Systolic Blood Pressure  mmHg    Diastolic Blood Pressure  mmHg    Ventricular Rate 78 BPM    Atrial Rate 78 BPM    HI Interval  ms    QRS Duration  72 ms     ms    QTc 446 ms    P Axis  degrees    R AXIS 79 degrees    T Axis 54 degrees    Interpretation ECG       Sinus rhythm  Normal ECG  When compared with ECG of 06-JUN-2022 19:22,  No significant change was found           At the end of the encounter, I discussed results, diagnosis, medications. Discussed red flags for immediate return to clinic/ER, as well as indications for follow up if no improvement. Patient understood and agreed to plan. Patient was stable for discharge.

## 2022-08-03 NOTE — PATIENT INSTRUCTIONS
You will be notified of your lab tests when they are available tomorrow.  They will also be visible via [employer].  If someone does not call please check your REBIScant to see if they have sent a message.  Complete your H. pylori test and drop it off at our lab during lab hours. 7AM-5PM week days.  Begin taking 1 teaspoon of Metamucil per day.  Incorporating partially fermented foods like Kikuchi or sauerkraut can also be natural remedies for GERD.  Continue taking pantoprazole as you have been.  Seek emergency medical attention if you are developing significant chest pain, shortness of breath, or leg swelling.  Also seek emergency medical attention if he had any fainting episodes.

## 2022-08-04 LAB
ATRIAL RATE - MUSE: 78 BPM
DIASTOLIC BLOOD PRESSURE - MUSE: NORMAL MMHG
INTERPRETATION ECG - MUSE: NORMAL
P AXIS - MUSE: NORMAL DEGREES
PR INTERVAL - MUSE: NORMAL MS
QRS DURATION - MUSE: 72 MS
QT - MUSE: 392 MS
QTC - MUSE: 446 MS
R AXIS - MUSE: 79 DEGREES
SYSTOLIC BLOOD PRESSURE - MUSE: NORMAL MMHG
T AXIS - MUSE: 54 DEGREES
VENTRICULAR RATE- MUSE: 78 BPM

## 2022-08-04 PROCEDURE — 87338 HPYLORI STOOL AG IA: CPT | Performed by: PHYSICIAN ASSISTANT

## 2022-08-05 ENCOUNTER — NURSE TRIAGE (OUTPATIENT)
Dept: NURSING | Facility: CLINIC | Age: 22
End: 2022-08-05

## 2022-08-05 LAB — H PYLORI AG STL QL IA: NEGATIVE

## 2022-08-05 NOTE — TELEPHONE ENCOUNTER
Provider Response to 2nd Level Triage Request    I have reviewed the RN documentation. My recommendation is:    Since workup has been negative thus far, I recommend waiting for appointment with Dr. Latham on 8/25 to discuss further testing such as a heart rate monitor.  However, if she develops any chest pain or shortness of breath in the meantime, I would recommend seeking urgent care.    Maranda Carson NP

## 2022-08-05 NOTE — TELEPHONE ENCOUNTER
Talking to scheduling to order a heart monitor. Gone to ER and UC and has anxiety. Past month has different fluttering in chest. They've done EKG and they said it looks fine. Most of the time when it's  there it's not at its worst. Still has fluttering in chest not sure if it's the heart or reflux today. Triage results in Go To ED/UCC Now (Or To Office With PCP Approval). Please call patient at:  [phone].  May leave a detailed message.   Nurse Triage SBAR    Is this a 2nd Level Triage? YES, LICENSED PRACTITIONER REVIEW IS REQUIRED    Situation: Talking to scheduling to order a heart monitor. Gone to ER and UC and has anxiety. Past month has different fluttering in chest. They've done EKG and they said it looks fine. Most of the time when it's  there it's not at its worst. Still has fluttering in chest not sure if it's the heart or reflux today. Triage results in Go To ED/UCC Now (Or To Office With PCP Approval). Please call patient at:  [phone].  May leave a detailed message.     Background: she's been seen in the ER and UC for heart rate issues, she states it's a fluttering and would like to get an order for an heart monitor because they never get it on the EKGs that have been done because it's not happening when they check her.  She was seeking appointment today to get a monitor since she has the fluttering going on this morning.    Assessment: heart fluttering continues to occur, she'd like a heart monitor.    Protocol Recommended Disposition:   Go To ED/UCC Now (Or To Office With PCP Approval)    Recommendation: 2nd level triage to direct in level of care.     Routed to provider    Does the patient meet one of the following criteria for ADS visit consideration? No  Tiffanie Marshall RN  [facility]      Reason for Disposition    Dizziness, light-headed, feels like going to faint    Additional Information    Negative: Shock suspected (too weak to stand or walk, passed out, not moving,  unresponsive, pale cool skin, etc.)    Negative: Severe difficulty breathing (struggling for each breath, unable to speak or cry, grunting sounds, severe retractions)    Negative: Bluish lips, tongue or face    Negative: Followed a chest injury    Negative: Sounds like a life-threatening emergency to the triager    Negative: High-risk child (e.g., known heart disease or family history of sudden death)    Negative: Appears intoxicated or under the influence of drugs (e.g, methamphetamine, cocaine)    Protocols used: HEART RATE AND HEART BEAT [id number]

## 2022-08-17 ENCOUNTER — HOSPITAL ENCOUNTER (EMERGENCY)
Facility: HOSPITAL | Age: 22
Discharge: HOME | End: 2022-08-17
Attending: STUDENT IN AN ORGANIZED HEALTH CARE EDUCATION/TRAINING PROGRAM | Admitting: STUDENT IN AN ORGANIZED HEALTH CARE EDUCATION/TRAINING PROGRAM
Payer: COMMERCIAL

## 2022-08-17 VITALS
WEIGHT: 103 LBS | SYSTOLIC BLOOD PRESSURE: 126 MMHG | DIASTOLIC BLOOD PRESSURE: 88 MMHG | RESPIRATION RATE: 18 BRPM | TEMPERATURE: 98.3 F | OXYGEN SATURATION: 99 % | BODY MASS INDEX: 17.58 KG/M2 | HEART RATE: 84 BPM | HEIGHT: 64 IN

## 2022-08-17 DIAGNOSIS — S09.90XA INJURY OF HEAD, INITIAL ENCOUNTER: ICD-10-CM

## 2022-08-17 PROCEDURE — 99283 EMERGENCY DEPT VISIT LOW MDM: CPT

## 2022-08-17 ASSESSMENT — ENCOUNTER SYMPTOMS
HEADACHES: 1
NERVOUS/ANXIOUS: 1
WEAKNESS: 0
AGITATION: 1
NUMBNESS: 0
ABDOMINAL PAIN: 0

## 2022-08-17 ASSESSMENT — ACTIVITIES OF DAILY LIVING (ADL)
ADLS_ACUITY_SCORE: 33
ADLS_ACUITY_SCORE: 33
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
## 2022-08-17 NOTE — ED TRIAGE NOTES
Patient states she has trouble with anxiety and today was a bad day, felt extra stress, and took open hand and hit left side of face. Denies being suicidal, concerned about injury to head. No nausea, or dizziness. Past history of cutting, has not done that for long time     Triage Assessment     Row Name 08/17/22 1055       Triage Assessment (Adult)    Airway WDL WDL       Respiratory WDL    Respiratory WDL WDL       Skin Circulation/Temperature WDL    Skin Circulation/Temperature WDL WDL       Cardiac WDL    Cardiac WDL WDL       Peripheral/Neurovascular WDL    Peripheral Neurovascular WDL WDL       Cognitive/Neuro/Behavioral WDL    Cognitive/Neuro/Behavioral WDL WDL

## 2022-08-17 NOTE — ED NOTES
ED Provider In Triage Note  [facility]  Encounter Date: Aug 17, 2022    No chief complaint on file.      Brief HPI:   Julita Fernandez is a 21 year old female with a history of anxiety and self harm presenting to the Emergency Department after hitting herself in the head with an open hand after experiencing a stressful day. She denies any LOC.  She reports a mild headache now.  She denies any nausea or dizziness.  She denies any other trauma or injuries.     The patient denies suicidal ideation. The patient is planning on seeing her therapist later today.  She states she does not feel like she needs to see a mental health provider here in the ED.    Patient is calming down and feeling much better here in triage.     Brief Physical Exam:  There were no vitals taken for this visit.  General: Anxious appearing.   HEENT: Atraumatic  Resp: No respiratory distress  Abdomen: Non-peritoneal  Neuro: Alert, oriented, answers questions appropriately  Psych: Anxious and tearful appearing. Denies SI.       Plan Initiated in Triage:  If patient continues to calm down she can likely be discharged back home.  Patient not suicidal and she does not appear to have evidence of a concussion.  Head imaging not felt to be necessary.       PIT Dispo:   Return to [facility] while awaiting workup and ED bed availability    Denice Jorge DO on 8/17/2022 at 1:54 PM    Patient was evaluated by the Physician in Triage due to a limitation of available rooms in the Emergency Department. A plan of care was discussed based on the information obtained on the initial evaluation and patient was consuled to return back to the Emergency Department lob after this initial evalutaiton until results were obtained or a room became available in the Emergency Department. Patient was counseled not to leave prior to receiving the results of their workup.     Denice Jorge DO  [facility] EMERGENCY  DEPARTMENT  [address] [place] [id number]  [phone]     Denice Jorge,   08/17/22 6050

## 2022-08-17 NOTE — DISCHARGE INSTRUCTIONS
I do not suspect significant head injury but do return if you have changes in vision, vomiting, numbness/weakness, suicidal thoughts or other concerns for your safety. We offered a mental health assessment here which you declined, would recommend you go to your appointment today and also continue to follow up with your primary care doctor.

## 2022-08-25 ENCOUNTER — OFFICE VISIT (OUTPATIENT)
Dept: FAMILY MEDICINE | Facility: CLINIC | Age: 22
End: 2022-08-25
Payer: COMMERCIAL

## 2022-08-25 VITALS
DIASTOLIC BLOOD PRESSURE: 64 MMHG | WEIGHT: 99.4 LBS | BODY MASS INDEX: 17.06 KG/M2 | HEART RATE: 84 BPM | OXYGEN SATURATION: 92 % | SYSTOLIC BLOOD PRESSURE: 98 MMHG

## 2022-08-25 DIAGNOSIS — K21.01 GASTROESOPHAGEAL REFLUX DISEASE WITH ESOPHAGITIS AND HEMORRHAGE: ICD-10-CM

## 2022-08-25 DIAGNOSIS — F41.8 SITUATIONAL ANXIETY: Primary | ICD-10-CM

## 2022-08-25 DIAGNOSIS — K22.6 MALLORY-WEISS TEAR: ICD-10-CM

## 2022-08-25 DIAGNOSIS — R09.81 NASAL CONGESTION: ICD-10-CM

## 2022-08-25 PROCEDURE — 99214 OFFICE O/P EST MOD 30 MIN: CPT | Performed by: FAMILY MEDICINE

## 2022-08-25 RX ORDER — ONDANSETRON 4 MG/1
TABLET, FILM COATED ORAL EVERY 8 HOURS PRN
COMMUNITY
End: 2023-06-13

## 2022-08-25 ASSESSMENT — ENCOUNTER SYMPTOMS: NERVOUS/ANXIOUS: 1
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
## 2022-08-25 NOTE — PATIENT INSTRUCTIONS
flonase nasal spray (over the counter)  You can also get the consuelo pot/saline rinses (just ask the pharmacist to show you where this is at.    Start the Lexapro 1/2 tablets for 1-4 weeks, before going up to full 10mg tablet  Continue with the counseling    You can call the ortho number here: [facility]  will call you to coordinate your care as prescribed by your provider. A representative will call you within 1 business day to help schedule your appointment, or you may contact the  Representative at: [phone]    You can call the GI number here: [facility] will call you to coordinate your care for the GI team. you don t hear from a representative within 2 business days, please call [phone].

## 2022-08-26 ENCOUNTER — TELEPHONE (OUTPATIENT)
Dept: GASTROENTEROLOGY | Facility: CLINIC | Age: 22
End: 2022-08-26

## 2022-09-06 ENCOUNTER — TRANSFERRED RECORDS (OUTPATIENT)
Dept: HEALTH INFORMATION MANAGEMENT | Facility: CLINIC | Age: 22
End: 2022-09-06

## 2022-09-08 ENCOUNTER — MYC MEDICAL ADVICE (OUTPATIENT)
Dept: GASTROENTEROLOGY | Facility: CLINIC | Age: 22
End: 2022-09-08

## 2022-09-10 NOTE — TELEPHONE ENCOUNTER
[facility] Call Center    Phone Message    May a detailed message be left on voicemail: yes     Reason for Call: Appointment Intake    Referring Provider Name: Carolyn Latham MD  Diagnosis and/or Symptoms: Gastroesophageal reflux disease with esophagitis and hemorrhage [K21.01] Irma-Ro tear [K22.6]    Per triage notes, patient is to be seen as a GI Urgent, but is currently scheduled on 03/14/2023 with Dr. Sonny Oates. Current appointment is outside requested time frame. Please review for further follow up per scheduling guidelines. Thanks!     Action Taken: Message routed to:  [facility] GI    Travel Screening: Not Applicable   No

## 2022-09-11 ENCOUNTER — HEALTH MAINTENANCE LETTER (OUTPATIENT)
Age: 22
End: 2022-09-11

## 2022-09-19 ENCOUNTER — OFFICE VISIT (OUTPATIENT)
Dept: GASTROENTEROLOGY | Facility: CLINIC | Age: 22
End: 2022-09-19
Attending: FAMILY MEDICINE
Payer: COMMERCIAL

## 2022-09-19 VITALS
WEIGHT: 101.6 LBS | SYSTOLIC BLOOD PRESSURE: 120 MMHG | BODY MASS INDEX: 17.34 KG/M2 | HEIGHT: 64 IN | DIASTOLIC BLOOD PRESSURE: 76 MMHG

## 2022-09-19 DIAGNOSIS — R63.4: ICD-10-CM

## 2022-09-19 DIAGNOSIS — R11.0 NAUSEA: Primary | ICD-10-CM

## 2022-09-19 DIAGNOSIS — K22.6 MALLORY-WEISS TEAR: ICD-10-CM

## 2022-09-19 DIAGNOSIS — K21.01 GASTROESOPHAGEAL REFLUX DISEASE WITH ESOPHAGITIS AND HEMORRHAGE: ICD-10-CM

## 2022-09-19 DIAGNOSIS — K92.0 HEMATEMESIS, PRESENCE OF NAUSEA NOT SPECIFIED: ICD-10-CM

## 2022-09-19 LAB — CA-I BLD-MCNC: 4.8 MG/DL (ref 4.4–5.2)

## 2022-09-19 PROCEDURE — 99203 OFFICE O/P NEW LOW 30 MIN: CPT | Performed by: INTERNAL MEDICINE

## 2022-09-19 PROCEDURE — 86364 TISS TRNSGLTMNASE EA IG CLAS: CPT | Performed by: INTERNAL MEDICINE

## 2022-09-19 PROCEDURE — 82330 ASSAY OF CALCIUM: CPT | Performed by: INTERNAL MEDICINE

## 2022-09-19 PROCEDURE — 36415 COLL VENOUS BLD VENIPUNCTURE: CPT | Performed by: INTERNAL MEDICINE

## 2022-09-19 RX ORDER — HYDROXYZINE PAMOATE 25 MG/1
25 CAPSULE ORAL 3 TIMES DAILY PRN
COMMUNITY
End: 2023-04-07

## 2022-09-19 ASSESSMENT — PAIN SCALES - GENERAL: PAINLEVEL: NO PAIN (0)

## 2022-09-19 NOTE — PROGRESS NOTES
Gastroenterology    21-year-old to review GI symptoms.    Patient notes classic heartburn in the morning and this typically goes away if she eats or drinks even some water or some Gatorade.  She has been placed on Protonix and takes his before morning meal.  There are no alarm features.  There was an event in February where there was some mild vomiting and she was clinically diagnosed with a Irma-Ro tear abdominal CT suggested a esophagitis.    Concerns about patient's weight and she has been advised to have a meal supplement as a source of additional calories.  BMI is 17.44 recent TSH was abnormal.      Past medical history: Heartburn, neck strain, anxiety,    Medications reviewed on epic    Allergies reviewed on epic    Social: No tobacco.  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  Father is a cardiologist in [place].    Review of systems otherwise negative noncontributory    Upon exam no acute distress.  Blood pressure 120/76, pulse 84, weight 101, BMI 17.44, head and neck unremarkable cardiac S1-S2 without murmur, lungs clear throughout abdomen soft nontender without organomegaly no lower extremity edema skin without rash.    Impression: Heartburn with the goal of symptom control.  Events of hematemesis reviewed from February.  Reassurance regarding 2-week intervals, reassurance regarding associations with PPIs and then discussed safety profile of proton pump inhibitors with no increased risk of morbidity mortality and [place] [employer] quality studies.    Plan: 1.  Protonix preevening meal may help in the morning, 2.  No clear need for EGD at this point goals of symptom control.  Pepcid and other H2RA medications may suffer from tachyphylaxis.,  3.  Additional caloric intake as doing. 4. Check TTG and ionized calcium for labs    As needed return    As needed return

## 2022-09-19 NOTE — LETTER
9/19/2022         RE: Julita Fernandez  [address]  [place] [address]        Dear Colleague,    Thank you for referring your patient, Julita Fernandez, to the [facility]. Please see a copy of my visit note below.    Gastroenterology    21-year-old to review GI symptoms.    Patient notes classic heartburn in the morning and this typically goes away if she eats or drinks even some water or some Gatorade.  She has been placed on Protonix and takes his before morning meal.  There are no alarm features.  There was an event in February where there was some mild vomiting and she was clinically diagnosed with a Irma-Ro tear abdominal CT suggested a esophagitis.    Concerns about patient's weight and she has been advised to have a meal supplement as a source of additional calories.  BMI is 17.44 recent TSH was abnormal.      Past medical history: Heartburn, neck strain, anxiety,    Medications reviewed on epic    Allergies reviewed on epic    Social: No tobacco.  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  Father is a cardiologist in [place].    Review of systems otherwise negative noncontributory    Upon exam no acute distress.  Blood pressure 120/76, pulse 84, weight 101, BMI 17.44, head and neck unremarkable cardiac S1-S2 without murmur, lungs clear throughout abdomen soft nontender without organomegaly no lower extremity edema skin without rash.    Impression: Heartburn with the goal of symptom control.  Events of hematemesis reviewed from February.  Reassurance regarding 2-week intervals, reassurance regarding associations with PPIs and then discussed safety profile of proton pump inhibitors with no increased risk of morbidity mortality and [place] [employer] quality studies.    Plan: 1.  Protonix preevening meal may help in the morning, 2.  No clear need for EGD at this point goals of symptom control.  Pepcid and other H2RA medications may suffer from  tachyphylaxis.,  3.  Additional caloric intake as doing. 4. Check TTG and ionized calcium for labs    As needed return    As needed return      Again, thank you for allowing me to participate in the care of your patient.        Sincerely,        Heber Newman MD

## 2022-09-19 NOTE — PATIENT INSTRUCTIONS
As we discussed    1.  We will check labs with her previous concern of calcium level and test for celiac disease    2.  Pantoprazole is a good choice for heartburn.  Perhaps taking this 30 minutes 4 to 60 minutes preevening meal may help symptoms in the morning  Tums and Rolaids as needed are fine choices.  Famotidine as an option may not be as effective.  The goal is symptom control.    3.  A meal supplements are a good idea with additional calories to help weight gain.    As needed return

## 2022-09-19 NOTE — LETTER
September 20, 2022      Julita Fernandez  [address] [address] [place] [address]        Dear ,    We are writing to inform you of your test results.    These are normal. No celiac.  The calcium test is normal.     Resulted Orders   Ionized Calcium   Result Value Ref Range    Calcium Ionized 4.8 4.4 - 5.2 mg/dL   Tissue transglutaminase layla IgA and IgG   Result Value Ref Range    Tissue Transglutaminase Antibody IgA 0.3 <7.0 U/mL      Comment:      Negative- The tTG-IgA assay has limited utility for patients with decreased levels of IgA. Screening for celiac disease should include IgA testing to rule out selective IgA deficiency and to guide selection and interpretation of serological testing. tTG-IgG testing may be positive in celiac disease patients with IgA deficiency.    Tissue Transglutaminase Antibody IgG <0.6 <7.0 U/mL      Comment:      Negative       If you have any questions or concerns, please call the clinic at the number listed above.       Sincerely,      Heber Newman MD

## 2022-09-20 LAB
TTG IGA SER-ACNC: 0.3 U/ML
TTG IGG SER-ACNC: <0.6 U/ML

## 2022-10-20 ENCOUNTER — TRANSFERRED RECORDS (OUTPATIENT)
Dept: HEALTH INFORMATION MANAGEMENT | Facility: CLINIC | Age: 22
End: 2022-10-20

## 2022-11-07 ENCOUNTER — TRANSFERRED RECORDS (OUTPATIENT)
Dept: HEALTH INFORMATION MANAGEMENT | Facility: CLINIC | Age: 22
End: 2022-11-07

## 2022-11-26 ENCOUNTER — HOSPITAL ENCOUNTER (EMERGENCY)
Facility: HOSPITAL | Age: 22
Discharge: HOME | End: 2022-11-26
Attending: EMERGENCY MEDICINE | Admitting: EMERGENCY MEDICINE
Payer: COMMERCIAL

## 2022-11-26 VITALS
BODY MASS INDEX: 18.02 KG/M2 | OXYGEN SATURATION: 100 % | DIASTOLIC BLOOD PRESSURE: 61 MMHG | SYSTOLIC BLOOD PRESSURE: 109 MMHG | HEART RATE: 105 BPM | WEIGHT: 105 LBS | TEMPERATURE: 97.7 F | RESPIRATION RATE: 16 BRPM

## 2022-11-26 DIAGNOSIS — D72.829 LEUKOCYTOSIS, UNSPECIFIED TYPE: ICD-10-CM

## 2022-11-26 DIAGNOSIS — R10.10 UPPER ABDOMINAL PAIN: ICD-10-CM

## 2022-11-26 DIAGNOSIS — R74.8 ELEVATED LIVER ENZYMES: ICD-10-CM

## 2022-11-26 DIAGNOSIS — R11.2 NAUSEA AND VOMITING, UNSPECIFIED VOMITING TYPE: ICD-10-CM

## 2022-11-26 LAB
ALBUMIN SERPL BCG-MCNC: 4.9 G/DL (ref 3.5–5.2)
ALP SERPL-CCNC: 76 U/L (ref 35–104)
ALT SERPL W P-5'-P-CCNC: 38 U/L (ref 10–35)
ANION GAP SERPL CALCULATED.3IONS-SCNC: 15 MMOL/L (ref 7–15)
AST SERPL W P-5'-P-CCNC: 61 U/L (ref 10–35)
BILIRUB DIRECT SERPL-MCNC: <0.2 MG/DL (ref 0–0.3)
BILIRUB SERPL-MCNC: 0.3 MG/DL
BUN SERPL-MCNC: 17.2 MG/DL (ref 6–20)
CALCIUM SERPL-MCNC: 9.1 MG/DL (ref 8.6–10)
CHLORIDE SERPL-SCNC: 103 MMOL/L (ref 98–107)
CREAT SERPL-MCNC: 0.69 MG/DL (ref 0.51–0.95)
DEPRECATED HCO3 PLAS-SCNC: 21 MMOL/L (ref 22–29)
ERYTHROCYTE [DISTWIDTH] IN BLOOD BY AUTOMATED COUNT: 12.5 % (ref 10–15)
ETHANOL SERPL-MCNC: <0.01 G/DL
GFR SERPL CREATININE-BSD FRML MDRD: >90 ML/MIN/1.73M2
GLUCOSE SERPL-MCNC: 97 MG/DL (ref 70–99)
HCG SERPL QL: NEGATIVE
HCT VFR BLD AUTO: 37.3 % (ref 35–47)
HGB BLD-MCNC: 12.2 G/DL (ref 11.7–15.7)
HOLD SPECIMEN: NORMAL
INR PPP: 1.13 (ref 0.85–1.15)
LIPASE SERPL-CCNC: 22 U/L (ref 13–60)
MAGNESIUM SERPL-MCNC: 2.1 MG/DL (ref 1.7–2.3)
MCH RBC QN AUTO: 30.5 PG (ref 26.5–33)
MCHC RBC AUTO-ENTMCNC: 32.7 G/DL (ref 31.5–36.5)
MCV RBC AUTO: 93 FL (ref 78–100)
PLATELET # BLD AUTO: 366 10E3/UL (ref 150–450)
POTASSIUM SERPL-SCNC: 3.9 MMOL/L (ref 3.4–5.3)
PROT SERPL-MCNC: 7.3 G/DL (ref 6.4–8.3)
RBC # BLD AUTO: 4 10E6/UL (ref 3.8–5.2)
SODIUM SERPL-SCNC: 139 MMOL/L (ref 136–145)
WBC # BLD AUTO: 20.2 10E3/UL (ref 4–11)

## 2022-11-26 PROCEDURE — 84703 CHORIONIC GONADOTROPIN ASSAY: CPT | Performed by: EMERGENCY MEDICINE

## 2022-11-26 PROCEDURE — 82077 ASSAY SPEC XCP UR&BREATH IA: CPT | Performed by: EMERGENCY MEDICINE

## 2022-11-26 PROCEDURE — 36415 COLL VENOUS BLD VENIPUNCTURE: CPT | Performed by: EMERGENCY MEDICINE

## 2022-11-26 PROCEDURE — 83735 ASSAY OF MAGNESIUM: CPT | Performed by: EMERGENCY MEDICINE

## 2022-11-26 PROCEDURE — 96374 THER/PROPH/DIAG INJ IV PUSH: CPT

## 2022-11-26 PROCEDURE — 93005 ELECTROCARDIOGRAM TRACING: CPT | Performed by: EMERGENCY MEDICINE

## 2022-11-26 PROCEDURE — 85610 PROTHROMBIN TIME: CPT | Performed by: EMERGENCY MEDICINE

## 2022-11-26 PROCEDURE — 99285 EMERGENCY DEPT VISIT HI MDM: CPT | Mod: 25

## 2022-11-26 PROCEDURE — 83690 ASSAY OF LIPASE: CPT | Performed by: EMERGENCY MEDICINE

## 2022-11-26 PROCEDURE — 85027 COMPLETE CBC AUTOMATED: CPT | Performed by: EMERGENCY MEDICINE

## 2022-11-26 PROCEDURE — 258N000003 HC RX IP 258 OP 636: Performed by: EMERGENCY MEDICINE

## 2022-11-26 PROCEDURE — 96375 TX/PRO/DX INJ NEW DRUG ADDON: CPT

## 2022-11-26 PROCEDURE — 96361 HYDRATE IV INFUSION ADD-ON: CPT

## 2022-11-26 PROCEDURE — 82248 BILIRUBIN DIRECT: CPT | Performed by: EMERGENCY MEDICINE

## 2022-11-26 PROCEDURE — 250N000011 HC RX IP 250 OP 636: Performed by: EMERGENCY MEDICINE

## 2022-11-26 RX ORDER — LORAZEPAM 2 MG/ML
0.5 INJECTION INTRAMUSCULAR ONCE
Status: COMPLETED | OUTPATIENT
Start: 2022-11-26 | End: 2022-11-26

## 2022-11-26 RX ORDER — ONDANSETRON 4 MG/1
4 TABLET, ORALLY DISINTEGRATING ORAL EVERY 8 HOURS PRN
Qty: 10 TABLET | Refills: 0 | Status: SHIPPED | OUTPATIENT
Start: 2022-11-26 | End: 2023-06-13

## 2022-11-26 RX ORDER — ONDANSETRON 4 MG/1
4 TABLET, ORALLY DISINTEGRATING ORAL ONCE
Status: DISCONTINUED | OUTPATIENT
Start: 2022-11-26 | End: 2022-11-26

## 2022-11-26 RX ADMIN — FAMOTIDINE 20 MG: 10 INJECTION, SOLUTION INTRAVENOUS at 16:48

## 2022-11-26 RX ADMIN — SODIUM CHLORIDE, POTASSIUM CHLORIDE, SODIUM LACTATE AND CALCIUM CHLORIDE 1000 ML: 600; 310; 30; 20 INJECTION, SOLUTION INTRAVENOUS at 16:53

## 2022-11-26 RX ADMIN — LORAZEPAM 0.5 MG: 2 INJECTION INTRAMUSCULAR; INTRAVENOUS at 16:51

## 2022-11-26 ASSESSMENT — ACTIVITIES OF DAILY LIVING (ADL): ADLS_ACUITY_SCORE: 35

## 2022-11-26 NOTE — ED TRIAGE NOTES
Patient presents here for evaluation of persistent vomiting. She had been drinking alcohol last night. She notes that she can't retain any oral fluids.

## 2022-11-26 NOTE — DISCHARGE INSTRUCTIONS
Please follow-up with your primary care provider this upcoming week for recheck; call to arrange appointment.    Return to the ER for worsening symptoms, recurrent abdominal pain, persistent nausea / vomiting, fever or other concerns.    Drink frequent, small sips of fluids, such as Gatorade, to stay hydrated and advance your diet slowly as tolerated.

## 2022-11-26 NOTE — ED PROVIDER NOTES
Emergency Department Encounter     Evaluation Date & Time:   No admission date for patient encounter.    CHIEF COMPLAINT:  Vomiting      Triage Note:Patient presents here for evaluation of persistent vomiting. She had been drinking alcohol last night. She notes that she can't retain any oral fluids.             Impression and Plan       FINAL IMPRESSION:    ICD-10-CM    1. Nausea and vomiting, unspecified vomiting type  R11.2       2. Upper abdominal pain  R10.10       3. Leukocytosis, unspecified type  D72.829       4. Elevated liver enzymes  R74.8             ED COURSE & MEDICAL DECISION MAKING:  3:01 PM I met with the patient for an initial exam and interview. Plans for care were discussed.  5:26 PM I reassessed the patient.    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 She reports some pain in her stomach that started after the vomiting, which she attributes to the vomiting. No diarrhea, fevers.     She also reports some substernal chest pressure and pounding palpitations, which she attributes to her anxiety.      On exam, she has tachycardic rate with regular rhythm and clear lungs.  Abdomen is soft and non-tender to palpation; I do not suspect appendicitis, bowel obstruction, intra-abdominal abscess, colitis or other surgical emergency and do not think imaging studies are indicated.    EKG performed and demonstrated sinus tachycardia with non-specific T wave flattening / inversions and no ST-T wave elevation consistent with ACS or pericarditis.    IV access established, blood sent for labs and IV fluids initiated.  Patient was given IV famotidine and IV Ativan for nausea and anxiety.    UPT negative.  Labs otherwise remarkable for leukocytosis (WBC 20.2), which I  suspect is secondary to stress demargination from vomiting and not due to infection.  She has no anemia, significant electrolyte derangements or renal impairment.  Liver enzymes are mildly elevated (AST 61, ALT 38), which I suspect is likely secondary to alcohol use.  No laboratory evidence of biliary obstruction or pancreatitis.    Patient feeling significantly better after IV fluids and medications and is tolerating po.  Repeat abdominal exam benign.    Patient discharged to home with follow-up with her primary care provider.  She was given a prescription for ODT Zofran and cautioned against alcohol use.  Return precautions provided.  Patient stable throughout ED course.      At the conclusion of the encounter I discussed the results of all the tests and the disposition. The questions were answered. The patient acknowledged understanding and was agreeable with the care plan.        MEDICATIONS GIVEN IN THE EMERGENCY DEPARTMENT:  Medications   lactated ringers BOLUS 1,000 mL (0 mLs Intravenous Stopped 11/26/22 1800)   LORazepam (ATIVAN) injection 0.5 mg (0.5 mg Intravenous Given 11/26/22 1651)   famotidine (PEPCID) injection 20 mg (20 mg Intravenous Given 11/26/22 1648)       NEW PRESCRIPTIONS STARTED AT TODAY'S ED VISIT:  Discharge Medication List as of 11/26/2022  6:01 PM      START taking these medications    Details   ondansetron (ZOFRAN ODT) 4 MG ODT tab Take 1 tablet (4 mg) by mouth every 8 hours as needed for nausea or vomiting, Disp-10 tablet, R-0, Local Print             HPI     HPI     Julita Fernandez is a 22 year old female, history of GERD, alcohol abuse, upper GI bleed secondary to Irma-Ro tear and anxiety, who presents to this ED for evaluation of vomiting.     Patient drank an excessive amount of alcohol last night for a birthday celebration and developed nausea with multiple episodes of vomiting since. Denies hematemesis.  She reports some pain in her stomach that started after the vomiting,  which she attributes to the vomiting. No associated diarrhea.  She has been unable to tolerate fluids.      She also reports some substernal chest pressure, which she attributes to her anxiety.  She has had associated heart pounding palpitations.    She has otherwise been in her usual state of health and denies shortness of breath, cough, fever or other concerns.    LNMP 1 week ago.    Denies alcohol consumption since February / March of this year until last night.     REVIEW OF SYSTEMS:  All other systems reviewed and are negative.      Medical History     Past Medical History:   Diagnosis Date     Anxiety      Gastroesophageal reflux disease        No past surgical history on file.    No family history on file.    Social History     Tobacco Use     Smoking status: Never     Smokeless tobacco: Never   Vaping Use     Vaping Use: Former     Start date: 1/1/2020     Quit date: 5/1/2022     Substances: Nicotine, THC, Flavoring     Devices: Disposable, Pre-filled or refillable cartridge   Substance Use Topics     Alcohol use: Not Currently     Drug use: Yes     Frequency: 4.0 times per week     Types: Marijuana       ondansetron (ZOFRAN ODT) 4 MG ODT tab  escitalopram (LEXAPRO) 10 MG tablet  hydrOXYzine (VISTARIL) 25 MG capsule  levonorgestrel (MIRENA) 20 mcg/24 hours (5 yrs) 52 mg IUD  ondansetron (ZOFRAN) 4 MG tablet  pantoprazole (PROTONIX) 40 MG EC tablet  psyllium (METAMUCIL/KONSYL) 58.6 % powder  traZODone (DESYREL) 50 MG tablet        Physical Exam     First Vitals:  Patient Vitals for the past 24 hrs:   BP Temp Temp src Pulse Resp SpO2 Weight   11/26/22 1739 109/61 -- -- 105 -- 100 % --   11/26/22 1720 103/61 -- -- 101 -- 100 % --   11/26/22 1423 117/63 97.7  F (36.5  C) Oral 118 16 99 % 47.6 kg (105 lb)       PHYSICAL EXAM:   Physical Exam    GENERAL: Awake, alert.  In mild acute distress; patient holding emesis bag.   HEENT: Normocephalic, atraumatic. Pupils equal, round and reactive. Conjunctiva normal.    NECK: No stridor.  PULMONARY: Symmetrical breath sounds without distress.  Lungs clear to auscultation bilaterally without wheezes, rhonchi or rales.  CARDIO: Tachycardic rate with regular rhythm. No significant murmur, rub or gallop.  Radial pulses strong and symmetrical.  ABDOMINAL: Abdomen soft, non-distended with mild tenderness to palpation across the upper abdomen; no rebound tenderness or guarding.  No CVAT, BL.  EXTREMITIES: No lower extremity swelling or edema.      NEURO: Alert and oriented to person, place and time.  Cranial nerves grossly intact.  No focal motor deficit.  PSYCH: Normal mood and affect.  SKIN: No rashes.     Results     LAB:  All pertinent labs reviewed and interpreted  Labs Ordered and Resulted from Time of ED Arrival to Time of ED Departure   CBC WITH PLATELETS - Abnormal       Result Value    WBC Count 20.2 (*)     RBC Count 4.00      Hemoglobin 12.2      Hematocrit 37.3      MCV 93      MCH 30.5      MCHC 32.7      RDW 12.5      Platelet Count 366     BASIC METABOLIC PANEL - Abnormal    Sodium 139      Potassium 3.9      Chloride 103      Carbon Dioxide (CO2) 21 (*)     Anion Gap 15      Urea Nitrogen 17.2      Creatinine 0.69      Calcium 9.1      Glucose 97      GFR Estimate >90     HEPATIC FUNCTION PANEL - Abnormal    Protein Total 7.3      Albumin 4.9      Bilirubin Total 0.3      Alkaline Phosphatase 76      AST 61 (*)     ALT 38 (*)     Bilirubin Direct <0.20     LIPASE - Normal    Lipase 22     ETHYL ALCOHOL LEVEL - Normal    Alcohol ethyl <0.01     INR - Normal    INR 1.13     MAGNESIUM - Normal    Magnesium 2.1     HCG QUALITATIVE PREGNANCY - Normal    hCG Serum Qualitative Negative       EC2022, 15:14; sinus tachycardia with rate of 103 bpm; normal intervals; normal conduction; non-specific T wave flattening / inversions (consider U wave) with no ST-T wave changes consistent with ACS or pericarditis; compared to previous EKG dated 8/3/2022, rate has increased by  25 bpm, non-specific T wave changes new    EKG independently reviewed and interpreted by Grisel Jefferson MD      I, Joelinda Hdz, am serving as a scribe to document services personally performed by Grisel Jefferson MD based on my observation and the provider's statements to me. I, Grisel Jefferson MD attest that Joe Hdz is acting in a scribe capacity, has observed my performance of the services and has documented them in accordance with my direction.    Grisel Jefferson MD  Emergency Medicine  [facility] EMERGENCY DEPARTMENT         Grisel Jefferson MD  11/27/22 7841

## 2022-12-20 ENCOUNTER — OFFICE VISIT (OUTPATIENT)
Dept: FAMILY MEDICINE | Facility: CLINIC | Age: 22
End: 2022-12-20
Payer: COMMERCIAL

## 2022-12-20 VITALS
BODY MASS INDEX: 19.57 KG/M2 | SYSTOLIC BLOOD PRESSURE: 110 MMHG | WEIGHT: 114 LBS | HEART RATE: 87 BPM | DIASTOLIC BLOOD PRESSURE: 80 MMHG | OXYGEN SATURATION: 99 % | TEMPERATURE: 97.2 F

## 2022-12-20 DIAGNOSIS — F41.8 SITUATIONAL ANXIETY: Primary | ICD-10-CM

## 2022-12-20 DIAGNOSIS — Z13.9 SCREENING FOR CONDITION: ICD-10-CM

## 2022-12-20 DIAGNOSIS — K21.01 GASTROESOPHAGEAL REFLUX DISEASE WITH ESOPHAGITIS AND HEMORRHAGE: ICD-10-CM

## 2022-12-20 PROCEDURE — 96127 BRIEF EMOTIONAL/BEHAV ASSMT: CPT | Performed by: FAMILY MEDICINE

## 2022-12-20 PROCEDURE — 87491 CHLMYD TRACH DNA AMP PROBE: CPT | Performed by: FAMILY MEDICINE

## 2022-12-20 PROCEDURE — 99214 OFFICE O/P EST MOD 30 MIN: CPT | Mod: 25 | Performed by: FAMILY MEDICINE

## 2022-12-20 PROCEDURE — 90471 IMMUNIZATION ADMIN: CPT | Performed by: FAMILY MEDICINE

## 2022-12-20 PROCEDURE — 90620 MENB-4C VACCINE IM: CPT | Performed by: FAMILY MEDICINE

## 2022-12-20 ASSESSMENT — PATIENT HEALTH QUESTIONNAIRE - PHQ9
10. IF YOU CHECKED OFF ANY PROBLEMS, HOW DIFFICULT HAVE THESE PROBLEMS MADE IT FOR YOU TO DO YOUR WORK, TAKE CARE OF THINGS AT HOME, OR GET ALONG WITH OTHER PEOPLE: SOMEWHAT DIFFICULT
SUM OF ALL RESPONSES TO PHQ QUESTIONS 1-9: 5
SUM OF ALL RESPONSES TO PHQ QUESTIONS 1-9: 5

## 2022-12-20 ASSESSMENT — ANXIETY QUESTIONNAIRES
4. TROUBLE RELAXING: SEVERAL DAYS
GAD7 TOTAL SCORE: 10
GAD7 TOTAL SCORE: 10
IF YOU CHECKED OFF ANY PROBLEMS ON THIS QUESTIONNAIRE, HOW DIFFICULT HAVE THESE PROBLEMS MADE IT FOR YOU TO DO YOUR WORK, TAKE CARE OF THINGS AT HOME, OR GET ALONG WITH OTHER PEOPLE: SOMEWHAT DIFFICULT
1. FEELING NERVOUS, ANXIOUS, OR ON EDGE: SEVERAL DAYS
6. BECOMING EASILY ANNOYED OR IRRITABLE: MORE THAN HALF THE DAYS
3. WORRYING TOO MUCH ABOUT DIFFERENT THINGS: SEVERAL DAYS
8. IF YOU CHECKED OFF ANY PROBLEMS, HOW DIFFICULT HAVE THESE MADE IT FOR YOU TO DO YOUR WORK, TAKE CARE OF THINGS AT HOME, OR GET ALONG WITH OTHER PEOPLE?: SOMEWHAT DIFFICULT
7. FEELING AFRAID AS IF SOMETHING AWFUL MIGHT HAPPEN: MORE THAN HALF THE DAYS
5. BEING SO RESTLESS THAT IT IS HARD TO SIT STILL: SEVERAL DAYS
7. FEELING AFRAID AS IF SOMETHING AWFUL MIGHT HAPPEN: MORE THAN HALF THE DAYS
2. NOT BEING ABLE TO STOP OR CONTROL WORRYING: MORE THAN HALF THE DAYS
GAD7 TOTAL SCORE: 10

## 2022-12-20 NOTE — PROGRESS NOTES
Assessment & Plan     Situational anxiety  Overall improved; hasn't yet started the Lexapro that is on file, but thinking she may yet choose to do this. Declines need for counseling,. Has prn hydroxyzine    Gastroesophageal reflux disease with esophagitis and hemorrhage  Continues pantoprazole 40mg; better weight gain recently. Declines EGD for now. Reviewed ER visit with vomiting after what she reports to be 1 drink. She has hx of irma ro tear. Continue PPI for now but eventually consider decreasing dose.     Screening for condition  - Chlamydia trachomatis PCR; Future        Return in about 1 year (around 12/20/2023) for Annual physical.    Carolyn Latham MD  [facility]    Liliana Cancino is a 22 year old, presenting for the following health issues:  Recheck Medication    Was scheduled for routine mood check today.     Since her last visit she was in the ER on 11/26/2022 for vomiting after alcohol intake- she reports only 1 strong beverage- she started getting anxious about this and this caused her to feel nauseated and throw up.  We have previously reviewed the importance to avoid alcohol cessation given her upper GI bleed secondary to Irma-Ro tear months ago.  EKG showed sinus tachycardia with nonspecific T wave flattening/inversions in but no acute coronary syndrome or pericarditis findings. She was having anxiety about her symptoms. She improved with IV fluids and famotidine, Ativan.    Less stressed now. She did stop her part time job to reduce overwhelm. Optimistic about just focusing on school next semester.   With improvement in her anxiety she found her appetite improved.  She planned her breakfasts out.  Eating more bread/cheese, muffins, and going to add more veggies/fruits too.  She is happy with the weight gain she has achieved since our last visit.   She would like to start working out again- got a gym membership to [employer].     Going back to  school this semester and going well emotionally.     She never really started taking the Lexapro but might want to start it now yet.   Hasn't used the hydroxyzine 25mg in the past 1 month  Not needing trazodone at all for sleep    She does take her pantoprazole 40mg regularly still wakes occasionally with acid in her throat.       Wt Readings from Last 3 Encounters:   12/20/22 51.7 kg (114 lb)   11/26/22 47.6 kg (105 lb)   09/19/22 46.1 kg (101 lb 9.6 oz) (5 %, Z= -1.68)*     * Growth percentiles are based on [facility] (Girls, 2-20 Years) data.       History of Present Illness       Mental Health Follow-up:  Patient presents to follow-up on Depression & Anxiety.Patient's depression since last visit has been:  Medium  The patient is not having other symptoms associated with depression.  Patient's anxiety since last visit has been:  Medium  The patient is not having other symptoms associated with anxiety.  Any significant life events: No  Patient is not feeling anxious or having panic attacks.  Patient has no concerns about alcohol or drug use.    She eats 0-1 servings of fruits and vegetables daily.She consumes 0 sweetened beverage(s) daily.She exercises with enough effort to increase her heart rate 9 or less minutes per day.  She exercises with enough effort to increase her heart rate 3 or less days per week.   She is taking medications regularly.    Today's PHQ-9         PHQ-9 Total Score: 5    PHQ-9 Q9 Thoughts of better off dead/self-harm past 2 weeks :   Not at all    How difficult have these problems made it for you to do your work, take care of things at home, or get along with other people: Somewhat difficult  Today's MELANY-7 Score: 10             Review of Systems         Objective    /80 (BP Location: Left arm, Patient Position: Sitting, Cuff Size: Adult Regular)   Pulse 87   Temp 97.2  F (36.2  C) (Temporal)   Wt 51.7 kg (114 lb)   SpO2 99%   BMI 19.57 kg/m    Body mass index is 19.57 kg/m .  Physical  Exam   GENERAL: healthy, alert and no distress  NECK: no adenopathy, no asymmetry, masses, or scars and thyroid normal to palpation  RESP: lungs clear to auscultation - no rales, rhonchi or wheezes  CV: regular rate and rhythm, normal S1 S2, no S3 or S4, no murmur, click or rub, no peripheral edema and peripheral pulses strong  ABDOMEN: soft, nontender, no hepatosplenomegaly, no masses and bowel sounds normal  MS: no gross musculoskeletal defects noted, no edema

## 2022-12-21 LAB — C TRACH DNA SPEC QL NAA+PROBE: NEGATIVE

## 2023-01-05 DIAGNOSIS — K21.01 GASTROESOPHAGEAL REFLUX DISEASE WITH ESOPHAGITIS AND HEMORRHAGE: ICD-10-CM

## 2023-01-05 DIAGNOSIS — K22.6 MALLORY-WEISS TEAR: ICD-10-CM

## 2023-01-05 RX ORDER — PANTOPRAZOLE SODIUM 40 MG/1
TABLET, DELAYED RELEASE ORAL
Qty: 90 TABLET | Refills: 1 | Status: SHIPPED | OUTPATIENT
Start: 2023-01-05 | End: 2023-08-15

## 2023-01-05 NOTE — TELEPHONE ENCOUNTER
"Last Written Prescription Date:  6/6/22  Last Fill Quantity: 90,  # refills: 1   Last office visit provider:   12/20/22    Requested Prescriptions   Pending Prescriptions Disp Refills     pantoprazole (PROTONIX) 40 MG EC tablet [Pharmacy Med Name: PANTOPRAZOLE SOD DR 40 MG TAB] 90 tablet 1     Sig: TAKE 1 TABLET BY MOUTH EVERY DAY       PPI Protocol Passed - 1/5/2023 12:28 AM        Passed - Not on Clopidogrel (unless Pantoprazole ordered)        Passed - No diagnosis of osteoporosis on record        Passed - Recent (12 mo) or future (30 days) visit within the authorizing provider's specialty     Patient has had an office visit with the authorizing provider or a provider within the authorizing providers department within the previous 12 mos or has a future within next 30 days. See \"Patient Info\" tab in inbasket, or \"Choose Columns\" in Meds & Orders section of the refill encounter.              Passed - Medication is active on med list        Passed - Patient is age 18 or older        Passed - No active pregnacy on record        Passed - No positive pregnancy test in past 12 months             Erma Allen RN 01/05/23 2:13 PM  "

## 2023-03-15 ENCOUNTER — TELEPHONE (OUTPATIENT)
Dept: FAMILY MEDICINE | Facility: CLINIC | Age: 23
End: 2023-03-15
Payer: COMMERCIAL

## 2023-03-15 DIAGNOSIS — F41.8 SITUATIONAL ANXIETY: Primary | ICD-10-CM

## 2023-03-15 NOTE — TELEPHONE ENCOUNTER
03/15/23  Order/Referral Request    Who is requesting: Pt    Orders being requested: Psychiatry/ psychology therapy referral    Reason service is needed/diagnosis: Pt states her old therapist is on leave and pt has been struggling to find a new one, looking for suggestions    When are orders needed by: asap    Has this been discussed with Provider: Yes    Does patient have a preference on a Group/Provider/Facility? Anywhere is ok    Does patient have an appointment scheduled?: No    Where to send orders:  and Place orders within Epic    Could we send this information to you in "[employer] or would you prefer to receive a phone call?:   Patient would prefer a phone call   Okay to leave a detailed message?: Yes at Cell number on file:    Telephone Information:   Mobile [phone]

## 2023-03-15 NOTE — TELEPHONE ENCOUNTER
I've placed order for counseling/therapy referral; please encourage to schedule annual physical or OV sooner to discuss concerns if desired

## 2023-03-29 ENCOUNTER — HOSPITAL ENCOUNTER (EMERGENCY)
Facility: HOSPITAL | Age: 23
Discharge: HOME | End: 2023-03-29
Attending: FAMILY MEDICINE | Admitting: FAMILY MEDICINE
Payer: COMMERCIAL

## 2023-03-29 ENCOUNTER — APPOINTMENT (OUTPATIENT)
Dept: RADIOLOGY | Facility: HOSPITAL | Age: 23
End: 2023-03-29
Attending: FAMILY MEDICINE
Payer: COMMERCIAL

## 2023-03-29 VITALS
HEART RATE: 73 BPM | BODY MASS INDEX: 17.93 KG/M2 | SYSTOLIC BLOOD PRESSURE: 122 MMHG | RESPIRATION RATE: 16 BRPM | WEIGHT: 105 LBS | HEIGHT: 64 IN | OXYGEN SATURATION: 97 % | DIASTOLIC BLOOD PRESSURE: 75 MMHG | TEMPERATURE: 99.4 F

## 2023-03-29 DIAGNOSIS — R11.2 NAUSEA AND VOMITING, UNSPECIFIED VOMITING TYPE: ICD-10-CM

## 2023-03-29 LAB
ALBUMIN SERPL BCG-MCNC: 4.9 G/DL (ref 3.5–5.2)
ALP SERPL-CCNC: 82 U/L (ref 35–104)
ALT SERPL W P-5'-P-CCNC: 11 U/L (ref 10–35)
ANION GAP SERPL CALCULATED.3IONS-SCNC: 16 MMOL/L (ref 7–15)
AST SERPL W P-5'-P-CCNC: 25 U/L (ref 10–35)
BASOPHILS # BLD AUTO: 0.1 10E3/UL (ref 0–0.2)
BASOPHILS NFR BLD AUTO: 0 %
BILIRUB DIRECT SERPL-MCNC: <0.2 MG/DL (ref 0–0.3)
BILIRUB SERPL-MCNC: 0.5 MG/DL
BUN SERPL-MCNC: 9 MG/DL (ref 6–20)
CALCIUM SERPL-MCNC: 9.5 MG/DL (ref 8.6–10)
CHLORIDE SERPL-SCNC: 104 MMOL/L (ref 98–107)
CREAT SERPL-MCNC: 0.64 MG/DL (ref 0.51–0.95)
DEPRECATED HCO3 PLAS-SCNC: 22 MMOL/L (ref 22–29)
EOSINOPHIL # BLD AUTO: 0 10E3/UL (ref 0–0.7)
EOSINOPHIL NFR BLD AUTO: 0 %
ERYTHROCYTE [DISTWIDTH] IN BLOOD BY AUTOMATED COUNT: 13.3 % (ref 10–15)
GFR SERPL CREATININE-BSD FRML MDRD: >90 ML/MIN/1.73M2
GLUCOSE SERPL-MCNC: 85 MG/DL (ref 70–99)
HCT VFR BLD AUTO: 42.3 % (ref 35–47)
HGB BLD-MCNC: 13.9 G/DL (ref 11.7–15.7)
IMM GRANULOCYTES # BLD: 0.1 10E3/UL
IMM GRANULOCYTES NFR BLD: 0 %
LIPASE SERPL-CCNC: 27 U/L (ref 13–60)
LYMPHOCYTES # BLD AUTO: 1.8 10E3/UL (ref 0.8–5.3)
LYMPHOCYTES NFR BLD AUTO: 11 %
MAGNESIUM SERPL-MCNC: 2 MG/DL (ref 1.7–2.3)
MCH RBC QN AUTO: 30.2 PG (ref 26.5–33)
MCHC RBC AUTO-ENTMCNC: 32.9 G/DL (ref 31.5–36.5)
MCV RBC AUTO: 92 FL (ref 78–100)
MONOCYTES # BLD AUTO: 0.6 10E3/UL (ref 0–1.3)
MONOCYTES NFR BLD AUTO: 4 %
NEUTROPHILS # BLD AUTO: 14.1 10E3/UL (ref 1.6–8.3)
NEUTROPHILS NFR BLD AUTO: 85 %
NRBC # BLD AUTO: 0 10E3/UL
NRBC BLD AUTO-RTO: 0 /100
PLATELET # BLD AUTO: 316 10E3/UL (ref 150–450)
POTASSIUM SERPL-SCNC: 4.1 MMOL/L (ref 3.4–5.3)
PROT SERPL-MCNC: 7.7 G/DL (ref 6.4–8.3)
RBC # BLD AUTO: 4.61 10E6/UL (ref 3.8–5.2)
SODIUM SERPL-SCNC: 142 MMOL/L (ref 136–145)
TSH SERPL DL<=0.005 MIU/L-ACNC: 0.82 UIU/ML (ref 0.3–4.2)
WBC # BLD AUTO: 16.5 10E3/UL (ref 4–11)

## 2023-03-29 PROCEDURE — 99284 EMERGENCY DEPT VISIT MOD MDM: CPT | Mod: 25

## 2023-03-29 PROCEDURE — 250N000011 HC RX IP 250 OP 636: Performed by: FAMILY MEDICINE

## 2023-03-29 PROCEDURE — 36415 COLL VENOUS BLD VENIPUNCTURE: CPT | Performed by: FAMILY MEDICINE

## 2023-03-29 PROCEDURE — 82248 BILIRUBIN DIRECT: CPT | Performed by: FAMILY MEDICINE

## 2023-03-29 PROCEDURE — 83735 ASSAY OF MAGNESIUM: CPT | Performed by: FAMILY MEDICINE

## 2023-03-29 PROCEDURE — 85025 COMPLETE CBC W/AUTO DIFF WBC: CPT | Performed by: FAMILY MEDICINE

## 2023-03-29 PROCEDURE — 84443 ASSAY THYROID STIM HORMONE: CPT | Performed by: FAMILY MEDICINE

## 2023-03-29 PROCEDURE — 96374 THER/PROPH/DIAG INJ IV PUSH: CPT

## 2023-03-29 PROCEDURE — 83690 ASSAY OF LIPASE: CPT | Performed by: FAMILY MEDICINE

## 2023-03-29 PROCEDURE — 74019 RADEX ABDOMEN 2 VIEWS: CPT

## 2023-03-29 PROCEDURE — 80053 COMPREHEN METABOLIC PANEL: CPT | Performed by: FAMILY MEDICINE

## 2023-03-29 PROCEDURE — 96361 HYDRATE IV INFUSION ADD-ON: CPT

## 2023-03-29 PROCEDURE — 258N000003 HC RX IP 258 OP 636: Performed by: FAMILY MEDICINE

## 2023-03-29 RX ORDER — METOCLOPRAMIDE 10 MG/1
10 TABLET ORAL 3 TIMES DAILY PRN
Qty: 20 TABLET | Refills: 0 | Status: SHIPPED | OUTPATIENT
Start: 2023-03-29 | End: 2023-06-13

## 2023-03-29 RX ORDER — ONDANSETRON 2 MG/ML
4 INJECTION INTRAMUSCULAR; INTRAVENOUS ONCE
Status: COMPLETED | OUTPATIENT
Start: 2023-03-29 | End: 2023-03-29

## 2023-03-29 RX ADMIN — SODIUM CHLORIDE 1000 ML: 9 INJECTION, SOLUTION INTRAVENOUS at 12:17

## 2023-03-29 RX ADMIN — ONDANSETRON 4 MG: 2 INJECTION INTRAMUSCULAR; INTRAVENOUS at 12:17

## 2023-03-29 ASSESSMENT — ENCOUNTER SYMPTOMS
FEVER: 0
VOMITING: 1
CONSTIPATION: 1
ABDOMINAL PAIN: 1
APPETITE CHANGE: 1
NAUSEA: 1
ABDOMINAL DISTENTION: 1

## 2023-03-29 ASSESSMENT — ACTIVITIES OF DAILY LIVING (ADL): ADLS_ACUITY_SCORE: 33

## 2023-03-29 NOTE — ED PROVIDER NOTES
EMERGENCY DEPARTMENT ENCOUNTER      NAME: Julita Fernandez  AGE: 22 year old female  YOB: 2000  MRN: [id number]  EVALUATION DATE & TIME: No admission date for patient encounter.    PCP: Carolyn Latham    ED PROVIDER: Severino Monae M.D.    Chief Complaint   Patient presents with     Vomiting       FINAL IMPRESSION:  1. Nausea and vomiting, unspecified vomiting type        ED COURSE & MEDICAL DECISION MAKING:    Pertinent Labs & Imaging studies independently interpreted by me. (See chart for details)  11:18 AM Patient seen and examined, review of prior emergency department visit from November 2022 shows history of nausea vomiting upper abdominal pain, at that time related to alcoholic gastritis.  Patient presents today with upper abdominal pain, nausea, and decreased stool output.  Decreased oral intake.  No sensation of rectal fullness, has been taking Metamucil but no other medications for this.  On exam, patient is thin, mild upper abdominal tenderness, mild epigastric tenderness but no lower abdominal tenderness.  No distention.  Labs and x-ray are ordered initially to evaluate for obstruction or large volume stool.  Fluids and Zofran are ordered.  11:54 AM x-ray independently interpreted by me demonstrates IUD in the pelvis, small volume of stool in the ascending colon and small amount of stool distally but no evidence for fecal impaction or large stool volume.  2:02 PM labs independently interpreted by me demonstrate leukocytosis which has been seen in the past when patient presents with gastrointestinal symptoms.  Slight elevation in the anion gap with no acidosis, hepatic function panel reassuring, lipase is normal, thyroid test is normal.  Patient is stable for discharge, discussed liquid diet for 24 hours, would not attempt further treatment for constipation until she is eating more normally.  Patient has Zofran at home, will be started on Reglan and can be discharged    At the  conclusion of the encounter I discussed the results of all of the tests and the disposition. The questions were answered. The patient or family acknowledged understanding and was agreeable with the care plan.     Medical Decision Making    History:    Supplemental history from: Documented in chart, if applicable    External Record(s) reviewed: Documented in chart, if applicable.    Work Up:    Chart documentation includes differential considered and any EKGs or imaging independently interpreted by provider, where specified.    In additional to work up documented, I considered the following work up: Documented in chart, if applicable.    External consultation:    Discussion of management with another provider: Documented in chart, if applicable    Complicating factors:    Care impacted by chronic illness: Mental Health    Care affected by social determinants of health: Alcohol Abuse and/or Recreational Drug Use    Disposition considerations: Discharge. I prescribed additional prescription strength medication(s) as charted. I considered admission, but discharged patient after significant clinical improvement.        PROCEDURES:       MEDICATIONS GIVEN IN THE EMERGENCY:  Medications   0.9% sodium chloride BOLUS (1,000 mLs Intravenous $New Bag 3/29/23 1217)   ondansetron (ZOFRAN) injection 4 mg (4 mg Intravenous $Given 3/29/23 1217)       NEW PRESCRIPTIONS STARTED AT TODAY'S ER VISIT  New Prescriptions    METOCLOPRAMIDE (REGLAN) 10 MG TABLET    Take 1 tablet (10 mg) by mouth 3 times daily as needed (nausea and vomiting)       =================================================================    HPI    Patient information was obtained from: the patient      Julita Fernandez is a 22 year old female with a pertinent history of GERD, upper GI bleed, and MELANY, who presents to this ED for evaluation of vomiting and constipation.    The patient reports that she thinks she got food poisoning three days ago. During that time she  was vomiting and nauseated. Since then, she has not had a bowel movement. She states that she feels as though she needs to, but whenever she tries she is unable to stool. She endorses a loss of appetite and abdominal bloating as well. In order to alleviate her symptoms, she has taken metamucil without relief. She also tried anti-nausea medication, but immediately threw it back up. She is also experiencing some upper abdominal pain which she accredits to excessive vomiting. The only daily medication she takes is pantoprazole for acid reflux. Patient denies chance of pregnancy, as she has an IUD. Patient smokes marijuana and drinks alcohol on occasion, but denies any recent use. The patient denies fever and any other complaints at this time.      REVIEW OF SYSTEMS   Review of Systems   Constitutional: Positive for appetite change. Negative for fever.   Gastrointestinal: Positive for abdominal distention, abdominal pain (secondary to vomiting), constipation, nausea and vomiting.   All other systems reviewed and are negative.     All other systems reviewed and negative    PAST MEDICAL HISTORY:  Past Medical History:   Diagnosis Date     Anxiety      Gastroesophageal reflux disease        PAST SURGICAL HISTORY:  No past surgical history on file.    CURRENT MEDICATIONS:    No current facility-administered medications for this encounter.     Current Outpatient Medications   Medication     metoclopramide (REGLAN) 10 MG tablet     escitalopram (LEXAPRO) 10 MG tablet     hydrOXYzine (VISTARIL) 25 MG capsule     levonorgestrel (MIRENA) 20 mcg/24 hours (5 yrs) 52 mg IUD     ondansetron (ZOFRAN ODT) 4 MG ODT tab     ondansetron (ZOFRAN) 4 MG tablet     pantoprazole (PROTONIX) 40 MG EC tablet     psyllium (METAMUCIL/KONSYL) 58.6 % powder       ALLERGIES:  Allergies   Allergen Reactions     Soy [Isoflavones] Other (See Comments)     Facial eczema        FAMILY HISTORY:  No family history on file.    SOCIAL HISTORY:   Social  "History     Socioeconomic History     Marital status: Single     Number of children: 0     Years of education: 13     Highest education level: High school graduate   Occupational History     Occupation: Student   Tobacco Use     Smoking status: Never     Smokeless tobacco: Never   Vaping Use     Vaping Use: Former     Start date: 1/1/2020     Quit date: 5/1/2022     Substances: Nicotine, THC, Flavoring     Devices: Disposable, Pre-filled or refillable cartridge   Substance and Sexual Activity     Alcohol use: Not Currently     Drug use: Yes     Frequency: 4.0 times per week     Types: Marijuana     Sexual activity: Yes     Partners: Male     Birth control/protection: I.U.D.   Social History Narrative    [employer] [employer] in [place]. Taking a break now to save money and find her career path.    Living with her parents- Tian (cardiologist) & Marija    Stopped smoking weed when she went to college.    Nicotine in high school, otherwise not vaping or smoking    Works at [employer]       VITALS:  /86   Pulse 98   Temp 99.4  F (37.4  C) (Oral)   Resp 16   Ht 1.626 m (5' 4\")   Wt 47.6 kg (105 lb)   SpO2 96%   BMI 18.02 kg/m      PHYSICAL EXAM:  Physical Exam  Vitals and nursing note reviewed.   Constitutional:       Appearance: Normal appearance.   HENT:      Head: Normocephalic and atraumatic.      Right Ear: External ear normal.      Left Ear: External ear normal.      Nose: Nose normal.      Mouth/Throat:      Mouth: Mucous membranes are moist.   Eyes:      Extraocular Movements: Extraocular movements intact.      Conjunctiva/sclera: Conjunctivae normal.      Pupils: Pupils are equal, round, and reactive to light.   Cardiovascular:      Rate and Rhythm: Normal rate and regular rhythm.   Pulmonary:      Effort: Pulmonary effort is normal.      Breath sounds: Normal breath sounds. No wheezing or rales.   Abdominal:      General: Abdomen is flat. There is no distension.      Palpations: Abdomen is " soft.      Tenderness: There is no abdominal tenderness. There is no guarding.   Musculoskeletal:         General: Normal range of motion.      Cervical back: Normal range of motion and neck supple.      Right lower leg: No edema.      Left lower leg: No edema.   Lymphadenopathy:      Cervical: No cervical adenopathy.   Skin:     General: Skin is warm and dry.   Neurological:      General: No focal deficit present.      Mental Status: She is alert and oriented to person, place, and time. Mental status is at baseline.      Comments: No gross focal neurologic deficits   Psychiatric:         Mood and Affect: Mood normal.         Behavior: Behavior normal.         Thought Content: Thought content normal.          LAB:  All pertinent labs reviewed and interpreted.  Results for orders placed or performed during the hospital encounter of 03/29/23   Abdomen XR, 2 vw, flat and upright    Impression    IMPRESSION: No bowel obstruction or free intraperitoneal air. There is a moderate amount of fecal material in the colon. No radiopaque kidney stone or gallstone. IUD projects over the pelvis in the expected location of the uterus.   Basic metabolic panel   Result Value Ref Range    Sodium 142 136 - 145 mmol/L    Potassium 4.1 3.4 - 5.3 mmol/L    Chloride 104 98 - 107 mmol/L    Carbon Dioxide (CO2) 22 22 - 29 mmol/L    Anion Gap 16 (H) 7 - 15 mmol/L    Urea Nitrogen 9.0 6.0 - 20.0 mg/dL    Creatinine 0.64 0.51 - 0.95 mg/dL    Calcium 9.5 8.6 - 10.0 mg/dL    Glucose 85 70 - 99 mg/dL    GFR Estimate >90 >60 mL/min/1.73m2   Result Value Ref Range    Lipase 27 13 - 60 U/L   Result Value Ref Range    Magnesium 2.0 1.7 - 2.3 mg/dL   Hepatic function panel   Result Value Ref Range    Protein Total 7.7 6.4 - 8.3 g/dL    Albumin 4.9 3.5 - 5.2 g/dL    Bilirubin Total 0.5 <=1.2 mg/dL    Alkaline Phosphatase 82 35 - 104 U/L    AST 25 10 - 35 U/L    ALT 11 10 - 35 U/L    Bilirubin Direct <0.20 0.00 - 0.30 mg/dL   TSH with free T4 reflex    Result Value Ref Range    TSH 0.82 0.30 - 4.20 uIU/mL   CBC with platelets and differential   Result Value Ref Range    WBC Count 16.5 (H) 4.0 - 11.0 10e3/uL    RBC Count 4.61 3.80 - 5.20 10e6/uL    Hemoglobin 13.9 11.7 - 15.7 g/dL    Hematocrit 42.3 35.0 - 47.0 %    MCV 92 78 - 100 fL    MCH 30.2 26.5 - 33.0 pg    MCHC 32.9 31.5 - 36.5 g/dL    RDW 13.3 10.0 - 15.0 %    Platelet Count 316 150 - 450 10e3/uL    % Neutrophils 85 %    % Lymphocytes 11 %    % Monocytes 4 %    % Eosinophils 0 %    % Basophils 0 %    % Immature Granulocytes 0 %    NRBCs per 100 WBC 0 <1 /100    Absolute Neutrophils 14.1 (H) 1.6 - 8.3 10e3/uL    Absolute Lymphocytes 1.8 0.8 - 5.3 10e3/uL    Absolute Monocytes 0.6 0.0 - 1.3 10e3/uL    Absolute Eosinophils 0.0 0.0 - 0.7 10e3/uL    Absolute Basophils 0.1 0.0 - 0.2 10e3/uL    Absolute Immature Granulocytes 0.1 <=0.4 10e3/uL    Absolute NRBCs 0.0 10e3/uL       RADIOLOGY:  Reviewed all pertinent imaging. Please see official radiology report.  Abdomen XR, 2 vw, flat and upright   Final Result   IMPRESSION: No bowel obstruction or free intraperitoneal air. There is a moderate amount of fecal material in the colon. No radiopaque kidney stone or gallstone. IUD projects over the pelvis in the expected location of the uterus.        I, Aga Austin, am serving as a scribe to document services personally performed by Dr. Monae based on my observation and the provider's statements to me. I, Severino Monae MD attest that Aga Austin is acting in a scribe capacity, has observed my performance of the services and has documented them in accordance with my direction.    Severino Monae M.D.  Emergency Medicine  [facility] EMERGENCY DEPARTMENT  [address] [place] [id number] [phone]  Dept: [phone]       Severino Monae MD  03/29/23 3279

## 2023-03-30 ENCOUNTER — PATIENT OUTREACH (OUTPATIENT)
Dept: FAMILY MEDICINE | Facility: CLINIC | Age: 23
End: 2023-03-30
Payer: COMMERCIAL

## 2023-03-30 ENCOUNTER — NURSE TRIAGE (OUTPATIENT)
Dept: FAMILY MEDICINE | Facility: CLINIC | Age: 23
End: 2023-03-30
Payer: COMMERCIAL

## 2023-03-30 ENCOUNTER — APPOINTMENT (OUTPATIENT)
Dept: CT IMAGING | Facility: HOSPITAL | Age: 23
End: 2023-03-30
Attending: EMERGENCY MEDICINE
Payer: COMMERCIAL

## 2023-03-30 ENCOUNTER — NURSE TRIAGE (OUTPATIENT)
Dept: NURSING | Facility: CLINIC | Age: 23
End: 2023-03-30
Payer: COMMERCIAL

## 2023-03-30 ENCOUNTER — HOSPITAL ENCOUNTER (EMERGENCY)
Facility: HOSPITAL | Age: 23
Discharge: HOME | End: 2023-03-31
Attending: EMERGENCY MEDICINE | Admitting: EMERGENCY MEDICINE
Payer: COMMERCIAL

## 2023-03-30 VITALS
OXYGEN SATURATION: 97 % | DIASTOLIC BLOOD PRESSURE: 91 MMHG | SYSTOLIC BLOOD PRESSURE: 130 MMHG | RESPIRATION RATE: 16 BRPM | WEIGHT: 109.6 LBS | BODY MASS INDEX: 18.81 KG/M2 | HEART RATE: 96 BPM | TEMPERATURE: 99.3 F

## 2023-03-30 DIAGNOSIS — K62.5 RECTAL BLEEDING: ICD-10-CM

## 2023-03-30 LAB
ALBUMIN SERPL BCG-MCNC: 4.9 G/DL (ref 3.5–5.2)
ALP SERPL-CCNC: 86 U/L (ref 35–104)
ALT SERPL W P-5'-P-CCNC: 11 U/L (ref 10–35)
ANION GAP SERPL CALCULATED.3IONS-SCNC: 14 MMOL/L (ref 7–15)
APTT PPP: 28 SECONDS (ref 22–38)
AST SERPL W P-5'-P-CCNC: 21 U/L (ref 10–35)
BASOPHILS # BLD AUTO: 0 10E3/UL (ref 0–0.2)
BASOPHILS NFR BLD AUTO: 1 %
BILIRUB SERPL-MCNC: 0.4 MG/DL
BUN SERPL-MCNC: 5.5 MG/DL (ref 6–20)
CALCIUM SERPL-MCNC: 9.6 MG/DL (ref 8.6–10)
CHLORIDE SERPL-SCNC: 105 MMOL/L (ref 98–107)
CREAT SERPL-MCNC: 0.65 MG/DL (ref 0.51–0.95)
DEPRECATED HCO3 PLAS-SCNC: 24 MMOL/L (ref 22–29)
EOSINOPHIL # BLD AUTO: 0.1 10E3/UL (ref 0–0.7)
EOSINOPHIL NFR BLD AUTO: 1 %
ERYTHROCYTE [DISTWIDTH] IN BLOOD BY AUTOMATED COUNT: 13 % (ref 10–15)
GFR SERPL CREATININE-BSD FRML MDRD: >90 ML/MIN/1.73M2
GLUCOSE SERPL-MCNC: 111 MG/DL (ref 70–99)
HCG SERPL QL: NEGATIVE
HCT VFR BLD AUTO: 40.3 % (ref 35–47)
HGB BLD-MCNC: 13.5 G/DL (ref 11.7–15.7)
IMM GRANULOCYTES # BLD: 0 10E3/UL
IMM GRANULOCYTES NFR BLD: 0 %
INR PPP: 1.07 (ref 0.85–1.15)
LIPASE SERPL-CCNC: 29 U/L (ref 13–60)
LYMPHOCYTES # BLD AUTO: 3.2 10E3/UL (ref 0.8–5.3)
LYMPHOCYTES NFR BLD AUTO: 38 %
MCH RBC QN AUTO: 30.5 PG (ref 26.5–33)
MCHC RBC AUTO-ENTMCNC: 33.5 G/DL (ref 31.5–36.5)
MCV RBC AUTO: 91 FL (ref 78–100)
MONOCYTES # BLD AUTO: 0.8 10E3/UL (ref 0–1.3)
MONOCYTES NFR BLD AUTO: 10 %
NEUTROPHILS # BLD AUTO: 4.4 10E3/UL (ref 1.6–8.3)
NEUTROPHILS NFR BLD AUTO: 50 %
NRBC # BLD AUTO: 0 10E3/UL
NRBC BLD AUTO-RTO: 0 /100
PLATELET # BLD AUTO: 307 10E3/UL (ref 150–450)
POTASSIUM SERPL-SCNC: 4 MMOL/L (ref 3.4–5.3)
PROT SERPL-MCNC: 7.5 G/DL (ref 6.4–8.3)
RBC # BLD AUTO: 4.43 10E6/UL (ref 3.8–5.2)
SODIUM SERPL-SCNC: 143 MMOL/L (ref 136–145)
WBC # BLD AUTO: 8.5 10E3/UL (ref 4–11)

## 2023-03-30 PROCEDURE — 36415 COLL VENOUS BLD VENIPUNCTURE: CPT | Performed by: EMERGENCY MEDICINE

## 2023-03-30 PROCEDURE — 99285 EMERGENCY DEPT VISIT HI MDM: CPT | Mod: 25

## 2023-03-30 PROCEDURE — 96361 HYDRATE IV INFUSION ADD-ON: CPT

## 2023-03-30 PROCEDURE — 85025 COMPLETE CBC W/AUTO DIFF WBC: CPT | Performed by: EMERGENCY MEDICINE

## 2023-03-30 PROCEDURE — 258N000003 HC RX IP 258 OP 636: Performed by: EMERGENCY MEDICINE

## 2023-03-30 PROCEDURE — 85730 THROMBOPLASTIN TIME PARTIAL: CPT | Performed by: EMERGENCY MEDICINE

## 2023-03-30 PROCEDURE — 83690 ASSAY OF LIPASE: CPT | Performed by: EMERGENCY MEDICINE

## 2023-03-30 PROCEDURE — 250N000011 HC RX IP 250 OP 636: Performed by: EMERGENCY MEDICINE

## 2023-03-30 PROCEDURE — 80053 COMPREHEN METABOLIC PANEL: CPT | Performed by: EMERGENCY MEDICINE

## 2023-03-30 PROCEDURE — 85610 PROTHROMBIN TIME: CPT | Performed by: EMERGENCY MEDICINE

## 2023-03-30 PROCEDURE — 74174 CTA ABD&PLVS W/CONTRAST: CPT

## 2023-03-30 PROCEDURE — 96374 THER/PROPH/DIAG INJ IV PUSH: CPT

## 2023-03-30 PROCEDURE — 84703 CHORIONIC GONADOTROPIN ASSAY: CPT | Performed by: EMERGENCY MEDICINE

## 2023-03-30 RX ORDER — ONDANSETRON 2 MG/ML
4 INJECTION INTRAMUSCULAR; INTRAVENOUS ONCE
Status: COMPLETED | OUTPATIENT
Start: 2023-03-30 | End: 2023-03-30

## 2023-03-30 RX ORDER — IOPAMIDOL 755 MG/ML
100 INJECTION, SOLUTION INTRAVASCULAR ONCE
Status: COMPLETED | OUTPATIENT
Start: 2023-03-30 | End: 2023-03-30

## 2023-03-30 RX ADMIN — IOPAMIDOL 100 ML: 755 INJECTION, SOLUTION INTRAVENOUS at 21:25

## 2023-03-30 RX ADMIN — ONDANSETRON 4 MG: 2 INJECTION INTRAMUSCULAR; INTRAVENOUS at 22:09

## 2023-03-30 RX ADMIN — SODIUM CHLORIDE 1000 ML: 9 INJECTION, SOLUTION INTRAVENOUS at 19:57

## 2023-03-30 ASSESSMENT — ENCOUNTER SYMPTOMS
VOMITING: 1
BLOOD IN STOOL: 1
RECTAL PAIN: 1

## 2023-03-30 ASSESSMENT — ACTIVITIES OF DAILY LIVING (ADL)
ADLS_ACUITY_SCORE: 35
ADLS_ACUITY_SCORE: 35
ADLS_ACUITY_SCORE: 33

## 2023-03-30 NOTE — ED PROVIDER NOTES
EMERGENCY DEPARTMENT ENCOUNTER      NAME: Julita Fernandez  AGE: 22 year old female  YOB: 2000  MRN: [id number]  EVALUATION DATE & TIME: No admission date for patient encounter.    PCP: Carolyn Latham    ED PROVIDER: Mike Aponte M.D.      Chief Complaint   Patient presents with     Rectal Bleeding     Nausea         FINAL IMPRESSION:  1.  Acute rectal bleeding.      ED COURSE & MEDICAL DECISION MAKIN:34 PM I met with the patient to gather history and to perform my initial exam. We discussed plans for the ED course, including diagnostic testing and treatment. PPE worn: cloth mask.  Patient seen yesterday for nausea and vomiting and had been constipated for 3 days.  Today she passed gray stool with bright red blood per rectum.  Then she passed some clots.  She notes minimal lightheadedness.  9:55 PM.  Pregnancy test negative.  Chemistries normal.  LFTs and lipase normal.  Coagulation functions normal.  CBC normal.  CAT scan showing no evidence for active GI bleeding at this time.  There is slight thickening of the sigmoid wall consistent with incomplete distention versus possible colitis.  However patient has no abdominal pain, no elevated white count, no fever and no history so this is less likely.  Patient feeling better after IV fluids and Zofran patient will be discharged home.  We did talk about diet to increase fiber, bran, fruits and vegetables, and liquids to help prevent constipation.  We she is instructed to follow-up with her family doctor or clinic within the week.  Patient in agreement with the plan.    Pertinent Labs & Imaging studies reviewed. (See chart for details)    22 year old female presents to the Emergency Department for evaluation of recta bleeding and nausea.    At the conclusion of the encounter I discussed the results of all of the tests and the disposition. The questions were answered. The patient or family acknowledged understanding and was agreeable with  "the care plan.     Medical Decision Making    History:    Supplemental history from: Documented in chart, if applicable    External Record(s) reviewed: Inpatient and outpatient computer records reviewed.    Work Up:    Chart documentation includes differential considered and any EKGs or imaging independently interpreted by provider, where specified.  Differential diagnosis includes hematochezia, melena, hemorrhoidal bleeding secondary to constipation, etc.    In additional to work up documented, I considered the following work up: Documented in chart, if applicable.    External consultation:    Discussion of management with another provider: Documented in chart, if applicable    Complicating factors:    Care impacted by chronic illness: Other: GERD    Care affected by social determinants of health: N/A    Disposition considerations: Anticipate discharge home if negative work-up.            MEDICATIONS GIVEN IN THE EMERGENCY:  Medications   0.9% sodium chloride BOLUS (has no administration in time range)   ondansetron (ZOFRAN) injection 4 mg (has no administration in time range)       NEW PRESCRIPTIONS STARTED AT TODAY'S ER VISIT  New Prescriptions    No medications on file          =================================================================    HPI    Patient information was obtained from: Patient    Use of : N/A      Julita Fernandez is a 22 year old female with a pertinent history of GERD, hypoglycemia, UGIB, IUD, and anxiety, who presents to this ED by private car for evaluation of rectal bleeding and nausea.    Patient reports an onset of constipation which occured yesterday. She describes she felt \"icky\" and started feeing anxious about her symptoms. She endorses vomiting, which hasn't stopped. Patient reports she was attempting to take chewable tablets. However, she notes vomiting afterwards as well. She states she saw \"grey\" colored stools which encouraged her to call her nurse today. However, " "she notes her nurse wasn't too concerned. Patient reports she saw \"larger clots of blood\" after she called her nurse. She also endorses rectal pain.    Patient reports she currently isn't pregnant or uses tobacco.    Patient denies tar stools.     She does not identify any waxing or waning symptoms otherwise, exacerbating or alleviating features,associated symptoms except as mentioned. She denies any pain related complaints.    REVIEW OF SYSTEMS   Review of Systems   Gastrointestinal: Positive for blood in stool (\"clots\"), rectal pain and vomiting (constant).        Negative for tarry stools   All other systems reviewed and are negative.       PAST MEDICAL HISTORY:  Past Medical History:   Diagnosis Date     Anxiety      Gastroesophageal reflux disease        PAST SURGICAL HISTORY:  History reviewed. No pertinent surgical history.        CURRENT MEDICATIONS:    escitalopram (LEXAPRO) 10 MG tablet  hydrOXYzine (VISTARIL) 25 MG capsule  levonorgestrel (MIRENA) 20 mcg/24 hours (5 yrs) 52 mg IUD  metoclopramide (REGLAN) 10 MG tablet  ondansetron (ZOFRAN ODT) 4 MG ODT tab  ondansetron (ZOFRAN) 4 MG tablet  pantoprazole (PROTONIX) 40 MG EC tablet  psyllium (METAMUCIL/KONSYL) 58.6 % powder        ALLERGIES:  Allergies   Allergen Reactions     Soy [Isoflavones] Other (See Comments)     Facial eczema        FAMILY HISTORY:  History reviewed. No pertinent family history.    SOCIAL HISTORY:   Social History     Socioeconomic History     Marital status: Single     Spouse name: None     Number of children: 0     Years of education: 13     Highest education level: High school graduate   Occupational History     Occupation: Student   Tobacco Use     Smoking status: Never     Smokeless tobacco: Never   Vaping Use     Vaping Use: Former     Start date: 1/1/2020     Quit date: 5/1/2022     Substances: Nicotine, THC, Flavoring     Devices: Disposable, Pre-filled or refillable cartridge   Substance and Sexual Activity     Alcohol use: " Not Currently     Drug use: Yes     Frequency: 4.0 times per week     Types: Marijuana     Sexual activity: Yes     Partners: Male     Birth control/protection: I.U.D.   Social History Narrative    [employer] in [place]. Taking a break now to save money and find her career path.    Living with her parents- Tian (cardiologist) & Marija    Stopped smoking weed when she went to college.    Nicotine in high school, otherwise not vaping or smoking    Works at [employer]   No tobacco.  Former alcoholism, occasional marijuana.    VITALS:  BP (!) 149/107   Pulse 104   Temp 99.3  F (37.4  C) (Oral)   Resp 16   Wt 49.7 kg (109 lb 9.6 oz)   SpO2 95%   BMI 18.81 kg/m      PHYSICAL EXAM    Vital Signs:  BP (!) 149/107   Pulse 104   Temp 99.3  F (37.4  C) (Oral)   Resp 16   Wt 49.7 kg (109 lb 9.6 oz)   SpO2 95%   BMI 18.81 kg/m    General:  On entering the room she is in no apparent distress.    Neck:  Neck supple with full range of motion and nontender.    Back:  Back and spine are nontender.  No costovertebral angle tenderness.    HEENT:  Oropharynx clear with moist mucous membranes.  HEENT unremarkable.    Pulmonary:  Chest clear to auscultation without rhonchi rales or wheezing.    Cardiovascular:  Cardiac regular rate and rhythm without murmurs rubs or gallops.    Abdomen:  Abdomen soft nontender.  There is no rebound or guarding.    Muskuloskeletal:  she moves all 4 without any difficulty and has normal neurovascular exams.  Extremities without clubbing, cyanosis, or edema.  Legs and calves are nontender.    Neuro:  she is alert and oriented ×3 and moves all extremities symmetrically.    Psych:  Normal affect.    Skin:  Unremarkable and warm and dry.       LAB:  All pertinent labs reviewed and interpreted.  Labs Ordered and Resulted from Time of ED Arrival to Time of ED Departure   COMPREHENSIVE METABOLIC PANEL - Abnormal       Result Value    Sodium 143      Potassium 4.0      Chloride 105       Carbon Dioxide (CO2) 24      Anion Gap 14      Urea Nitrogen 5.5 (*)     Creatinine 0.65      Calcium 9.6      Glucose 111 (*)     Alkaline Phosphatase 86      AST 21      ALT 11      Protein Total 7.5      Albumin 4.9      Bilirubin Total 0.4      GFR Estimate >90     HCG QUALITATIVE PREGNANCY - Normal    hCG Serum Qualitative Negative     LIPASE - Normal    Lipase 29     INR - Normal    INR 1.07     PARTIAL THROMBOPLASTIN TIME - Normal    aPTT 28     CBC WITH PLATELETS AND DIFFERENTIAL    WBC Count 8.5      RBC Count 4.43      Hemoglobin 13.5      Hematocrit 40.3      MCV 91      MCH 30.5      MCHC 33.5      RDW 13.0      Platelet Count 307      % Neutrophils 50      % Lymphocytes 38      % Monocytes 10      % Eosinophils 1      % Basophils 1      % Immature Granulocytes 0      NRBCs per 100 WBC 0      Absolute Neutrophils 4.4      Absolute Lymphocytes 3.2      Absolute Monocytes 0.8      Absolute Eosinophils 0.1      Absolute Basophils 0.0      Absolute Immature Granulocytes 0.0      Absolute NRBCs 0.0         RADIOLOGY:  Reviewed all pertinent imaging. Please see official radiology report.  CTA Abdomen Pelvis with Contrast   Final Result   IMPRESSION:   1.  No evidence for active GI bleed.   2.  Wall thickening involving portions of the sigmoid colon, indeterminate whether this is related to incomplete distention versus edema, possible colitis. No other inflammatory changes identified. If GI bleeding persists GI consultation and    sigmoidoscopy should be considered.                  EKG:          PROCEDURES:         I, Lina Stephens, am serving as a scribe to document services personally performed by Dr. Aponte based on my observation and the provider's statements to me. I, Mike Aponte MD attest that Lina Stephens is acting in a scribe capacity, has observed my performance of the services and has documented them in accordance with my direction.    Mike Aponte M.D.  Emergency Medicine  [place]  [facility] EMERGENCY DEPARTMENT  [address] [place] [id number]  [phone]  Dept: [phone]     Mike Aponte MD  03/30/23 0247

## 2023-03-30 NOTE — TELEPHONE ENCOUNTER
"ED/Discharge Protocol    \"Hi, my name is Lorena Pitts RN, a registered nurse, and I am calling on behalf of Dr. Latham's office at [place].  I am calling to follow up and see how things are going for you after your recent visit.\"    \"I see that you were in the (ER/UC/IP) on 03/29/2023.    How are you doing now that you are home?\" Pt feels almost back to normal    Is patient experiencing symptoms that may require a hospital visit?  No, Writer triaged pt's symptoms and determined home care was appropriate.     Discharge Instructions    \"Let's review your discharge instructions.  What is/are the follow-up recommendations?  Pt. Response: Take reglan for nausea and vomiting    \"Were you instructed to make a follow-up appointment?\"  Pt. Response: Yes.  Has appointment been made?   Yes      \"When you see the provider, I would recommend that you bring your discharge instructions with you.    Medications    \"How many new medications are you on since your hospitalization/ED visit?\"    0-1  \"How many of your current medicines changed (dose, timing, name, etc.) while you were in the hospital/ED visit?\"   0-1  \"Do you have questions about your medications?\"   No  \"Were you newly diagnosed with heart failure, COPD, diabetes or did you have a heart attack?\"   No  For patients on insulin: \"Did you start on insulin in the hospital or did you have your insulin dose changed?\"   No  Post Discharge Medication Reconciliation Status: unable to reconcile discharge medications due to outside of RN protocol.    Was MTM referral placed (*Make sure to put transitions as reason for referral)?   No    Call Summary    \"Do you have any questions or concerns about your condition or care plan at the moment?\"    No  Triage nurse advice given: yes, regarding blood on stool    Patient was in ER once in the past year (assess appropriateness of ER visits.)      \"If you have questions or things don't continue to improve, we encourage you " "contact us through the main clinic number,  [phone].  Even if the clinic is not open, triage nurses are available 24/7 to help you.     We would like you to know that our clinic has extended hours (provide information).  We also have urgent care (provide details on closest location and hours/contact info)\"      \"Thank you for your time and take care!\"      Lorena Pitts RN    "

## 2023-03-30 NOTE — ED TRIAGE NOTES
Pt was seen yesterday for nausea and vomiting. Pt had been constipated for three days. Today, pt reports one grey colored stool with a little blood and one episode of blood clots from her rectum. Pt reports having two shots this morning. Pt reports lightheadedness.      Triage Assessment     Row Name 03/30/23 1800       Triage Assessment (Adult)    Airway WDL WDL       Respiratory WDL    Respiratory WDL WDL       Skin Circulation/Temperature WDL    Skin Circulation/Temperature WDL WDL       Cardiac WDL    Cardiac WDL WDL       Peripheral/Neurovascular WDL    Peripheral Neurovascular WDL WDL       Cognitive/Neuro/Behavioral WDL    Cognitive/Neuro/Behavioral WDL WDL

## 2023-03-30 NOTE — TELEPHONE ENCOUNTER
S-(situation):   Pt reporting they had one episode of blood on their stool. Pt wanting to know what to do.    B-(background):   03/29/2023: Pt was in ED for nausea and vomiting. Pt placed on liquid diet for 24 hours.    Pt was constipated on 03/28/2023 took one dose of metamucil without results    A-(assessment):   Afebrile  Pt past first BM in 3 days, large, formed  Blood on stool, streaked, red  No blood on toilet paper or in toilet bowl  Pt denies rectal pain  Pt reports feeling much better now, at baseline    R-(recommendations):   Home care advised. Care advice given. Answered questions.    Lorena Pitts RN    Reason for Disposition    Anal fissure suspected (Bright red blood and only a few streaks on the surface of BM or toilet tissue)    Additional Information    Negative: Fainted or too weak to stand following large blood loss    Negative: Shock suspected (very weak, limp, not moving, gray skin, etc.)    Negative: Sounds like a life-threatening emergency to the triager    Negative: Red color BUT doesn't look like blood and has swallowed red food or medicine (including Omnicef)    Negative: Diarrhea with blood    Negative: Age < 12 weeks with fever 100.4 F (38.0 C) or higher rectally    Negative: Large amount of blood or blood passed alone without any stool    Negative: Vomited blood    Negative: Intussusception suspected (brief attacks of severe abdominal pain/crying suddenly switching to 2-10 minute periods of quiet) (age usually < 3 years)    Negative: Rectal foreign body (inserted or swallowed)    Negative: High-risk child (inflammatory bowel disease or other chronic gastrointestinal disease)    Negative: Followed an injury to anus or rectum    Negative: Child abuse suspected    Negative: Child sounds very sick or weak to the triager    Negative: Tarry or black-colored stool (not dark green)    Negative: Pink or tea-colored urine    Negative: Abdominal pain or crying persists > 1 hour    Negative:  "Skin bruises not caused by an injury    Negative: Note: Try to bring in a sample of the 'blood' for testing    Negative: Small amount of blood in stools and not previously diagnosed (Exception: Over 12 months old and anal fissure suspected)    Negative: Cow's milk allergy previously diagnosed and questions about    Negative: Bleeding from anal fissure recurs 3 or more times on treatment    Negative: Triager thinks child needs to be seen for non-urgent problem    Negative: Caller wants child seen for non-urgent problem    Answer Assessment - Initial Assessment Questions  1. APPEARANCE of BLOOD: \"What color is it?\" \"Does it look like blood?\" \"Is it passed separately, on the surface of the stool, or mixed in with the stool?\"       Red   2. AMOUNT: \"How much blood was passed?\"       Streaks of blood on stool  3. FREQUENCY: \"How many times has blood been passed with the stools?\"       once  4. ONSET: \"When was the blood first seen in the stools?\" (Days or weeks)       today  5. DIARRHEA: \"Is there also some diarrhea?\" If so, ask: \"How many diarrhea stools were passed today?\"       denies  6. CONSTIPATION: \"Is there also some constipation?\" If so, \"How bad is it?\"      Yes, first BM in 3 days  7. RECURRENT SYMPTOMS: \"Has your child had blood in the stools before?\" If so, ask: \"When was the last time?\" and \"What happened that time?\"       denies  8. CHILD'S APPEARANCE:\"How sick is your child acting?\" \" What is he doing right now?\" If asleep, ask: \"How was he acting before he went to sleep?\"      Pt feels \"completely fine now\"    Protocols used: STOOLS - BLOOD IN-P-OH      "

## 2023-03-30 NOTE — TELEPHONE ENCOUNTER
"See previous encounter for triage earlier today. Patient calling back because things have changed. Re-triaged patient.     Increased in amount of blood than earlier. Blood without passing a stool. Patient reports akila sized blood clots. States a little bit dizzy, but could be from being on a liquid diet.   Advised ED per protocol. Patient is agreeable with plan and verbalized understanding.     Jin Jaeger RN on 3/30/2023 at 5:25 PM    Reason for Disposition    [1] MODERATE rectal bleeding (small blood clots, passing blood without stool, or toilet water turns red) AND [2] more than once a day    Additional Information    Negative: Shock suspected (e.g., cold/pale/clammy skin, too weak to stand, low BP, rapid pulse)    Negative: Difficult to awaken or acting confused (e.g., disoriented, slurred speech)    Negative: Passed out (i.e., lost consciousness, collapsed and was not responding)    Negative: [1] Vomiting AND [2] contains red blood or black (\"coffee ground\") material  (Exception: few red streaks in vomit that only happened once)    Negative: Sounds like a life-threatening emergency to the triager    Negative: SEVERE rectal bleeding (large blood clots; constant or on and off bleeding)    Negative: SEVERE dizziness (e.g., unable to stand, requires support to walk, feels like passing out now)    Protocols used: RECTAL BLEEDING-A-AH      "

## 2023-03-31 ASSESSMENT — ACTIVITIES OF DAILY LIVING (ADL)
ADLS_ACUITY_SCORE: 35

## 2023-03-31 NOTE — DISCHARGE INSTRUCTIONS
Increase fluid intake, fruits and vegetables, bran and fiber, and so forth.  Metamucil, Citrucel, MiraLAX over-the-counter can also help.  Follow-up with your clinic in the next week.  See them sooner if worse or problems.

## 2023-04-07 ENCOUNTER — OFFICE VISIT (OUTPATIENT)
Dept: FAMILY MEDICINE | Facility: CLINIC | Age: 23
End: 2023-04-07
Payer: COMMERCIAL

## 2023-04-07 VITALS
BODY MASS INDEX: 18.36 KG/M2 | SYSTOLIC BLOOD PRESSURE: 117 MMHG | OXYGEN SATURATION: 97 % | WEIGHT: 110.2 LBS | HEART RATE: 96 BPM | HEIGHT: 65 IN | DIASTOLIC BLOOD PRESSURE: 75 MMHG

## 2023-04-07 DIAGNOSIS — F41.1 GENERALIZED ANXIETY DISORDER: ICD-10-CM

## 2023-04-07 DIAGNOSIS — Z91.018 SOY ALLERGY: ICD-10-CM

## 2023-04-07 DIAGNOSIS — K21.9 GASTROESOPHAGEAL REFLUX DISEASE WITHOUT ESOPHAGITIS: ICD-10-CM

## 2023-04-07 DIAGNOSIS — K62.5 RECTAL BLEEDING: Primary | ICD-10-CM

## 2023-04-07 DIAGNOSIS — R63.0 POOR APPETITE: ICD-10-CM

## 2023-04-07 PROCEDURE — 90471 IMMUNIZATION ADMIN: CPT | Performed by: NURSE PRACTITIONER

## 2023-04-07 PROCEDURE — 90686 IIV4 VACC NO PRSV 0.5 ML IM: CPT | Performed by: NURSE PRACTITIONER

## 2023-04-07 PROCEDURE — 99214 OFFICE O/P EST MOD 30 MIN: CPT | Mod: 25 | Performed by: NURSE PRACTITIONER

## 2023-04-07 PROCEDURE — 91312 COVID-19 VACCINE BIVALENT BOOSTER 12+ (PFIZER): CPT | Performed by: NURSE PRACTITIONER

## 2023-04-07 PROCEDURE — 0124A COVID-19 VACCINE BIVALENT BOOSTER 12+ (PFIZER): CPT | Performed by: NURSE PRACTITIONER

## 2023-04-07 RX ORDER — HYDROXYZINE PAMOATE 25 MG/1
25-50 CAPSULE ORAL 3 TIMES DAILY PRN
Qty: 30 CAPSULE | Refills: 1 | Status: SHIPPED | OUTPATIENT
Start: 2023-04-07 | End: 2023-08-31

## 2023-04-07 NOTE — PROGRESS NOTES
Assessment & Plan     Rectal bleeding  Resolved.  Likely associated with recent illness and constipation.    Generalized anxiety disorder  Referral for counseling.  May continue Hydroxyzine as needed.  - hydrOXYzine (VISTARIL) 25 MG capsule  Dispense: 30 capsule; Refill: 1  - Adult Mental Health  Referral    Poor appetite  Referral to nutrition for help with food choice for poor appetite, vegetarian diet, and soy allergy.  She does not seem to have any disordered eating at this time.  Encouraged mental health counseling, as anxiety is likely playing a part.   - Nutrition Referral    Soy allergy  - Nutrition Referral    Gastroesophageal reflux disease without esophagitis  Doing well on Pantoprazole.   - Nutrition Referral       MED REC REQUIRED  Post Medication Reconciliation Status:  Discharge medications reconciled, continue medications without change        Maranda Carson NP  [facility]    Liliana Cancino is a 22 year old who presents for ED follow-up.  Patient was seen in the ER on 3/29 with nausea and vomiting.  She was subsequently seen again on 3/30 with some rectal bleeding.  The nausea and vomiting has resolved.  She was not having any diarrhea, but had increased constipation.  She believes this caused the rectal bleeding.  She is feeling much better.  She has been having regular bowel movements without any blood in her stools.  She denies any fever or abdominal pain.    Patient is requesting a referral for a dietitian.  She has a history of GERD.  This seems to be better controlled with taking pantoprazole regularly.  She is still struggling with a low appetite and making good food choices.  She has a hard time eating consistently.  Patient follows a vegetarian diet.  She does have an allergy to soy, which makes this a little bit more difficult.  She will get a rash with soy.  She also finds that when she is more anxious she has a hard time eating regularly.   "Patient does not think she has any disordered eating such as withholding food or binging/purging.    History of anxiety.  She has been prescribed Lexapro, but never started taking.  She is requesting a referral for a counselor.  She was previously seeing someone at [facility] as well as [facility].  She had a good experience at [facility] and would like to see someone there again.  She uses hydroxyzine as needed for anxiety, which is infrequent.    Hospital F/U (ER on 3/30 for rectal bleeding ), Referral (Referral for GI specialist, as well as mental health therapist), and Nutrition Counseling (Struggling with her relationship with food and eating, would like to see a dietician)        Hospital Follow-up Visit:    Hospital/Nursing Home/IP Rehab Facility: [facility]  Date of Admission: 3/29/23, 3/30/23  Date of Discharge:   Reason(s) for Admission: Rectal bleeding, nausea    Was your hospitalization related to COVID-19? No   Problems taking medications regularly:  None  Medication changes since discharge: None  Problems adhering to non-medication therapy:  None    Summary of hospitalization:  [facility] discharge summary reviewed  Diagnostic Tests/Treatments reviewed.  Follow up needed: none  Other Healthcare Providers Involved in Patient s Care:         None  Update since discharge: improved.    Plan of care communicated with patient          Review of Systems   Pertinent items in HPI        Objective    /75 (BP Location: Left arm, Patient Position: Sitting, Cuff Size: Adult Regular)   Pulse 96   Ht 1.646 m (5' 4.8\")   Wt 50 kg (110 lb 3.2 oz)   SpO2 97%   BMI 18.45 kg/m    Body mass index is 18.45 kg/m .  Physical Exam   GENERAL: healthy, alert and no distress  RESP: lungs clear to auscultation - no rales, rhonchi or wheezes  CV: regular rate and rhythm, normal S1 S2, no S3 or S4, no murmur, click or rub, no peripheral edema  ABDOMEN: soft, " nontender, no hepatosplenomegaly, no masses and bowel sounds normal  PSYCH: mentation appears normal, affect normal

## 2023-04-07 NOTE — PROGRESS NOTES
Prior to immunization administration, verified patients identity using patient s name and date of birth. Please see Immunization Activity for additional information.     Screening Questionnaire for Adult Immunization    Are you sick today?   No   Do you have allergies to medications, food, a vaccine component or latex?   No   Have you ever had a serious reaction after receiving a vaccination?   No   Do you have a long-term health problem with heart, lung, kidney, or metabolic disease (e.g., diabetes), asthma, a blood disorder, no spleen, complement component deficiency, a cochlear implant, or a spinal fluid leak?  Are you on long-term aspirin therapy?   No   Do you have cancer, leukemia, HIV/AIDS, or any other immune system problem?   No   Do you have a parent, brother, or sister with an immune system problem?   No   In the past 3 months, have you taken medications that affect  your immune system, such as prednisone, other steroids, or anticancer drugs; drugs for the treatment of rheumatoid arthritis, Crohn s disease, or psoriasis; or have you had radiation treatments?   No   Have you had a seizure, or a brain or other nervous system problem?   No   During the past year, have you received a transfusion of blood or blood    products, or been given immune (gamma) globulin or antiviral drug?   No   For women: Are you pregnant or is there a chance you could become       pregnant during the next month?   No   Have you received any vaccinations in the past 4 weeks?   No     Immunization questionnaire answers were all negative.      Injection of COVID- 19 and flu given by Darcy Law LPN. Patient instructed to remain in clinic for 15 minutes afterwards, and to report any adverse reactions.     Screening performed by Darcy Law LPN on 4/7/2023 at 10:32 AM.

## 2023-04-17 ENCOUNTER — VIRTUAL VISIT (OUTPATIENT)
Dept: GASTROENTEROLOGY | Facility: CLINIC | Age: 23
End: 2023-04-17
Attending: NURSE PRACTITIONER

## 2023-04-17 DIAGNOSIS — Z91.018 SOY ALLERGY: ICD-10-CM

## 2023-04-17 DIAGNOSIS — R63.0 POOR APPETITE: ICD-10-CM

## 2023-04-17 DIAGNOSIS — K21.9 GASTROESOPHAGEAL REFLUX DISEASE WITHOUT ESOPHAGITIS: ICD-10-CM

## 2023-04-17 PROCEDURE — 97802 MEDICAL NUTRITION INDIV IN: CPT | Mod: 93 | Performed by: DIETITIAN, REGISTERED

## 2023-04-17 PROCEDURE — 99207 PR NO CHARGE LOS: CPT | Mod: 93 | Performed by: DIETITIAN, REGISTERED

## 2023-04-17 NOTE — PATIENT INSTRUCTIONS
It was nice speaking with you today. Below are the nutrition recommendations we discussed at your visit.    Please let me know if you have any additional questions.    Nutrition Recommendations    Aim for eating multiple smaller meals spread out throughout the day such as 6 smaller meals per day.    To help you eat multiple meals throughout the day, aim for eating something every 2-3 hours (okay if the meal is very small)    Drink at least 2 nutrition drinks or homemade smoothies per day. (These could as a small meal).    --Here are some plant-based options (Biosceptre and [employer] are soy free). A few other plant based options include Owyn, Ripple, Orgain vegan (double check that they are soy free). Or any protein powder/drink of your choice).    For more protein can switch to having greek yogurt.    Wait at least 3 hours after eating before lying down and going to sleep to prevent reflux overnight.    can recipes/easy to prepare vegetarian recipes.)    Your follow-up appointment is scheduled for May 22, 2023 at 2:00 PM. If you need to make any changes to your appointment, please call [phone].    Thank you,    Cheyenne Lentz, MS, RD, LD

## 2023-04-17 NOTE — PROGRESS NOTES
"Virtual Visit Details    Type of service:  Telephone Visit   Phone call duration: 29 minutes     [facility] Outpatient Medical Nutrition Therapy      Time Spent: 29 minutes  Session Type:  Initial   Referring Physician:  Maranda Carson   Reason for RD Visit:   GERD, poor appetite, soy allergy    Nutrition Assessment:  Patient is a 22 year old female with history that includes GERD, soy allergy and poor appetite.  She stated that she has had acid reflux since high school which is about 4 years or more.  It is on and off better at times and then other times worse.  She has no appetite, does not crave food and struggles with eating in general due to no appetite, nothing is appealing to her and picky about how food is prepared.  She also follows a vegetarian diet, but has a soy allergy so unable to eat soy substitutes for protein.  She does not like eggs.  She eats yogurt and dairy.  She used to make smoothies but has not been making these recently generally due to no appetite during the day she tries to eat some yogurt or applesauce sometimes both before the evening time if her reflux is not bad.  She finally gets hungry about 7 PM and then eats some food, but tries to keep it more plain like Posta or Posta with some Hipolito sauce or rice or her parents bring home Chipotle beans and lettuce and rice for her.    Height:   Ht Readings from Last 1 Encounters:   04/07/23 1.646 m (5' 4.8\")   ]  Weight:  Wt Readings from Last 10 Encounters:   04/07/23 50 kg (110 lb 3.2 oz)   03/30/23 49.7 kg (109 lb 9.6 oz)   03/29/23 47.6 kg (105 lb)   12/20/22 51.7 kg (114 lb)   11/26/22 47.6 kg (105 lb)   09/19/22 46.1 kg (101 lb 9.6 oz) (5 %, Z= -1.68)*   08/25/22 45.1 kg (99 lb 6.4 oz) (3 %, Z= -1.87)*   08/17/22 46.7 kg (103 lb) (6 %, Z= -1.56)*   08/03/22 46.9 kg (103 lb 6.6 oz) (6 %, Z= -1.52)*   07/20/22 46.8 kg (103 lb 1.6 oz) (6 %, Z= -1.55)*     * Growth percentiles are based on CDC (Girls, 2-20 Years) data.        BMI: " "Estimated body mass index is 18.45 kg/m  as calculated from the following:    Height as of 4/7/23: 1.646 m (5' 4.8\").    Weight as of 4/7/23: 50 kg (110 lb 3.2 oz).    Diet Recall:  (some usual/recent meals/snacks/beverages): Vegetarian but allergic to soy. Eats cheese, peanut butter,   Meal Food    Breakfast Yogurt or applesauce or skips   Lunch Yogurt or applesauce or skips   Dinner 7 PM: Pasta sometimes season salt or with ronnie or rice (keep plain and gags) or chipotle, rice, beans and lettuce   Snacks Sometimes pistachios or cashews in evening or cheese and apple   Beverages 40 oz Water, was in the hospital propel, gatorade   Alcohol Intake Doesn't feel well/cramping next day. About 1 time per month: 5 drinks white wine over a span of a long time     Frequency of eating/taking out meals: lives with parents who take out food all of the time. ~3 times per week.     Labs:    Last Comprehensive Metabolic Panel:  Sodium   Date Value Ref Range Status   03/30/2023 143 136 - 145 mmol/L Final     Potassium   Date Value Ref Range Status   03/30/2023 4.0 3.4 - 5.3 mmol/L Final   07/03/2022 3.7 3.5 - 5.0 mmol/L Final     Chloride   Date Value Ref Range Status   03/30/2023 105 98 - 107 mmol/L Final   07/03/2022 107 98 - 107 mmol/L Final     Carbon Dioxide (CO2)   Date Value Ref Range Status   03/30/2023 24 22 - 29 mmol/L Final   07/03/2022 26 22 - 31 mmol/L Final     Anion Gap   Date Value Ref Range Status   03/30/2023 14 7 - 15 mmol/L Final   07/03/2022 9 5 - 18 mmol/L Final     Glucose   Date Value Ref Range Status   03/30/2023 111 (H) 70 - 99 mg/dL Final   07/03/2022 81 70 - 125 mg/dL Final     Urea Nitrogen   Date Value Ref Range Status   03/30/2023 5.5 (L) 6.0 - 20.0 mg/dL Final   07/03/2022 10 8 - 22 mg/dL Final     Creatinine   Date Value Ref Range Status   03/30/2023 0.65 0.51 - 0.95 mg/dL Final     GFR Estimate   Date Value Ref Range Status   03/30/2023 >90 >60 mL/min/1.73m2 Final     Comment:     eGFR calculated " using 2021 CKD-EPI equation.     Calcium   Date Value Ref Range Status   03/30/2023 9.6 8.6 - 10.0 mg/dL Final     CBC RESULTS:   Recent Labs   Lab Test 03/30/23 1954   WBC 8.5   RBC 4.43   HGB 13.5   HCT 40.3   MCV 91   MCH 30.5   MCHC 33.5   RDW 13.0          Pertinent Medications/vitamin and mineral supplements:      Current Outpatient Medications   Medication     escitalopram (LEXAPRO) 10 MG tablet     hydrOXYzine (VISTARIL) 25 MG capsule     levonorgestrel (MIRENA) 20 mcg/24 hours (5 yrs) 52 mg IUD     metoclopramide (REGLAN) 10 MG tablet     ondansetron (ZOFRAN ODT) 4 MG ODT tab     ondansetron (ZOFRAN) 4 MG tablet     pantoprazole (PROTONIX) 40 MG EC tablet     psyllium (METAMUCIL/KONSYL) 58.6 % powder     No current facility-administered medications for this visit.       Food Allergies: Soy    MALNUTRITION:  % Weight Loss:  Weight loss does not meet criteria for malnutrition   % Intake:  <75% for >/= 3 months (moderate malnutrition)  Subcutaneous Fat Loss: Unable to assess  Muscle Loss: Unable to assess  Fluid Retention:  None noted    Malnutrition Diagnosis: Patient does not meet two of the above criteria necessary for diagnosing malnutrition  In Context of:  Acute illness or injury  Chronic illness or disease    Nutrition Prescription: Nutrition education    Nutrition Intervention      Provided tips for GERD and decreased appetite as well as answered her questions with increasing protein/vegetarian protein options that do not contain soy.  Recommended eating multiple smaller meals spread out throughout the day.  Encouraged her to restart having some protein smoothies or soy free protein drinks and discussed some options.  Recommended stop eating 3 hours before bedtime to prevent overnight reflux.  Discussed website with some easy to prepare a vegetarian options.    Patient verbalized understanding of education provided. See Goals below.     Educational Materials Provided: GERD nutrition  therapy    Goals:    See AVS/wrap-up for goals.  Nutrition Monitoring and Evaluation: Will monitor adherence to nutrition recommendations at future RD visits.     Further Medical Nutrition Therapy:  Follow-up scheduled May 22, 2023 at 2 PM    Patient was encouraged to call/contact RD with any further questions.    Cheyenne Lentz, MS, RD, LD

## 2023-04-17 NOTE — LETTER
"    4/17/2023         RE: Julita Fernandez  [address] [facility] [place] [address]        Dear Colleague,    Thank you for referring your patient, Julita Fernandez, to the [facility] [place]. Please see a copy of my visit note below.    Virtual Visit Details    Type of service:  Telephone Visit   Phone call duration: 29 minutes     [facility] Outpatient Medical Nutrition Therapy      Time Spent: 29 minutes  Session Type:  Initial   Referring Physician:  Maranda Carson   Reason for RD Visit:   GERD, poor appetite, soy allergy    Nutrition Assessment:  Patient is a 22 year old female with history that includes GERD, soy allergy and poor appetite.  She stated that she has had acid reflux since high school which is about 4 years or more.  It is on and off better at times and then other times worse.  She has no appetite, does not crave food and struggles with eating in general due to no appetite, nothing is appealing to her and picky about how food is prepared.  She also follows a vegetarian diet, but has a soy allergy so unable to eat soy substitutes for protein.  She does not like eggs.  She eats yogurt and dairy.  She used to make smoothies but has not been making these recently generally due to no appetite during the day she tries to eat some yogurt or applesauce sometimes both before the evening time if her reflux is not bad.  She finally gets hungry about 7 PM and then eats some food, but tries to keep it more plain like Posta or Posta with some Hipolito sauce or rice or her parents bring home Chipotle beans and lettuce and rice for her.    Height:   Ht Readings from Last 1 Encounters:   04/07/23 1.646 m (5' 4.8\")   ]  Weight:  Wt Readings from Last 10 Encounters:   04/07/23 50 kg (110 lb 3.2 oz)   03/30/23 49.7 kg (109 lb 9.6 oz)   03/29/23 47.6 kg (105 lb)   12/20/22 51.7 kg (114 lb)   11/26/22 47.6 kg (105 lb)   09/19/22 46.1 kg (101 lb 9.6 oz) (5 %, Z= -1.68)*   08/25/22 " "45.1 kg (99 lb 6.4 oz) (3 %, Z= -1.87)*   08/17/22 46.7 kg (103 lb) (6 %, Z= -1.56)*   08/03/22 46.9 kg (103 lb 6.6 oz) (6 %, Z= -1.52)*   07/20/22 46.8 kg (103 lb 1.6 oz) (6 %, Z= -1.55)*     * Growth percentiles are based on CDC (Girls, 2-20 Years) data.        BMI: Estimated body mass index is 18.45 kg/m  as calculated from the following:    Height as of 4/7/23: 1.646 m (5' 4.8\").    Weight as of 4/7/23: 50 kg (110 lb 3.2 oz).    Diet Recall:  (some usual/recent meals/snacks/beverages): Vegetarian but allergic to soy. Eats cheese, peanut butter,   Meal Food    Breakfast Yogurt or applesauce or skips   Lunch Yogurt or applesauce or skips   Dinner 7 PM: Pasta sometimes season salt or with ronnie or rice (keep plain and gags) or chipotle, rice, beans and lettuce   Snacks Sometimes pistachios or cashews in evening or cheese and apple   Beverages 40 oz Water, was in the hospital propel, gatorade   Alcohol Intake Doesn't feel well/cramping next day. About 1 time per month: 5 drinks white wine over a span of a long time     Frequency of eating/taking out meals: lives with parents who take out food all of the time. ~3 times per week.     Labs:    Last Comprehensive Metabolic Panel:  Sodium   Date Value Ref Range Status   03/30/2023 143 136 - 145 mmol/L Final     Potassium   Date Value Ref Range Status   03/30/2023 4.0 3.4 - 5.3 mmol/L Final   07/03/2022 3.7 3.5 - 5.0 mmol/L Final     Chloride   Date Value Ref Range Status   03/30/2023 105 98 - 107 mmol/L Final   07/03/2022 107 98 - 107 mmol/L Final     Carbon Dioxide (CO2)   Date Value Ref Range Status   03/30/2023 24 22 - 29 mmol/L Final   07/03/2022 26 22 - 31 mmol/L Final     Anion Gap   Date Value Ref Range Status   03/30/2023 14 7 - 15 mmol/L Final   07/03/2022 9 5 - 18 mmol/L Final     Glucose   Date Value Ref Range Status   03/30/2023 111 (H) 70 - 99 mg/dL Final   07/03/2022 81 70 - 125 mg/dL Final     Urea Nitrogen   Date Value Ref Range Status   03/30/2023 5.5 " (L) 6.0 - 20.0 mg/dL Final   07/03/2022 10 8 - 22 mg/dL Final     Creatinine   Date Value Ref Range Status   03/30/2023 0.65 0.51 - 0.95 mg/dL Final     GFR Estimate   Date Value Ref Range Status   03/30/2023 >90 >60 mL/min/1.73m2 Final     Comment:     eGFR calculated using 2021 CKD-EPI equation.     Calcium   Date Value Ref Range Status   03/30/2023 9.6 8.6 - 10.0 mg/dL Final     CBC RESULTS:   Recent Labs   Lab Test 03/30/23 1954   WBC 8.5   RBC 4.43   HGB 13.5   HCT 40.3   MCV 91   MCH 30.5   MCHC 33.5   RDW 13.0          Pertinent Medications/vitamin and mineral supplements:      Current Outpatient Medications   Medication    escitalopram (LEXAPRO) 10 MG tablet    hydrOXYzine (VISTARIL) 25 MG capsule    levonorgestrel (MIRENA) 20 mcg/24 hours (5 yrs) 52 mg IUD    metoclopramide (REGLAN) 10 MG tablet    ondansetron (ZOFRAN ODT) 4 MG ODT tab    ondansetron (ZOFRAN) 4 MG tablet    pantoprazole (PROTONIX) 40 MG EC tablet    psyllium (METAMUCIL/KONSYL) 58.6 % powder     No current facility-administered medications for this visit.       Food Allergies: Soy    MALNUTRITION:  % Weight Loss:  Weight loss does not meet criteria for malnutrition   % Intake:  <75% for >/= 3 months (moderate malnutrition)  Subcutaneous Fat Loss: Unable to assess  Muscle Loss: Unable to assess  Fluid Retention:  None noted    Malnutrition Diagnosis: Patient does not meet two of the above criteria necessary for diagnosing malnutrition  In Context of:  Acute illness or injury  Chronic illness or disease    Nutrition Prescription: Nutrition education    Nutrition Intervention      Provided tips for GERD and decreased appetite as well as answered her questions with increasing protein/vegetarian protein options that do not contain soy.  Recommended eating multiple smaller meals spread out throughout the day.  Encouraged her to restart having some protein smoothies or soy free protein drinks and discussed some options.  Recommended stop  eating 3 hours before bedtime to prevent overnight reflux.  Discussed website with some easy to prepare a vegetarian options.    Patient verbalized understanding of education provided. See Goals below.     Educational Materials Provided: GERD nutrition therapy    Goals:    See AVS/wrap-up for goals.  Nutrition Monitoring and Evaluation: Will monitor adherence to nutrition recommendations at future RD visits.     Further Medical Nutrition Therapy:  Follow-up scheduled May 22, 2023 at 2 PM    Patient was encouraged to call/contact RD with any further questions.            Again, thank you for allowing me to participate in the care of your patient.      Sincerely,    Cheyenne Lentz RD

## 2023-04-17 NOTE — NURSING NOTE
Is the patient currently in the state of [place]? YES    Visit mode:TELEPHONE    If the visit is dropped, the patient can be reconnected by: TELEPHONE VISIT: Phone number: [phone]    Will anyone else be joining the visit? NO      How would you like to obtain your AVS? MyChart    Are changes needed to the allergy or medication list? NO    Reason for visit: Telephone Visit

## 2023-05-22 ENCOUNTER — VIRTUAL VISIT (OUTPATIENT)
Dept: GASTROENTEROLOGY | Facility: CLINIC | Age: 23
End: 2023-05-22
Payer: COMMERCIAL

## 2023-05-22 VITALS — WEIGHT: 105 LBS | BODY MASS INDEX: 17.58 KG/M2

## 2023-05-22 DIAGNOSIS — R63.0 POOR APPETITE: ICD-10-CM

## 2023-05-22 DIAGNOSIS — K21.9 GASTROESOPHAGEAL REFLUX DISEASE WITHOUT ESOPHAGITIS: ICD-10-CM

## 2023-05-22 DIAGNOSIS — Z91.018 SOY ALLERGY: ICD-10-CM

## 2023-05-22 PROCEDURE — 99207 PR NO CHARGE LOS: CPT | Mod: 93 | Performed by: DIETITIAN, REGISTERED

## 2023-05-22 PROCEDURE — 97803 MED NUTRITION INDIV SUBSEQ: CPT | Mod: 93 | Performed by: DIETITIAN, REGISTERED

## 2023-05-22 ASSESSMENT — PAIN SCALES - GENERAL: PAINLEVEL: NO PAIN (0)

## 2023-05-22 NOTE — LETTER
5/22/2023         RE: Julita Fernandez  [address]  [facility] [address]        Dear Colleague,    Thank you for referring your patient, Julita Fernandez, to the [facility] [place]. Please see a copy of my visit note below.    Julita Fernandez is a 22 year old female who is being evaluated via a billable phone visit.        [facility] Outpatient Medical Nutrition Therapy      Time Spent: 28 minutes  Session Type:  Follow up  Referring Physician:  Maranda Carson   Reason for RD Visit:   GERD, poor appetite, soy allergy    Nutrition Assessment:  Patient is a 22 year old female with history that includes GERD, soy allergy and poor appetite.  She stated that she has had acid reflux since high school which is about 4 years or more.  It is on and off better at times and then other times worse.  She has no appetite, does not crave food and struggles with eating in general due to no appetite, nothing is appealing to her and picky about how food is prepared.  She also follows a vegetarian diet, but has a soy allergy so unable to eat soy substitutes for protein.  She does not like eggs.  She eats yogurt and dairy.  She used to make smoothies but has not been making these recently generally due to no appetite during the day she tries to eat some yogurt or applesauce sometimes both before the evening time if her reflux is not bad.  She finally gets hungry about 7 PM and then eats some food, but tries to keep it more plain like Posta or Posta with some Hipolito sauce or rice or her parents bring home Chipotle beans and lettuce and rice for her.    At follow up visit today, she stated that she is still struggling with having a low appetite. Wakes up not hungry so starts with a popsicle which helps her have some appetite. Still struggling with what to eat for breakfast. Doesn't like eggs, tired of bagels so not sure what to eat and reported picky eater. Was thinking about re-trying  "oatmeal. She stated that her symptoms have been better with medication. She recently ran of our her medication a few days ago so now her reflux is getting worse. Plan to refill her medication. Symptoms are usually worse in the morning. She is not certain what her current weight is. She estimated at check in today that it is about 105 but unsure of actual weight. She doesn't feel like she has lost any weight though. She hasn't looked into any nutrition drinks yet. Based on conversation today, she would like to add in some additional snack throughout the day, and get some items that she hasn't been eating much of lately like yogurt. She is okay with doing some cooking but it can be difficult to get a lot of different ingredients so likes to use what is in the house.     Height:   Ht Readings from Last 1 Encounters:   04/07/23 1.646 m (5' 4.8\")     Weight: She isn't certain of her current weight. The 105 lbs from today (5/22), is an estimated that she reported at check in.  Wt Readings from Last 10 Encounters:   05/22/23 47.6 kg (105 lb)--pt estimated at check in   04/07/23 50 kg (110 lb 3.2 oz)   03/30/23 49.7 kg (109 lb 9.6 oz)   03/29/23 47.6 kg (105 lb)   12/20/22 51.7 kg (114 lb)   11/26/22 47.6 kg (105 lb)   09/19/22 46.1 kg (101 lb 9.6 oz) (5 %, Z= -1.68)*   08/25/22 45.1 kg (99 lb 6.4 oz) (3 %, Z= -1.87)*   08/17/22 46.7 kg (103 lb) (6 %, Z= -1.56)*   08/03/22 46.9 kg (103 lb 6.6 oz) (6 %, Z= -1.52)*     * Growth percentiles are based on [facility] (Girls, 2-20 Years) data.        BMI: Estimated body mass index is 17.58 kg/m  as calculated from the following:    Height as of 4/7/23: 1.646 m (5' 4.8\").    Weight as of this encounter: 47.6 kg (105 lb).    Diet Recall:  (some usual/recent meals/snacks/beverages): Vegetarian but allergic to soy. Eats cheese, peanut butter,   Meal Food    Breakfast Outshine popsicle   Lunch Bagel with cream cheese   Dinner 7 PM: Pasta sometimes season salt or with ronnie or rice (keep " plain and gags) or chipotle, rice, beans and lettuce. ocas veggie burger with cheese    Snacks PB on celery or apple with PB or cheese crackers   Beverages 5 x 12-16 oz glasses Water   Alcohol Intake Doesn't feel well/cramping next day. About 1 time per month: 5 drinks white wine over a span of a long time     Frequency of eating/taking out meals: lives with parents who take out food all of the time. ~3 times per week.     Labs:    Last Comprehensive Metabolic Panel:  Sodium   Date Value Ref Range Status   03/30/2023 143 136 - 145 mmol/L Final     Potassium   Date Value Ref Range Status   03/30/2023 4.0 3.4 - 5.3 mmol/L Final   07/03/2022 3.7 3.5 - 5.0 mmol/L Final     Chloride   Date Value Ref Range Status   03/30/2023 105 98 - 107 mmol/L Final   07/03/2022 107 98 - 107 mmol/L Final     Carbon Dioxide (CO2)   Date Value Ref Range Status   03/30/2023 24 22 - 29 mmol/L Final   07/03/2022 26 22 - 31 mmol/L Final     Anion Gap   Date Value Ref Range Status   03/30/2023 14 7 - 15 mmol/L Final   07/03/2022 9 5 - 18 mmol/L Final     Glucose   Date Value Ref Range Status   03/30/2023 111 (H) 70 - 99 mg/dL Final   07/03/2022 81 70 - 125 mg/dL Final     Urea Nitrogen   Date Value Ref Range Status   03/30/2023 5.5 (L) 6.0 - 20.0 mg/dL Final   07/03/2022 10 8 - 22 mg/dL Final     Creatinine   Date Value Ref Range Status   03/30/2023 0.65 0.51 - 0.95 mg/dL Final     GFR Estimate   Date Value Ref Range Status   03/30/2023 >90 >60 mL/min/1.73m2 Final     Comment:     eGFR calculated using 2021 CKD-EPI equation.     Calcium   Date Value Ref Range Status   03/30/2023 9.6 8.6 - 10.0 mg/dL Final     CBC RESULTS:   Recent Labs   Lab Test 03/30/23 1954   WBC 8.5   RBC 4.43   HGB 13.5   HCT 40.3   MCV 91   MCH 30.5   MCHC 33.5   RDW 13.0        Pertinent Medications/vitamin and mineral supplements:   Reported taking a daily multivitamin, pantoprazole. hydroxyzine    Food Allergies: Soy    MALNUTRITION:  % Weight Loss:  Weight  loss does not meet criteria for malnutrition   % Intake:  <75% for >/= 3 months (moderate malnutrition)  Subcutaneous Fat Loss: Unable to assess  Muscle Loss: Unable to assess  Fluid Retention:  None noted    Malnutrition Diagnosis: Patient does not meet two of the above criteria necessary for diagnosing malnutrition  In Context of:  Acute illness or injury  Chronic illness or disease    Nutrition Prescription: Nutrition education    Nutrition Intervention  Provided tips for GERD and decreased appetite as well as answered her questions with increasing protein/vegetarian protein options that do not contain soy.  Recommended eating multiple smaller meals spread out throughout the day.  Encouraged her to restart having some protein smoothies or soy free protein drinks and discussed some options.  Recommended stop eating 3 hours before bedtime to prevent overnight reflux.  Discussed website with some easy to prepare a vegetarian options.    Patient verbalized understanding of education provided. See Goals below.     Educational Materials Provided: GERD nutrition therapy    Goals:    Aim for eating multiple smaller meals (smaller meals and snacks) spread out throughout the day such as 5-6 smaller meals per day.     --To help you eat multiple meals throughout the day, aim for eating something every 2-3 hours (okay if the meal is very small).    --Based on what you are currently doing, can aim for adding in some additional snacks such as yogurt.    --Here are a few ideas we discussed for some more variety:     -can add either cheese or peanut butter on your bagel at breakfast.   -can have some oatmeal.   -vegetarian chili   -greek yogurt (or any yogurt you like but greek yogurt is higher in protein).    Drink at least 1-2 nutrition drinks or homemade smoothies per day. (These count as a small meal).     --Here are some plant-based options (Aqua-tools are soy free). A few other plant based options include Ulises,  "Ripple, Orgain vegan (double check that they are soy free). Or any protein powder/drink of your choice). Evolve, Ripple, Orgain Vegan and Owyn are available at [employer].     For more protein can switch to having greek yogurt.     Wait at least 3 hours after eating before lying down and going to sleep to prevent reflux overnight.     KAL has some easy to prepare vegetarian recipes. Look under the \"Simple Factor\" recipes which include one pot dishes as well as recipes with few ingredients.    Nutrition Monitoring and Evaluation: Will monitor adherence to nutrition recommendations at future RD visits.     Further Medical Nutrition Therapy:  Follow-up in 2-3 months    Patient was encouraged to call/contact RD with any further questions.      Again, thank you for allowing me to participate in the care of your patient.      Sincerely,    Cheyenne Lentz RD    "

## 2023-05-22 NOTE — PROGRESS NOTES
Julita Fernandez is a 22 year old female who is being evaluated via a billable phone visit.      Virtual Visit Details    Type of service:  Telephone Visit   Phone call duration: 28 minutes     [facility] Outpatient Medical Nutrition Therapy      Time Spent: 28 minutes  Session Type:  Follow up  Referring Physician:  Maranda Carson   Reason for RD Visit:   GERD, poor appetite, soy allergy    Nutrition Assessment:  Patient is a 22 year old female with history that includes GERD, soy allergy and poor appetite.  She stated that she has had acid reflux since high school which is about 4 years or more.  It is on and off better at times and then other times worse.  She has no appetite, does not crave food and struggles with eating in general due to no appetite, nothing is appealing to her and picky about how food is prepared.  She also follows a vegetarian diet, but has a soy allergy so unable to eat soy substitutes for protein.  She does not like eggs.  She eats yogurt and dairy.  She used to make smoothies but has not been making these recently generally due to no appetite during the day she tries to eat some yogurt or applesauce sometimes both before the evening time if her reflux is not bad.  She finally gets hungry about 7 PM and then eats some food, but tries to keep it more plain like Posta or Posta with some Hipolito sauce or rice or her parents bring home Chipotle beans and lettuce and rice for her.    At follow up visit today, she stated that she is still struggling with having a low appetite. Wakes up not hungry so starts with a popsicle which helps her have some appetite. Still struggling with what to eat for breakfast. Doesn't like eggs, tired of bagels so not sure what to eat and reported picky eater. Was thinking about re-trying oatmeal. She stated that her symptoms have been better with medication. She recently ran of our her medication a few days ago so now her reflux is getting worse. Plan to refill  "her medication. Symptoms are usually worse in the morning. She is not certain what her current weight is. She estimated at check in today that it is about 105 but unsure of actual weight. She doesn't feel like she has lost any weight though. She hasn't looked into any nutrition drinks yet. Based on conversation today, she would like to add in some additional snack throughout the day, and get some items that she hasn't been eating much of lately like yogurt. She is okay with doing some cooking but it can be difficult to get a lot of different ingredients so likes to use what is in the house.     Height:   Ht Readings from Last 1 Encounters:   04/07/23 1.646 m (5' 4.8\")     Weight: She isn't certain of her current weight. The 105 lbs from today (5/22), is an estimated that she reported at check in.  Wt Readings from Last 10 Encounters:   05/22/23 47.6 kg (105 lb)--pt estimated at check in   04/07/23 50 kg (110 lb 3.2 oz)   03/30/23 49.7 kg (109 lb 9.6 oz)   03/29/23 47.6 kg (105 lb)   12/20/22 51.7 kg (114 lb)   11/26/22 47.6 kg (105 lb)   09/19/22 46.1 kg (101 lb 9.6 oz) (5 %, Z= -1.68)*   08/25/22 45.1 kg (99 lb 6.4 oz) (3 %, Z= -1.87)*   08/17/22 46.7 kg (103 lb) (6 %, Z= -1.56)*   08/03/22 46.9 kg (103 lb 6.6 oz) (6 %, Z= -1.52)*     * Growth percentiles are based on CDC (Girls, 2-20 Years) data.        BMI: Estimated body mass index is 17.58 kg/m  as calculated from the following:    Height as of 4/7/23: 1.646 m (5' 4.8\").    Weight as of this encounter: 47.6 kg (105 lb).    Diet Recall:  (some usual/recent meals/snacks/beverages): Vegetarian but allergic to soy. Eats cheese, peanut butter,   Meal Food    Breakfast Outshine popsicle   Lunch Bagel with cream cheese   Dinner 7 PM: Pasta sometimes season salt or with ronnie or rice (keep plain and gags) or chipotle, rice, beans and lettuce. ocas veggie burger with cheese    Snacks PB on celery or apple with PB or cheese crackers   Beverages 5 x 12-16 oz glasses " Water   Alcohol Intake Doesn't feel well/cramping next day. About 1 time per month: 5 drinks white wine over a span of a long time     Frequency of eating/taking out meals: lives with parents who take out food all of the time. ~3 times per week.     Labs:    Last Comprehensive Metabolic Panel:  Sodium   Date Value Ref Range Status   03/30/2023 143 136 - 145 mmol/L Final     Potassium   Date Value Ref Range Status   03/30/2023 4.0 3.4 - 5.3 mmol/L Final   07/03/2022 3.7 3.5 - 5.0 mmol/L Final     Chloride   Date Value Ref Range Status   03/30/2023 105 98 - 107 mmol/L Final   07/03/2022 107 98 - 107 mmol/L Final     Carbon Dioxide (CO2)   Date Value Ref Range Status   03/30/2023 24 22 - 29 mmol/L Final   07/03/2022 26 22 - 31 mmol/L Final     Anion Gap   Date Value Ref Range Status   03/30/2023 14 7 - 15 mmol/L Final   07/03/2022 9 5 - 18 mmol/L Final     Glucose   Date Value Ref Range Status   03/30/2023 111 (H) 70 - 99 mg/dL Final   07/03/2022 81 70 - 125 mg/dL Final     Urea Nitrogen   Date Value Ref Range Status   03/30/2023 5.5 (L) 6.0 - 20.0 mg/dL Final   07/03/2022 10 8 - 22 mg/dL Final     Creatinine   Date Value Ref Range Status   03/30/2023 0.65 0.51 - 0.95 mg/dL Final     GFR Estimate   Date Value Ref Range Status   03/30/2023 >90 >60 mL/min/1.73m2 Final     Comment:     eGFR calculated using 2021 CKD-EPI equation.     Calcium   Date Value Ref Range Status   03/30/2023 9.6 8.6 - 10.0 mg/dL Final     CBC RESULTS:   Recent Labs   Lab Test 03/30/23 1954   WBC 8.5   RBC 4.43   HGB 13.5   HCT 40.3   MCV 91   MCH 30.5   MCHC 33.5   RDW 13.0        Pertinent Medications/vitamin and mineral supplements:   Reported taking a daily multivitamin, pantoprazole. hydroxyzine    Food Allergies: Soy    MALNUTRITION:  % Weight Loss:  Weight loss does not meet criteria for malnutrition   % Intake:  <75% for >/= 3 months (moderate malnutrition)  Subcutaneous Fat Loss: Unable to assess  Muscle Loss: Unable to  assess  Fluid Retention:  None noted    Malnutrition Diagnosis: Patient does not meet two of the above criteria necessary for diagnosing malnutrition  In Context of:  Acute illness or injury  Chronic illness or disease    Nutrition Prescription: Nutrition education    Nutrition Intervention      Provided tips for GERD and decreased appetite as well as answered her questions with increasing protein/vegetarian protein options that do not contain soy.  Recommended eating multiple smaller meals spread out throughout the day.  Encouraged her to restart having some protein smoothies or soy free protein drinks and discussed some options.  Recommended stop eating 3 hours before bedtime to prevent overnight reflux.  Discussed website with some easy to prepare a vegetarian options.    Patient verbalized understanding of education provided. See Goals below.     Educational Materials Provided: GERD nutrition therapy    Goals:    1. Aim for eating multiple smaller meals (smaller meals and snacks) spread out throughout the day such as 5-6 smaller meals per day.     --To help you eat multiple meals throughout the day, aim for eating something every 2-3 hours (okay if the meal is very small).    --Based on what you are currently doing, can aim for adding in some additional snacks such as yogurt.    --Here are a few ideas we discussed for some more variety:     -can add either cheese or peanut butter on your bagel at breakfast.   -can have some oatmeal.   -vegetarian chili   -greek yogurt (or any yogurt you like but greek yogurt is higher in protein).    2. Drink at least 1-2 nutrition drinks or homemade smoothies per day. (These count as a small meal).     --Here are some plant-based options (Junar and [employer] are soy free). A few other plant based options include Owyn, Ripple, Orgain vegan (double check that they are soy free). Or any protein powder/drink of your choice). Evolve, Ripple, Orgain Vegan and Owyn are available at  "[employer].     3. For more protein can switch to having greek yogurt.     4. Wait at least 3 hours after eating before lying down and going to sleep to prevent reflux overnight.     5. [employer] has some easy to prepare vegetarian recipes. Look under the \"Simple Factor\" recipes which include one pot dishes as well as recipes with few ingredients.    Nutrition Monitoring and Evaluation: Will monitor adherence to nutrition recommendations at future RD visits.     Further Medical Nutrition Therapy:  Follow-up in 2-3 months    Patient was encouraged to call/contact RD with any further questions.    Cheyenne Lentz, MS, RD, LD        "

## 2023-05-22 NOTE — PATIENT INSTRUCTIONS
"It was nice speaking with you today. Below are the nutrition recommendations we discussed at your visit.     Please let me know if you have any additional questions.     Nutrition Recommendations     Aim for eating multiple smaller meals (smaller meals and snacks) spread out throughout the day such as 5-6 smaller meals per day.     --To help you eat multiple meals throughout the day, aim for eating something every 2-3 hours (okay if the meal is very small).    --Based on what you are currently doing, can aim for adding in some additional snacks such as yogurt.    --Here are a few ideas we discussed for some more variety:     -can add either cheese or peanut butter on your bagel at breakfast.   -can have some oatmeal.   -vegetarian chili   -greek yogurt (or any yogurt you like but greek yogurt is higher in protein).    Drink at least 1-2 nutrition drinks or homemade smoothies per day. (These count as a small meal).     --Here are some plant-based options ([employer] and [employer] are soy free). A few other plant based options include Owyn, Ripple, Orgain vegan (double check that they are soy free). Or any protein powder/drink of your choice). Evolve, Ripple, Orgain Vegan and Owyn are available at [employer].     For more protein can switch to having greek yogurt.     Wait at least 3 hours after eating before lying down and going to sleep to prevent reflux overnight.     [employer] has some easy to prepare vegetarian recipes. Look under the \"Simple Factor\" recipes which include one pot dishes as well as recipes with few ingredients.     Follow up in 2-3 months.    For follow up appointments, please call [phone].     Thank you,     Cheyenne Lentz, MS, RD, LD  "

## 2023-05-22 NOTE — NURSING NOTE
Is the patient currently in the state of [place]? YES    Visit mode:TELEPHONE    If the visit is dropped, the patient can be reconnected by: TELEPHONE VISIT: Phone number: [phone]    Will anyone else be joining the visit? NO      How would you like to obtain your AVS? MyChart    Are changes needed to the allergy or medication list? NO    Reason for visit: Consult

## 2023-05-27 ENCOUNTER — TELEPHONE (OUTPATIENT)
Dept: GASTROENTEROLOGY | Facility: CLINIC | Age: 23
End: 2023-05-27
Payer: COMMERCIAL

## 2023-05-27 NOTE — TELEPHONE ENCOUNTER
Called and spoke with patient about scheduling a follow up appointment with Cheyenne Lentz in 2 months (around 7/26/23). Pt stated she will call back at a later time to schedule. Pt has the # to call

## 2023-06-13 ENCOUNTER — OFFICE VISIT (OUTPATIENT)
Dept: FAMILY MEDICINE | Facility: CLINIC | Age: 23
End: 2023-06-13
Payer: COMMERCIAL

## 2023-06-13 VITALS
OXYGEN SATURATION: 97 % | HEART RATE: 84 BPM | HEIGHT: 65 IN | BODY MASS INDEX: 19.59 KG/M2 | DIASTOLIC BLOOD PRESSURE: 76 MMHG | WEIGHT: 117.6 LBS | SYSTOLIC BLOOD PRESSURE: 112 MMHG

## 2023-06-13 DIAGNOSIS — F41.0 PANIC ATTACK: ICD-10-CM

## 2023-06-13 DIAGNOSIS — K21.9 GASTROESOPHAGEAL REFLUX DISEASE WITHOUT ESOPHAGITIS: ICD-10-CM

## 2023-06-13 DIAGNOSIS — R63.0 POOR APPETITE: ICD-10-CM

## 2023-06-13 DIAGNOSIS — Z00.00 ROUTINE GENERAL MEDICAL EXAMINATION AT A HEALTH CARE FACILITY: Primary | ICD-10-CM

## 2023-06-13 DIAGNOSIS — Z11.59 NEED FOR HEPATITIS C SCREENING TEST: ICD-10-CM

## 2023-06-13 DIAGNOSIS — Z11.4 SCREENING FOR HIV (HUMAN IMMUNODEFICIENCY VIRUS): ICD-10-CM

## 2023-06-13 DIAGNOSIS — Z97.5 IUD (INTRAUTERINE DEVICE) IN PLACE: ICD-10-CM

## 2023-06-13 DIAGNOSIS — Z91.018 SOY ALLERGY: ICD-10-CM

## 2023-06-13 DIAGNOSIS — F41.1 GENERALIZED ANXIETY DISORDER: ICD-10-CM

## 2023-06-13 PROBLEM — S16.1XXA STRAIN OF NECK MUSCLE, INITIAL ENCOUNTER: Status: RESOLVED | Noted: 2019-09-30 | Resolved: 2023-06-13

## 2023-06-13 PROBLEM — E16.2 HYPOGLYCEMIA: Status: RESOLVED | Noted: 2022-02-13 | Resolved: 2023-06-13

## 2023-06-13 PROBLEM — R00.0 SINUS TACHYCARDIA: Status: RESOLVED | Noted: 2022-02-13 | Resolved: 2023-06-13

## 2023-06-13 PROBLEM — E87.29 HIGH ANION GAP METABOLIC ACIDOSIS: Status: RESOLVED | Noted: 2022-02-13 | Resolved: 2023-06-13

## 2023-06-13 PROBLEM — S39.012A STRAIN OF LUMBAR REGION, INITIAL ENCOUNTER: Status: RESOLVED | Noted: 2019-09-30 | Resolved: 2023-06-13

## 2023-06-13 LAB
HBA1C MFR BLD: 5.2 % (ref 0–5.6)
HGB BLD-MCNC: 13.2 G/DL (ref 11.7–15.7)

## 2023-06-13 PROCEDURE — 86803 HEPATITIS C AB TEST: CPT | Performed by: FAMILY MEDICINE

## 2023-06-13 PROCEDURE — 99214 OFFICE O/P EST MOD 30 MIN: CPT | Mod: 25 | Performed by: FAMILY MEDICINE

## 2023-06-13 PROCEDURE — 99395 PREV VISIT EST AGE 18-39: CPT | Performed by: FAMILY MEDICINE

## 2023-06-13 PROCEDURE — 83036 HEMOGLOBIN GLYCOSYLATED A1C: CPT | Performed by: FAMILY MEDICINE

## 2023-06-13 PROCEDURE — 80061 LIPID PANEL: CPT | Performed by: FAMILY MEDICINE

## 2023-06-13 PROCEDURE — 87389 HIV-1 AG W/HIV-1&-2 AB AG IA: CPT | Performed by: FAMILY MEDICINE

## 2023-06-13 PROCEDURE — 36415 COLL VENOUS BLD VENIPUNCTURE: CPT | Performed by: FAMILY MEDICINE

## 2023-06-13 PROCEDURE — 84443 ASSAY THYROID STIM HORMONE: CPT | Performed by: FAMILY MEDICINE

## 2023-06-13 PROCEDURE — 85018 HEMOGLOBIN: CPT | Performed by: FAMILY MEDICINE

## 2023-06-13 RX ORDER — LORAZEPAM 0.5 MG/1
.25-.5 TABLET ORAL DAILY PRN
Qty: 15 TABLET | Refills: 0 | Status: SHIPPED | OUTPATIENT
Start: 2023-06-13 | End: 2023-07-26

## 2023-06-13 ASSESSMENT — ANXIETY QUESTIONNAIRES
3. WORRYING TOO MUCH ABOUT DIFFERENT THINGS: MORE THAN HALF THE DAYS
IF YOU CHECKED OFF ANY PROBLEMS ON THIS QUESTIONNAIRE, HOW DIFFICULT HAVE THESE PROBLEMS MADE IT FOR YOU TO DO YOUR WORK, TAKE CARE OF THINGS AT HOME, OR GET ALONG WITH OTHER PEOPLE: VERY DIFFICULT
1. FEELING NERVOUS, ANXIOUS, OR ON EDGE: NEARLY EVERY DAY
6. BECOMING EASILY ANNOYED OR IRRITABLE: SEVERAL DAYS
2. NOT BEING ABLE TO STOP OR CONTROL WORRYING: NEARLY EVERY DAY
7. FEELING AFRAID AS IF SOMETHING AWFUL MIGHT HAPPEN: SEVERAL DAYS
GAD7 TOTAL SCORE: 13
GAD7 TOTAL SCORE: 13
5. BEING SO RESTLESS THAT IT IS HARD TO SIT STILL: SEVERAL DAYS

## 2023-06-13 ASSESSMENT — ENCOUNTER SYMPTOMS
HEADACHES: 0
PALPITATIONS: 0
COUGH: 0
HEMATOCHEZIA: 0
BREAST MASS: 0
ARTHRALGIAS: 0
FREQUENCY: 0
PARESTHESIAS: 0
CHILLS: 0
DIARRHEA: 0
SHORTNESS OF BREATH: 0
NAUSEA: 0
WEAKNESS: 0
MYALGIAS: 0
CONSTIPATION: 0
HEMATURIA: 0
ABDOMINAL PAIN: 0
EYE PAIN: 0
FEVER: 0
SORE THROAT: 0
JOINT SWELLING: 0
HEARTBURN: 0
NERVOUS/ANXIOUS: 1
DYSURIA: 0
DIZZINESS: 0

## 2023-06-13 ASSESSMENT — PATIENT HEALTH QUESTIONNAIRE - PHQ9
5. POOR APPETITE OR OVEREATING: MORE THAN HALF THE DAYS
SUM OF ALL RESPONSES TO PHQ QUESTIONS 1-9: 11

## 2023-06-13 NOTE — PROGRESS NOTES
"   SUBJECTIVE:   CC: Julita is an 22 year old who presents for preventive health visit.       6/13/2023     2:25 PM   Additional Questions   Roomed by Darcy PADGETT LPN     Healthy Habits:    Getting at least 3 servings of Calcium per day:  Yes    Bi-annual eye exam:  Yes    Dental care twice a year:  NO    Sleep apnea or symptoms of sleep apnea:  None    Diet:  Vegetarian/vegan    Frequency of exercise:  6-7 days/week    Duration of exercise:  15-30 minutes    Taking medications regularly:  Yes    Medication side effects:  None    PHQ-2 Total Score:    Additional concerns today:  No    Chief Complaint   Patient presents with     Physical     Not fasting, Sprained right ankle a few weeks ago but feels this is improving.       Her anxiety is still \"pretty bad\". Started therapy last week which did help a lot though. Working on self cares.   Feels that she wakes with panicky feelings.   Not sure the hydroxyzine is helpful- works in 30min but not when she is more panicky it doesn't work at all.   Using hydroxyzine 2-3 days a week (dosed about twice in those days 25mg twice)     She does smoke weed most mornings which helps both her anxiety and her nausea/acid reflux/appetite  She understands she has addictive tendencies and runs in the family so she has a supportive boyfriend who would likely help hold on to her meds if needed for anxiety.     Her nausea/stomach problems  Have really improved substantially (somewhat tied to use of weed which has helped her sx). She has been scared to take/start the Lexapro (has taken it in high school and didn't prefer it overall)      She has gained weight since last visit which was her goal.   Last weight about 105lbs, now 117lbs. She is looking to still add more muscle mass and looking forward to exercising again now that she was able to put on the weight.     Has met with a dietician given her soy allergy/poor appetite and GERD hx and following a vegetarian/vegan diet.     She feels she " making good headway- she is able to eat smaller meals throughout the day to avoid the fullness/nausea which helps.    2 breakfasts: yogurt and oatmeal/bagel option  Lunch: salad or pasta (sometimes both)  Dinner: baked potato, pizza, pasta      She had one day of rectal bleeding in March and went to the ER. ([facility]) no subsequent recurrence    Hx of upper GI bleed and using daily PPI     IUD in place    Wt Readings from Last 3 Encounters:   06/13/23 53.3 kg (117 lb 9.6 oz)   05/22/23 47.6 kg (105 lb)   04/07/23 50 kg (110 lb 3.2 oz)       Social History     Social History Narrative    [employer] in [place]. Taking a break now to save money and find her career path.    Living with her parents- Tian (cardiologist) & Marija    Stopped smoking weed when she went to college.    Nicotine in high school, otherwise not vaping or smoking    Works at [employer]         Social History     Tobacco Use     Smoking status: Never     Smokeless tobacco: Never   Vaping Use     Vaping status: Former     Substances: Nicotine, THC, Flavoring     Devices: Disposable, Pre-filled or refillable cartridge     Start date: 1/1/2020     Quit date: 5/1/2022     Passive vaping exposure: Yes   Substance Use Topics     Alcohol use: Not Currently             6/13/2023     2:16 PM   Alcohol Use   Prescreen: >3 drinks/day or >7 drinks/week? No          View : No data to display.              Reviewed orders with patient.  Reviewed health maintenance and updated orders accordingly - Yes    Breast Cancer Screening:        History of abnormal Pap smear: NO - age 21-29 PAP every 3 years recommended     Reviewed and updated as needed this visit by clinical staff   Tobacco  Allergies  Meds              Reviewed and updated as needed this visit by Provider                   Review of Systems   Constitutional: Negative for chills and fever.   HENT: Negative for congestion, ear pain, hearing loss and sore throat.    Eyes: Negative for  "pain and visual disturbance.   Respiratory: Negative for cough and shortness of breath.    Cardiovascular: Negative for chest pain, palpitations and peripheral edema.   Gastrointestinal: Negative for abdominal pain, constipation, diarrhea, heartburn, hematochezia and nausea.   Breasts:  Positive for tenderness. Negative for breast mass and discharge.   Genitourinary: Negative for dysuria, frequency, genital sores, hematuria, pelvic pain, urgency, vaginal bleeding and vaginal discharge.   Musculoskeletal: Negative for arthralgias, joint swelling and myalgias.   Skin: Negative for rash.   Neurological: Negative for dizziness, weakness, headaches and paresthesias.   Psychiatric/Behavioral: Negative for mood changes. The patient is nervous/anxious.         OBJECTIVE:   /76 (BP Location: Left arm, Patient Position: Sitting, Cuff Size: Adult Regular)   Pulse 84   Ht 1.64 m (5' 4.57\")   Wt 53.3 kg (117 lb 9.6 oz)   SpO2 97%   BMI 19.83 kg/m    Physical Exam    Diagnostic Test Results:  Labs reviewed in Epic    ASSESSMENT/PLAN:   Julita was seen today for physical.    Diagnoses and all orders for this visit:    Routine general medical examination at a health care facility  -     Hemoglobin A1c; Future  -     Hemoglobin; Future  -     Lipid panel reflex to direct LDL Non-fasting; Future  -     TSH with free T4 reflex; Future    IUD (intrauterine device) in place  From 2019    Generalized anxiety disorder  Reviewed first line treatments for mood with counseling and/or medication management. Side effect profile of first line selective serotonin reuptake inhibitor class of meds reviewed in detail and elected to start Zoloft. PRN agents for mood reviewed and will consider small pocket Rx for ativan for her panic attacks. She is using hydroxyzine prn for less severe anxiety a few times a week. . Mindfulness, healthy eating, and exercise reviewed as other was to manage mood symptoms.  Follow up within 2 months for " reassessment.    -     sertraline (ZOLOFT) 50 MG tablet; Take 1 tablet (50 mg) by mouth daily    Gastroesophageal reflux disease without esophagitis  Continues PPI    Poor appetite  Soy allergy  Weight gain has now occurred; doing better (has seen dietician and also using weed for anxiety/appetite). Discussed optimizing her anxiety management to hopefully wean down/off the low dose daily THC    Screening for HIV (human immunodeficiency virus)  -     HIV Antigen Antibody Combo; Future    Need for hepatitis C screening test  -     Hepatitis C Screen Reflex to HCV RNA Quant and Genotype; Future    Panic attack  -     LORazepam (ATIVAN) 0.5 MG tablet; Take 0.5-1 tablets (0.25-0.5 mg) by mouth daily as needed for anxiety    Other orders  -     PRIMARY CARE FOLLOW-UP SCHEDULING; Future        Patient has been advised of split billing requirements and indicates understanding: Yes      COUNSELING:      She reports that she has never smoked. She has never used smokeless tobacco.      Carolyn Latham MD  [facility]

## 2023-06-14 LAB
CHOLEST SERPL-MCNC: 177 MG/DL
HCV AB SERPL QL IA: NONREACTIVE
HDLC SERPL-MCNC: 92 MG/DL
HIV 1+2 AB+HIV1 P24 AG SERPL QL IA: NONREACTIVE
LDLC SERPL CALC-MCNC: 60 MG/DL
NONHDLC SERPL-MCNC: 85 MG/DL
TRIGL SERPL-MCNC: 124 MG/DL
TSH SERPL DL<=0.005 MIU/L-ACNC: 0.67 UIU/ML (ref 0.3–4.2)

## 2023-06-22 ENCOUNTER — NURSE TRIAGE (OUTPATIENT)
Dept: NURSING | Facility: CLINIC | Age: 23
End: 2023-06-22
Payer: COMMERCIAL

## 2023-06-23 NOTE — TELEPHONE ENCOUNTER
Patient calling about taking trazodone 50 mg (unprescibed) with her ativan. Triage advised that she not take them together. Patient verbalized understanding of care advice.    Gayle Virk RN on 6/22/2023 at 10:59 PM

## 2023-06-26 ENCOUNTER — TRANSFERRED RECORDS (OUTPATIENT)
Dept: HEALTH INFORMATION MANAGEMENT | Facility: CLINIC | Age: 23
End: 2023-06-26
Payer: COMMERCIAL

## 2023-06-29 ENCOUNTER — TRANSFERRED RECORDS (OUTPATIENT)
Dept: HEALTH INFORMATION MANAGEMENT | Facility: CLINIC | Age: 23
End: 2023-06-29
Payer: COMMERCIAL

## 2023-07-03 ENCOUNTER — HOSPITAL ENCOUNTER (EMERGENCY)
Facility: HOSPITAL | Age: 23
Discharge: HOME | End: 2023-07-03
Attending: EMERGENCY MEDICINE | Admitting: EMERGENCY MEDICINE
Payer: COMMERCIAL

## 2023-07-03 ENCOUNTER — APPOINTMENT (OUTPATIENT)
Dept: CT IMAGING | Facility: HOSPITAL | Age: 23
End: 2023-07-03
Attending: EMERGENCY MEDICINE
Payer: COMMERCIAL

## 2023-07-03 VITALS
SYSTOLIC BLOOD PRESSURE: 121 MMHG | OXYGEN SATURATION: 99 % | TEMPERATURE: 97.1 F | WEIGHT: 110 LBS | BODY MASS INDEX: 18.78 KG/M2 | DIASTOLIC BLOOD PRESSURE: 69 MMHG | HEIGHT: 64 IN | RESPIRATION RATE: 16 BRPM | HEART RATE: 94 BPM

## 2023-07-03 DIAGNOSIS — R11.2 NAUSEA AND VOMITING, UNSPECIFIED VOMITING TYPE: ICD-10-CM

## 2023-07-03 LAB
ALBUMIN SERPL BCG-MCNC: 4.9 G/DL (ref 3.5–5.2)
ALBUMIN UR-MCNC: 30 MG/DL
ALP SERPL-CCNC: 106 U/L (ref 35–104)
ALT SERPL W P-5'-P-CCNC: 11 U/L (ref 0–50)
ANION GAP SERPL CALCULATED.3IONS-SCNC: 16 MMOL/L (ref 7–15)
ANION GAP SERPL CALCULATED.3IONS-SCNC: 28 MMOL/L (ref 7–15)
APPEARANCE UR: ABNORMAL
AST SERPL W P-5'-P-CCNC: 38 U/L (ref 0–45)
BACTERIA #/AREA URNS HPF: ABNORMAL /HPF
BASOPHILS # BLD AUTO: 0.1 10E3/UL (ref 0–0.2)
BASOPHILS NFR BLD AUTO: 0 %
BILIRUB SERPL-MCNC: 0.3 MG/DL
BILIRUB UR QL STRIP: NEGATIVE
BUN SERPL-MCNC: 10.6 MG/DL (ref 6–20)
BUN SERPL-MCNC: 13.9 MG/DL (ref 6–20)
CALCIUM SERPL-MCNC: 8.3 MG/DL (ref 8.6–10)
CALCIUM SERPL-MCNC: 9.1 MG/DL (ref 8.6–10)
CHLORIDE SERPL-SCNC: 102 MMOL/L (ref 98–107)
CHLORIDE SERPL-SCNC: 99 MMOL/L (ref 98–107)
COLOR UR AUTO: YELLOW
CREAT SERPL-MCNC: 0.7 MG/DL (ref 0.51–0.95)
CREAT SERPL-MCNC: 0.85 MG/DL (ref 0.51–0.95)
DEPRECATED HCO3 PLAS-SCNC: 17 MMOL/L (ref 22–29)
DEPRECATED HCO3 PLAS-SCNC: 20 MMOL/L (ref 22–29)
EOSINOPHIL # BLD AUTO: 0 10E3/UL (ref 0–0.7)
EOSINOPHIL NFR BLD AUTO: 0 %
ERYTHROCYTE [DISTWIDTH] IN BLOOD BY AUTOMATED COUNT: 12.7 % (ref 10–15)
GFR SERPL CREATININE-BSD FRML MDRD: >90 ML/MIN/1.73M2
GFR SERPL CREATININE-BSD FRML MDRD: >90 ML/MIN/1.73M2
GLUCOSE SERPL-MCNC: 188 MG/DL (ref 70–99)
GLUCOSE SERPL-MCNC: 71 MG/DL (ref 70–99)
GLUCOSE UR STRIP-MCNC: NEGATIVE MG/DL
HCG SERPL QL: NEGATIVE
HCT VFR BLD AUTO: 41.8 % (ref 35–47)
HGB BLD-MCNC: 13.6 G/DL (ref 11.7–15.7)
HGB UR QL STRIP: NEGATIVE
IMM GRANULOCYTES # BLD: 0.1 10E3/UL
IMM GRANULOCYTES NFR BLD: 0 %
KETONES UR STRIP-MCNC: 60 MG/DL
LEUKOCYTE ESTERASE UR QL STRIP: NEGATIVE
LIPASE SERPL-CCNC: 23 U/L (ref 13–60)
LYMPHOCYTES # BLD AUTO: 3.1 10E3/UL (ref 0.8–5.3)
LYMPHOCYTES NFR BLD AUTO: 15 %
MAGNESIUM SERPL-MCNC: 2.2 MG/DL (ref 1.7–2.3)
MCH RBC QN AUTO: 31.3 PG (ref 26.5–33)
MCHC RBC AUTO-ENTMCNC: 32.5 G/DL (ref 31.5–36.5)
MCV RBC AUTO: 96 FL (ref 78–100)
MONOCYTES # BLD AUTO: 1 10E3/UL (ref 0–1.3)
MONOCYTES NFR BLD AUTO: 5 %
MUCOUS THREADS #/AREA URNS LPF: PRESENT /LPF
NEUTROPHILS # BLD AUTO: 16.1 10E3/UL (ref 1.6–8.3)
NEUTROPHILS NFR BLD AUTO: 80 %
NITRATE UR QL: POSITIVE
NRBC # BLD AUTO: 0 10E3/UL
NRBC BLD AUTO-RTO: 0 /100
PH UR STRIP: 5.5 [PH] (ref 5–7)
PLATELET # BLD AUTO: 359 10E3/UL (ref 150–450)
POTASSIUM SERPL-SCNC: 3.3 MMOL/L (ref 3.4–5.3)
POTASSIUM SERPL-SCNC: 3.4 MMOL/L (ref 3.4–5.3)
PROT SERPL-MCNC: 7.8 G/DL (ref 6.4–8.3)
RBC # BLD AUTO: 4.34 10E6/UL (ref 3.8–5.2)
RBC URINE: 2 /HPF
SODIUM SERPL-SCNC: 138 MMOL/L (ref 136–145)
SODIUM SERPL-SCNC: 144 MMOL/L (ref 136–145)
SP GR UR STRIP: 1.02 (ref 1–1.03)
SQUAMOUS EPITHELIAL: 15 /HPF
UROBILINOGEN UR STRIP-MCNC: <2 MG/DL
WBC # BLD AUTO: 20.3 10E3/UL (ref 4–11)
WBC URINE: 3 /HPF

## 2023-07-03 PROCEDURE — 85025 COMPLETE CBC W/AUTO DIFF WBC: CPT | Performed by: EMERGENCY MEDICINE

## 2023-07-03 PROCEDURE — 84703 CHORIONIC GONADOTROPIN ASSAY: CPT | Performed by: EMERGENCY MEDICINE

## 2023-07-03 PROCEDURE — 99285 EMERGENCY DEPT VISIT HI MDM: CPT | Mod: 25

## 2023-07-03 PROCEDURE — 250N000011 HC RX IP 250 OP 636: Mod: JZ | Performed by: EMERGENCY MEDICINE

## 2023-07-03 PROCEDURE — 96376 TX/PRO/DX INJ SAME DRUG ADON: CPT

## 2023-07-03 PROCEDURE — 258N000003 HC RX IP 258 OP 636: Performed by: EMERGENCY MEDICINE

## 2023-07-03 PROCEDURE — 87186 SC STD MICRODIL/AGAR DIL: CPT | Performed by: EMERGENCY MEDICINE

## 2023-07-03 PROCEDURE — 96374 THER/PROPH/DIAG INJ IV PUSH: CPT | Mod: 59

## 2023-07-03 PROCEDURE — 36415 COLL VENOUS BLD VENIPUNCTURE: CPT | Performed by: EMERGENCY MEDICINE

## 2023-07-03 PROCEDURE — 81001 URINALYSIS AUTO W/SCOPE: CPT | Performed by: EMERGENCY MEDICINE

## 2023-07-03 PROCEDURE — 74177 CT ABD & PELVIS W/CONTRAST: CPT

## 2023-07-03 PROCEDURE — 83690 ASSAY OF LIPASE: CPT | Performed by: EMERGENCY MEDICINE

## 2023-07-03 PROCEDURE — 83735 ASSAY OF MAGNESIUM: CPT | Performed by: EMERGENCY MEDICINE

## 2023-07-03 PROCEDURE — 80053 COMPREHEN METABOLIC PANEL: CPT | Performed by: EMERGENCY MEDICINE

## 2023-07-03 PROCEDURE — 96361 HYDRATE IV INFUSION ADD-ON: CPT

## 2023-07-03 PROCEDURE — 96375 TX/PRO/DX INJ NEW DRUG ADDON: CPT

## 2023-07-03 RX ORDER — IOPAMIDOL 755 MG/ML
90 INJECTION, SOLUTION INTRAVASCULAR ONCE
Status: COMPLETED | OUTPATIENT
Start: 2023-07-03 | End: 2023-07-03

## 2023-07-03 RX ORDER — ONDANSETRON 2 MG/ML
4 INJECTION INTRAMUSCULAR; INTRAVENOUS ONCE
Status: COMPLETED | OUTPATIENT
Start: 2023-07-03 | End: 2023-07-03

## 2023-07-03 RX ORDER — ONDANSETRON 4 MG/1
4 TABLET, ORALLY DISINTEGRATING ORAL EVERY 8 HOURS PRN
Qty: 10 TABLET | Refills: 0 | Status: SHIPPED | OUTPATIENT
Start: 2023-07-03 | End: 2023-07-06

## 2023-07-03 RX ADMIN — FAMOTIDINE 20 MG: 10 INJECTION, SOLUTION INTRAVENOUS at 09:52

## 2023-07-03 RX ADMIN — DEXTROSE AND SODIUM CHLORIDE: 5; 450 INJECTION, SOLUTION INTRAVENOUS at 09:47

## 2023-07-03 RX ADMIN — ONDANSETRON 4 MG: 2 INJECTION INTRAMUSCULAR; INTRAVENOUS at 12:51

## 2023-07-03 RX ADMIN — ONDANSETRON 4 MG: 2 INJECTION INTRAMUSCULAR; INTRAVENOUS at 08:57

## 2023-07-03 RX ADMIN — IOPAMIDOL 90 ML: 755 INJECTION, SOLUTION INTRAVENOUS at 10:33

## 2023-07-03 RX ADMIN — SODIUM CHLORIDE 1000 ML: 9 INJECTION, SOLUTION INTRAVENOUS at 08:52

## 2023-07-03 ASSESSMENT — ACTIVITIES OF DAILY LIVING (ADL)
ADLS_ACUITY_SCORE: 35

## 2023-07-03 NOTE — ED TRIAGE NOTES
Patient presents to ED for evaluation of emesis and abdominal pain since last night.     Triage Assessment     Row Name 07/03/23 0829       Triage Assessment (Adult)    Airway WDL WDL       Respiratory WDL    Respiratory WDL WDL       Skin Circulation/Temperature WDL    Skin Circulation/Temperature WDL WDL       Cardiac WDL    Cardiac WDL WDL       Peripheral/Neurovascular WDL    Peripheral Neurovascular WDL WDL       Cognitive/Neuro/Behavioral WDL    Cognitive/Neuro/Behavioral WDL WDL

## 2023-07-03 NOTE — ED NOTES
Patient reports that her pain and nausea are no longer present. Interventions effective per patient report.

## 2023-07-03 NOTE — ED NOTES
Patient has been able to tolerate juice, a fruit cup, and crackers for a PO challenge. Provider updated.

## 2023-07-03 NOTE — ED PROVIDER NOTES
EMERGENCY DEPARTMENT ENCOUNTER      NAME: Julita Fernandez  AGE: 22 year old female  YOB: 2000  MRN: [id number]  EVALUATION DATE & TIME: 7/3/2023  8:30 AM    PCP: Carolyn Latham    ED PROVIDER: Erma Khalil MD      Chief Complaint   Patient presents with     Emesis     Abdominal Pain         FINAL IMPRESSION:  1. Nausea and vomiting, unspecified vomiting type          ED COURSE & MEDICAL DECISION MAKING:    Pertinent Labs & Imaging studies reviewed. (See chart for details)    8:37 AM I introduced myself to the patient, obtained patient history, performed a physical exam, and discussed plan for ED workup including potential diagnostic laboratory/imaging studies and interventions.    22 year old female with history of anxiety, GERD, alcohol use (denies daily use), marijuana use, and prior similar presentations to the ER who presents to the Emergency Department for evaluation of nausea and vomiting. On exam, patient appears nauseated but is otherwise in no acute distress. Initially mildly tachycardic which improved with IV fluids. She has mild epigastric tenderness but no signs of peritonitis. Differential diagnosis includes but is not limited to gastroenteritis, bowel obstruction, pancreatitis, anxiety reaction, GERD, ulcer, less likely gallbladder pathology as she has no right upper quadrant tenderness and negative Molina sign, dehydration, pregnancy, marijuana induced hyperemesis, etc.  Laboratory studies obtained.  Initially discussed with the patient symptomatic treatment and reassessment and holding on imaging studies which she was in agreement with.  She states she has had similar presentations in the past related to her anxiety.  Does not appear to have any signs of alcohol withdrawal at this time and has not drank for least a week or 2 and is not drinking daily thus felt this would be very unlikely.  Did discuss the possibility of marijuana causing her symptoms as well.  She was  given IV Zofran as well as a liter of IV fluids and IV Pepcid.  CMP reveals a slightly low potassium of 3.3, this is only very slightly low and do feel that with diet this will be replaced so once she is able to tolerate p.o. do feel this will be corrected easily without additional supplementation.  Her anion gap is elevated at 28 with a bicarb of 17.  Do feel this is likely related to starvation ketosis from the repetitive vomiting and the fact that she has not eaten anything for over 24 hours.  She denies ingesting any toxic alcohols.  Creatinine within normal limits.  Liver function within normal limits.  Lipase within normal limits making pancreatitis unlikely.  Does have a white blood cell count of 20 and this has been previously elevated to around 16 in the past when she has had similar presentations.  This may be from vomiting with a stress demargination but also considered infectious etiology.  With this leukocytosis will obtain CT of the abdomen and pelvis with contrast with the patient was in agreement with.  Again felt that gallbladder pathology would be very unlikely without right upper quadrant tenderness and this would also be evaluated on CT.  Hemoglobin 13.6.  She has not had any significant signs of GI bleeding.  That there may be a couple streaks of blood in her last vomit which may be Irma-Ro tear.  With a normal hemoglobin felt that significant GI bleed was unlikely.  Not having any melena or hematochezia.  Magnesium within normal limits.  Pregnancy test negative.  Urinalysis reveals ketones.  She has positive nitrite but only 3 white blood cells and 2 red blood cells and negative leukocyte esterase with 15 squamous cells.  Do feel this is likely contaminated.  She denies having any urinary symptoms when I discussed with her.  No CVA tenderness and thus felt that pyelonephritis would be unlikely.  As she has no urinary symptoms discussed with the patient that we will obtain a urine culture  and would not empirically treat as she is asymptomatic unless this grows bacteria and we could contact her for treatment at that time.  She was in agreement with this plan.  She was given another dose of Zofran here.  With the anion gap acidosis we did also give her a liter of D5 half-normal saline and rechecked her BMP.  With the recheck of the BMP her anion gap is now much improved to 16 with a bicarb of 20.  Glucose is 188 with the dextrose.  Previous glucose without this was 71.  CT of the abdomen and pelvis Reveals that most of the colon is collapsed with minimal stool in the rectosigmoid.  Otherwise no abnormalities noted to explain the symptoms.  No bowel obstruction.  No inflammatory changes.  No sign of appendicitis.  Kidneys are normal other than a punctate nonobstructing calculus in the lower pole of the left kidney.  No ureteral lithiasis.  Gallbladder is normal.    On reevaluation, the patient is feeling much improved and was able to tolerate food and drink here without difficulty or any return of vomiting.  She was comfortable with discharge home at this time.  Discussed we do not know the exact etiology and thus it is very important for her to return to the ER and we discussed strict return precautions.  She was advised to follow-up with her primary care doctor later this week if not feeling improved.  Recommended she stay well-hydrated and we also recommended Gatorade with electrolytes to assist in this.  She was advised to slowly advance her diet.  She was in agreement with this plan and discharge.  She was discharged home in stable condition.         At the conclusion of the encounter I discussed the results of all of the tests and the disposition. The questions were answered. The patient or family acknowledged understanding and was agreeable with the care plan.       Medical Decision Making    History:    Supplemental history from: Documented in chart, if applicable    External Record(s) reviewed:  Documented in chart, if applicable. and Outpatient Record: 03/29/23 ED Visit    Work Up:    Chart documentation includes differential considered and any EKGs or imaging independently interpreted by provider.    In additional to work up documented, I considered the following work up: Documented in chart, if applicable.    External consultation:    Discussion of management with another provider: Documented in chart, if applicable    Complicating factors:    Care impacted by chronic illness: Mental Health and Other: Gastroesophageal reflux disease, alcohol abuse    Care affected by social determinants of health: N/A    Disposition considerations: Discharge. I prescribed additional prescription strength medication(s) as charted. See documentation for any additional details.      MEDICATIONS GIVEN IN THE EMERGENCY:  Medications   0.9% sodium chloride BOLUS (1,000 mLs Intravenous Not Given 7/3/23 1018)   ondansetron (ZOFRAN) injection 4 mg (4 mg Intravenous $Given 7/3/23 0857)   famotidine (PEPCID) injection 20 mg (20 mg Intravenous $Given 7/3/23 0952)   dextrose 5% and 0.45% NaCl infusion (0 mLs Intravenous Stopped 7/3/23 1127)   iopamidol (ISOVUE-370) solution 90 mL (90 mLs Intravenous $Given 7/3/23 1033)   ondansetron (ZOFRAN) injection 4 mg (4 mg Intravenous $Given 7/3/23 1251)       NEW PRESCRIPTIONS STARTED AT TODAY'S ER VISIT  Discharge Medication List as of 7/3/2023  1:58 PM      START taking these medications    Details   ondansetron (ZOFRAN ODT) 4 MG ODT tab Take 1 tablet (4 mg) by mouth every 8 hours as needed for nausea or vomiting, Disp-10 tablet, R-0, E-Prescribe                =================================================================    HPI    Patient information was obtained from: the patient     Use of : N/A     Julita Fernandez is a 22 year old female with a pertinent history of gastroesophageal reflux disease and generalized anxiety disorder who presents to this ED for evaluation of  "nausea and vomiting. The patient states that she vomited at 1:00 AM today, 07/03 and was unable to stop. Afterwards, she went to sleep but woke up again at 5:00 AM and kept vomiting. She has been dehydrated and says that she did not drink water or eat on the day of 07/02 due to her anxiety. She notes that her anxiety causes her to have an upset stomach, hence why she did not eat or drink. She did not feel well in general during the day on 07/02, stating that her skin felt hot, she was lightheaded and felt like fainting and said \"it was hard to think\". Before and after vomiting she felt her throat \"burning\" and had intermittent abdominal pain. She thinks that there may have been blood in her vomit and is unsure of whether or not she has had a fever. Of note, she has experienced these symptoms before. The patient says that she has not drank alcohol in a while, with the last time being some time before this past week. She does endorse occasional use of marijuana as it helps with nausea resulting from her anxiety. She adds that smoking marijuana has never caused her to vomit. The patient did smoke some marijuana this morning but says that it didn't improve her symptoms. She denies having diarrhea or melena or hematochezia, with her last bowel movement being on 07/02 at around 5:00 PM.     The patient takes pantoprazole for her acid reflux but forgot to take it on 07/01. Additionally, she takes Ativan as needed for her anxiety with her last does being on 06/28 (she does not take this daily). She denies having prior abdominal surgery. She has an IUD and denies any chance of pregnancy. She further denies taking nausea medications, recently traveling or eating undercooked food.     Per chart review, the patient was seen on 03/29/23 at [facility] for evaluation of nausea and vomiting. Abdomen XR 2 demonstrated IUD in the pelvis, small volume of stool in the ascending colon and small amount of stool distally but no " evidence for fecal impaction or large stool volume. Labs demonstrated leukocytosis which was seen in the past when patient presented with gastrointestinal symptoms. Slight elevation in the anion gap with no acidosis, hepatic function panel was reassuring, lipase was normal, thyroid test was normal. Patient was stable for discharge, discussed liquid diet for 24 hours, would not attempt further treatment for constipation until she ate more normally. Patient was started on Reglan 10 mg three times daily and discharged    REVIEW OF SYSTEMS   Review of Systems   Pertinent positives and negatives are documented in the HPI. All other systems reviewed and are negative.      PAST MEDICAL HISTORY:  Past Medical History:   Diagnosis Date     Anxiety      Gastroesophageal reflux disease        PAST SURGICAL HISTORY:  History reviewed. No pertinent surgical history.        CURRENT MEDICATIONS:    hydrOXYzine (VISTARIL) 25 MG capsule  levonorgestrel (MIRENA) 20 mcg/24 hours (5 yrs) 52 mg IUD  LORazepam (ATIVAN) 0.5 MG tablet  nitroFURantoin macrocrystal-monohydrate (MACROBID) 100 MG capsule  pantoprazole (PROTONIX) 40 MG EC tablet  sertraline (ZOLOFT) 50 MG tablet        ALLERGIES:  Allergies   Allergen Reactions     Soy [Isoflavones (Soy)] Other (See Comments)     Facial eczema        FAMILY HISTORY:  History reviewed. No pertinent family history.    SOCIAL HISTORY:   Social History     Socioeconomic History     Marital status: Single     Number of children: 0     Years of education: 13     Highest education level: High school graduate   Occupational History     Occupation: Student   Tobacco Use     Smoking status: Never     Smokeless tobacco: Never   Vaping Use     Vaping Use: Former     Start date: 1/1/2020     Quit date: 5/1/2022     Substances: Nicotine, THC, Flavoring     Devices: Disposable, Pre-filled or refillable cartridge   Substance and Sexual Activity     Alcohol use: Not Currently     Drug use: Yes     Frequency:  "4.0 times per week     Types: Marijuana     Sexual activity: Yes     Partners: Male     Birth control/protection: I.U.D.   Social History Narrative    [employer] in [place]. Taking a break now to save money and find her career path. 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; thinking about going to [employer] after [employer] for [employer]    Living with her parents- Tian (cardiologist) & Marijaaneesh Watson in high school, otherwise not vaping or smoking    Rare use of alcohol    Using THC almost daily in the mornings    Carolyn Latham MD           VITALS:  /69   Pulse 94   Temp 97.1  F (36.2  C) (Temporal)   Resp 16   Ht 1.626 m (5' 4\")   Wt 49.9 kg (110 lb)   SpO2 99%   BMI 18.88 kg/m      PHYSICAL EXAM    Physical Exam  Constitutional: Well developed, Well nourished, NAD, GCS 15  HENT: Normocephalic, Atraumatic, Bilateral external ears normal, Oropharynx normal, mucous membranes dry, Nose normal. Neck- Normal range of motion, No tenderness, Supple, No stridor.  Eyes: PERRL, EOMI, Conjunctiva normal, No discharge.   Respiratory: Normal breath sounds, No respiratory distress, No wheezing or crackles, Speaks in full sentences easily.   Cardiovascular: Normal heart rate, Regular rhythm, No murmurs, No rubs, No gallops. 2+ radial pulses bilaterally  GI: Bowel sounds normal, Soft, Mild epigastric tenderness, no right upper or right lower quadrant tenderness. Negative mayers's sign. No masses, No rebound or guarding. No CVA tenderness bilaterally.  Musculoskeletal: 2+ DP pulses. No notable lower extremity edema. Good range of motion in all major joints. No tenderness to palpation or major deformities noted. .    Integument: Warm, Dry, No erythema, No rash. No petechiae.  Neurologic: Alert & oriented x 3, 5/5 strength in all 4 extremities bilaterally. Sensation intact to light touch in all 4 extremities and the face bilaterally. No focal deficits noted.   Psychiatric: " Cooperative.      LAB:  All pertinent labs reviewed and interpreted.  Results for orders placed or performed during the hospital encounter of 07/03/23   CT Abdomen Pelvis w Contrast    Impression    IMPRESSION:   1.  Most of the colon is collapsed with minimal stool in the rectosigmoid. Query any diarrhea to suggest an enteritis?  2.  Otherwise, no other abnormalities noted to explain symptoms. Specifically, no inflammatory changes or bowel obstruction.  3.  There is a punctate nonobstructing calculus in the lower pole of the left kidney.   Comprehensive metabolic panel   Result Value Ref Range    Sodium 144 136 - 145 mmol/L    Potassium 3.3 (L) 3.4 - 5.3 mmol/L    Chloride 99 98 - 107 mmol/L    Carbon Dioxide (CO2) 17 (L) 22 - 29 mmol/L    Anion Gap 28 (H) 7 - 15 mmol/L    Urea Nitrogen 13.9 6.0 - 20.0 mg/dL    Creatinine 0.85 0.51 - 0.95 mg/dL    Calcium 9.1 8.6 - 10.0 mg/dL    Glucose 71 70 - 99 mg/dL    Alkaline Phosphatase 106 (H) 35 - 104 U/L    AST 38 0 - 45 U/L    ALT 11 0 - 50 U/L    Protein Total 7.8 6.4 - 8.3 g/dL    Albumin 4.9 3.5 - 5.2 g/dL    Bilirubin Total 0.3 <=1.2 mg/dL    GFR Estimate >90 >60 mL/min/1.73m2   Result Value Ref Range    Lipase 23 13 - 60 U/L   UA with Microscopic reflex to Culture    Specimen: Urine, Clean Catch   Result Value Ref Range    Color Urine Yellow Colorless, Straw, Light Yellow, Yellow    Appearance Urine Turbid (A) Clear    Glucose Urine Negative Negative mg/dL    Bilirubin Urine Negative Negative    Ketones Urine 60 (A) Negative mg/dL    Specific Gravity Urine 1.024 1.001 - 1.030    Blood Urine Negative Negative    pH Urine 5.5 5.0 - 7.0    Protein Albumin Urine 30 (A) Negative mg/dL    Urobilinogen Urine <2.0 <2.0 mg/dL    Nitrite Urine Positive (A) Negative    Leukocyte Esterase Urine Negative Negative    Bacteria Urine Few (A) None Seen /HPF    Mucus Urine Present (A) None Seen /LPF    RBC Urine 2 <=2 /HPF    WBC Urine 3 <=5 /HPF    Squamous Epithelials Urine 15 (H)  <=1 /HPF   HCG qualitative Blood   Result Value Ref Range    hCG Serum Qualitative Negative Negative   Result Value Ref Range    Magnesium 2.2 1.7 - 2.3 mg/dL   CBC with platelets and differential   Result Value Ref Range    WBC Count 20.3 (H) 4.0 - 11.0 10e3/uL    RBC Count 4.34 3.80 - 5.20 10e6/uL    Hemoglobin 13.6 11.7 - 15.7 g/dL    Hematocrit 41.8 35.0 - 47.0 %    MCV 96 78 - 100 fL    MCH 31.3 26.5 - 33.0 pg    MCHC 32.5 31.5 - 36.5 g/dL    RDW 12.7 10.0 - 15.0 %    Platelet Count 359 150 - 450 10e3/uL    % Neutrophils 80 %    % Lymphocytes 15 %    % Monocytes 5 %    % Eosinophils 0 %    % Basophils 0 %    % Immature Granulocytes 0 %    NRBCs per 100 WBC 0 <1 /100    Absolute Neutrophils 16.1 (H) 1.6 - 8.3 10e3/uL    Absolute Lymphocytes 3.1 0.8 - 5.3 10e3/uL    Absolute Monocytes 1.0 0.0 - 1.3 10e3/uL    Absolute Eosinophils 0.0 0.0 - 0.7 10e3/uL    Absolute Basophils 0.1 0.0 - 0.2 10e3/uL    Absolute Immature Granulocytes 0.1 <=0.4 10e3/uL    Absolute NRBCs 0.0 10e3/uL   Basic metabolic panel   Result Value Ref Range    Sodium 138 136 - 145 mmol/L    Potassium 3.4 3.4 - 5.3 mmol/L    Chloride 102 98 - 107 mmol/L    Carbon Dioxide (CO2) 20 (L) 22 - 29 mmol/L    Anion Gap 16 (H) 7 - 15 mmol/L    Urea Nitrogen 10.6 6.0 - 20.0 mg/dL    Creatinine 0.70 0.51 - 0.95 mg/dL    Calcium 8.3 (L) 8.6 - 10.0 mg/dL    Glucose 188 (H) 70 - 99 mg/dL    GFR Estimate >90 >60 mL/min/1.73m2   Urine Culture    Specimen: Urine, Clean Catch   Result Value Ref Range    Culture >100,000 CFU/mL Escherichia coli (A)     Culture <10,000 CFU/mL Urogenital mikaela        Susceptibility    Escherichia coli - ELIZABETH     Ampicillin  Susceptible ug/mL     Ampicillin/ Sulbactam  Susceptible ug/mL     Piperacillin/Tazobactam  Susceptible ug/mL     Cefazolin*  Susceptible ug/mL      * Cefazolin ELIZABETH breakpoints are for the treatment of uncomplicated urinary tract infections. For the treatment of systemic infections, please contact the laboratory for  additional testing.     Cefoxitin  Susceptible ug/mL     Ceftazidime  Susceptible ug/mL     Ceftriaxone  Susceptible ug/mL     Cefepime  Susceptible ug/mL     Gentamicin  Susceptible ug/mL     Tobramycin  Susceptible ug/mL     Ciprofloxacin  Susceptible ug/mL     Levofloxacin  Susceptible ug/mL     Nitrofurantoin  Susceptible ug/mL     Trimethoprim/Sulfamethoxazole  Susceptible ug/mL       RADIOLOGY:  Reviewed all pertinent imaging. Please see official radiology report.  CT Abdomen Pelvis w Contrast   Final Result   IMPRESSION:    1.  Most of the colon is collapsed with minimal stool in the rectosigmoid. Query any diarrhea to suggest an enteritis?   2.  Otherwise, no other abnormalities noted to explain symptoms. Specifically, no inflammatory changes or bowel obstruction.   3.  There is a punctate nonobstructing calculus in the lower pole of the left kidney.            [facility] Documentation:   CMS Diagnoses:               I, Ronna Robbsilvestre, am serving as a scribe to document services personally performed by Erma Khalil MD based on my observation and the provider's statements to me. I, Erma Khalil MD, attest that Ronna Kevin is acting in a scribe capacity, has observed my performance of the services and has documented them in accordance with my direction.    Erma Khalil MD  [facility] EMERGENCY DEPARTMENT  [facility] [facility] [id number] [phone]     Erma Khalil MD  07/21/23 6441

## 2023-07-03 NOTE — DISCHARGE INSTRUCTIONS
Follow-up with your primary care doctor later this week if you are not feeling improved.  Take the Zofran as needed for nausea.  Stay well-hydrated.  Recommend Gatorade with electrolytes to help you stay hydrated.  Slowly advance your diet and eat small meals.    Return to the ER for any new or worsening concerns.

## 2023-07-04 LAB
BACTERIA UR CULT: ABNORMAL
BACTERIA UR CULT: ABNORMAL

## 2023-07-04 RX ORDER — CEFDINIR 300 MG/1
300 CAPSULE ORAL 2 TIMES DAILY
Qty: 20 CAPSULE | Refills: 0 | Status: SHIPPED | OUTPATIENT
Start: 2023-07-04 | End: 2023-07-14

## 2023-07-26 DIAGNOSIS — F41.0 PANIC ATTACK: ICD-10-CM

## 2023-07-26 RX ORDER — LORAZEPAM 0.5 MG/1
.25-.5 TABLET ORAL DAILY PRN
Qty: 15 TABLET | Refills: 0 | Status: SHIPPED | OUTPATIENT
Start: 2023-07-26 | End: 2023-08-15

## 2023-08-14 ENCOUNTER — APPOINTMENT (OUTPATIENT)
Dept: CT IMAGING | Facility: HOSPITAL | Age: 23
End: 2023-08-14
Attending: EMERGENCY MEDICINE
Payer: COMMERCIAL

## 2023-08-14 ENCOUNTER — NURSE TRIAGE (OUTPATIENT)
Dept: NURSING | Facility: CLINIC | Age: 23
End: 2023-08-14
Payer: COMMERCIAL

## 2023-08-14 LAB — HCG UR QL: NEGATIVE

## 2023-08-14 PROCEDURE — 72128 CT CHEST SPINE W/O DYE: CPT

## 2023-08-14 PROCEDURE — 70450 CT HEAD/BRAIN W/O DYE: CPT

## 2023-08-14 PROCEDURE — 93005 ELECTROCARDIOGRAM TRACING: CPT | Performed by: EMERGENCY MEDICINE

## 2023-08-14 PROCEDURE — 250N000013 HC RX MED GY IP 250 OP 250 PS 637: Performed by: EMERGENCY MEDICINE

## 2023-08-14 PROCEDURE — 72131 CT LUMBAR SPINE W/O DYE: CPT

## 2023-08-14 PROCEDURE — 93005 ELECTROCARDIOGRAM TRACING: CPT | Performed by: STUDENT IN AN ORGANIZED HEALTH CARE EDUCATION/TRAINING PROGRAM

## 2023-08-14 PROCEDURE — 93005 ELECTROCARDIOGRAM TRACING: CPT

## 2023-08-14 PROCEDURE — 81025 URINE PREGNANCY TEST: CPT | Performed by: EMERGENCY MEDICINE

## 2023-08-14 PROCEDURE — 99285 EMERGENCY DEPT VISIT HI MDM: CPT | Mod: 25

## 2023-08-14 RX ORDER — FAMOTIDINE 10 MG
10 TABLET ORAL ONCE
Status: DISCONTINUED | OUTPATIENT
Start: 2023-08-14 | End: 2023-08-14

## 2023-08-14 RX ORDER — MAGNESIUM HYDROXIDE/ALUMINUM HYDROXICE/SIMETHICONE 120; 1200; 1200 MG/30ML; MG/30ML; MG/30ML
30 SUSPENSION ORAL ONCE
Status: DISCONTINUED | OUTPATIENT
Start: 2023-08-14 | End: 2023-08-14

## 2023-08-14 RX ORDER — LORAZEPAM 0.5 MG/1
1 TABLET ORAL ONCE
Status: COMPLETED | OUTPATIENT
Start: 2023-08-14 | End: 2023-08-14

## 2023-08-14 RX ORDER — ACETAMINOPHEN 325 MG/1
975 TABLET ORAL ONCE
Status: COMPLETED | OUTPATIENT
Start: 2023-08-14 | End: 2023-08-14

## 2023-08-14 RX ADMIN — LORAZEPAM 1 MG: 0.5 TABLET ORAL at 20:51

## 2023-08-14 RX ADMIN — ACETAMINOPHEN 975 MG: 325 TABLET ORAL at 20:51

## 2023-08-14 NOTE — ED TRIAGE NOTES
Amb to triage.  Pt states hx of anxiety.  Tried to go for a run yesterday about 0230 and started getting dizzy and then passed out.  Fiance was with pt at the time and witnessed the fall.  Pt hit head on concrete and R hand red from where tried to brace fall.  Pt reports has been vomiting all day and continued pain in R side of head.  Took 2 ativan this morning without relief.  Takes that prn for anxiety.  GCS-15.  Shandra.  Denies pain in c-spine.  C/o low back pain started this morning.       Triage Assessment       Row Name 08/14/23 8435       Triage Assessment (Adult)    Airway WDL WDL       Respiratory WDL    Respiratory WDL WDL       Skin Circulation/Temperature WDL    Skin Circulation/Temperature WDL WDL       Cardiac WDL    Cardiac WDL X;all    Pulse Rate & Regularity tachycardic       Peripheral/Neurovascular WDL    Peripheral Neurovascular WDL WDL       Cognitive/Neuro/Behavioral WDL    Cognitive/Neuro/Behavioral WDL WDL

## 2023-08-14 NOTE — TELEPHONE ENCOUNTER
Caller reports she was running yesterday , felt dizzy and then fell down and reports she was unresponsive per her boyfriend for 10 seconds; needed assistance to ambulate and came home and was out of it for the evening; unsure if she hit her head and has some occipital tenderness but denies falling backwards. Started vomiting  at 1100 today and hs a headache  Did not seek care per boyfriend; patient has  stopped answering questions and  is vomiting   Boyfriend is poor historian; patient is now present.   Asked to speak with patient again and boyfriend asks her to speak and then   call ended  [facility] to call back.and complete triage  Marianna Carrington RN  FNA          Additional Information   Negative: Shock suspected (e.g., cold/pale/clammy skin, too weak to stand, low BP, rapid pulse)   Negative: Difficult to awaken or acting confused (e.g., disoriented, slurred speech)   Negative: Sounds like a life-threatening emergency to the triager   Commented on: All Negative - Call  Now     Caller stopped nswering questins to vomit and then hung upa    Protocols used: Vomiting-A-AH

## 2023-08-15 ENCOUNTER — HOSPITAL ENCOUNTER (EMERGENCY)
Facility: HOSPITAL | Age: 23
Discharge: HOME | End: 2023-08-15
Attending: EMERGENCY MEDICINE | Admitting: EMERGENCY MEDICINE
Payer: COMMERCIAL

## 2023-08-15 VITALS
SYSTOLIC BLOOD PRESSURE: 125 MMHG | BODY MASS INDEX: 17.79 KG/M2 | WEIGHT: 106.8 LBS | TEMPERATURE: 98 F | DIASTOLIC BLOOD PRESSURE: 67 MMHG | RESPIRATION RATE: 16 BRPM | OXYGEN SATURATION: 100 % | HEIGHT: 65 IN | HEART RATE: 89 BPM

## 2023-08-15 DIAGNOSIS — K21.01 GASTROESOPHAGEAL REFLUX DISEASE WITH ESOPHAGITIS AND HEMORRHAGE: ICD-10-CM
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
## 2023-08-15 DIAGNOSIS — S09.90XA CLOSED HEAD INJURY, INITIAL ENCOUNTER: ICD-10-CM

## 2023-08-15 DIAGNOSIS — R11.2 NAUSEA AND VOMITING, UNSPECIFIED VOMITING TYPE: ICD-10-CM

## 2023-08-15 DIAGNOSIS — F41.0 PANIC ATTACK: ICD-10-CM

## 2023-08-15 DIAGNOSIS — K22.6 MALLORY-WEISS TEAR: ICD-10-CM

## 2023-08-15 LAB
ATRIAL RATE - MUSE: 105 BPM
DIASTOLIC BLOOD PRESSURE - MUSE: NORMAL MMHG
INTERPRETATION ECG - MUSE: NORMAL
P AXIS - MUSE: 88 DEGREES
PR INTERVAL - MUSE: 126 MS
QRS DURATION - MUSE: 80 MS
QT - MUSE: 348 MS
QTC - MUSE: 459 MS
R AXIS - MUSE: 93 DEGREES
SYSTOLIC BLOOD PRESSURE - MUSE: NORMAL MMHG
T AXIS - MUSE: 86 DEGREES
VENTRICULAR RATE- MUSE: 105 BPM

## 2023-08-15 RX ORDER — PANTOPRAZOLE SODIUM 40 MG/1
40 TABLET, DELAYED RELEASE ORAL DAILY
Qty: 90 TABLET | Refills: 1 | Status: SHIPPED | OUTPATIENT
Start: 2023-08-15 | End: 2024-02-07

## 2023-08-15 RX ORDER — LORAZEPAM 0.5 MG/1
.25-.5 TABLET ORAL DAILY PRN
Qty: 30 TABLET | Refills: 0 | Status: SHIPPED | OUTPATIENT
Start: 2023-08-15 | End: 2023-09-19

## 2023-08-15 NOTE — DISCHARGE INSTRUCTIONS
You were seen in the Emergency Department today for a headache and nausea after a fall.    Your scans today looked good. I think your nausea and symptoms are related to a concussion.     For Pain and/or Fever:  - You can take 600mg of Ibuprofen (Motrin, Advil) by mouth with food every 6-8 hours (no more than 3200mg in 24hrs).    - You may also take 650-1000mg of Acetaminophen (Tylenol) along with the Ibuprofen.  Please do not use more than 3000 mg in a 24 hour period. Tylenol is an effective drug when taken at the prescribed dosages but can cause bodily injury including liver damage if taken too often or at too high of dose.  - You can take one or the other every 3 hours while awake (such that each is taken every 6 hours). For example, if you take Tylenol when you get home then you would take ibuprofen 3 hours later followed by another dose of Tylenol 3 hours after that. Write down the times you are taking both medications to ensure appropriate time in between doses.     You can use the zofran you have at home for nausea and the ativan as needed for anxiety.        Please return to the ER if you experience worsening pain, inability to keep fluids or foods down, and/or for any other new or concerning symptoms, otherwise please follow up with your primary doctor in 5-7 days for recheck.     Below is some information you might find useful.     Thank you for choosing [facility]. It was a pleasure taking care of you today!  - Dr. Radha Smiley

## 2023-08-15 NOTE — ED PROVIDER NOTES
EMERGENCY DEPARTMENT ENCOUNTER      NAME: Julita Fernandez  YOB: 2000  MRN: [id number]    FINAL IMPRESSION  1. Nausea and vomiting, unspecified vomiting type    2. Closed head injury, initial encounter    3. Fall, initial encounter        MEDICAL DECISION MAKING   Pertinent Labs & Imaging studies reviewed. (See chart for details)    Julita Fernandez is a 22 year old female who presents for evaluation of a headache and back pain after an episode of syncope and collapse yesterday.  Patient reports that she has a long and well-documented history of anxiety and panic attacks.  Her mental health provider has advised that if she starts to experience symptoms of anxiety, 1 way to decrease her fight or flight responses to engage in strenuous physical activity.  She states that around 1:30 in the morning yesterday, she started to experience symptoms of a panic attack and went outside to go for a run.  She believes that she was hyperventilating and a bit out of breath, then became dizzy and lightheaded before passing out.  She states that she fell and hit the back/right side of her head on the ground and per boyfriend, was unresponsive for about 10 seconds.  She was able to get up and walk home but reports that this morning, she continued to experience pain in her head and mid back.  She also reports that she has been having nausea with recurrent episodes of emesis.  She believes that she did hit her right hand on the ground but is not having any significant pain in this area.  She did not sustain any other injuries with the fall and specifically denies pain in neck, arms, legs, chest, or abdomen.  She also denies dyspnea, numbness, tingling.  She does not recall experiencing any focal neurodeficits, palpitations, or chest pain prior to the episode early yesterday morning.  She is not on a blood thinner.  Patient does have a prescription for as needed Ativan which she states is typically helpful.  Remainder of  history and exam, as below.     I considered a broad differential including but not limited to syncope, mechanical fall, intracranial hemorrhage, skull fracture, spinal column fracture, postconcussive syndrome, muscular strain/sprain.  History and exam do seem most consistent with fall related to vasovagal episode or orthostatic hypotension in the setting of panic attack/anxiety. Patient denies any preceding palpitations, chest pain, dizziness or other symptoms to suggest arrhythmia, ACS, vestibular pathology, anemia, or other secondary cause. Patient is not on a blood thinner. Discussed options for workup and management with patient and agreed on plan for CT of head, lumbar, and thoracic spine and management of symptoms with trial of Ativan which has been helpful in the past.  An EKG was ordered on arrival in triage given report of syncope.  At this point, I see no indication for laboratory work-up and patient agrees.    EKG without evidence of arrhythmia or ischemia.  CT of head showed no evidence of fracture or hemorrhage.  CT of thoracic and lumbar spine also negative.  I rechecked the patient and reviewed these results.  She had improvement in anxiety symptoms after dose of Ativan and has not had any recurrent episodes of emesis.  Tachycardia noted on arrival has also resolved.  She felt reassured by results today.  We discussed potential etiology of symptoms and I explained that they are most likely related to postconcussive syndrome and muscular strain/sprain/contusion.  Patient felt comfortable with plan for continued conservative management of symptoms and follow-up with PCP and mental health providers.    Strict return precautions and follow up recommendations were discussed and all questions were answered. Patient endorsed understanding and was in agreement with plan.    I independently reviewed CT head (as noted above), formal radiologist read pending.      Medical Decision  Making    History:  Supplemental history from: Documented in chart, if applicable  External Record(s) reviewed: Documented in chart, if applicable.    Work Up:  Chart documentation includes differential considered and any EKGs or imaging independently interpreted by provider, where specified.  In additional to work up documented, I considered the following work up: Documented in chart, if applicable.    External consultation:  Discussion of management with another provider: Documented in chart, if applicable    Complicating factors:  Care impacted by chronic illness: Other: anxiety  Care affected by social determinants of health: Access to Medical Care    Disposition considerations: Discharge. I recommended the patient continue their current prescription strength medication(s): Ativan. See documentation for any additional details.          ED COURSE  8:35 PM Met with and introduced myself to the patient. Discussed history and plan of care.  11:43 PM Rechecked and updated patient on lab and imaging results. Discussed plan for discharge.        MEDICATIONS GIVEN IN THE ED  Medications   LORazepam (ATIVAN) tablet 1 mg (1 mg Oral $Given 8/14/23 2051)   acetaminophen (TYLENOL) tablet 975 mg (975 mg Oral $Given 8/14/23 2051)       NEW PRESCRIPTIONS STARTED AT TODAY'S VISIT  Discharge Medication List as of 8/14/2023 11:47 PM             =================================================================    Chief Complaint   Patient presents with    Fall         HPI:    Patient information was obtained from: Patient    Use of : N/A     Julita Fernandez is a 22 year old female who presents to the ED for evaluation of a head injury.    The patient reports that yesterday at 1 AM she went for a run when she suddenly became dizzy and lost consciouss. She fell and hit her head. Her boyfriend, who was with her at the time, stated that she was unreponsive for 10 seconds. During the fall she hit the back of her head. No  "other injuries noted. This morning she began vomiting and experiencing back pain.     Denies shortness of breath, numbness, chest pain, tingling, neck pain, arm and leg pain, or any other complaints at this time.     RELEVANT HISTORY, MEDICATIONS, & ALLERGIES   Past medical history, surgical history, family history, medications, and allergies reviewed and pertinent noted in HPI.    REVIEW OF SYSTEMS:  A complete review of systems was performed with pertinent positives and negatives noted in the HPI. All other systems negative.     PHYSICAL EXAM:    Vitals: /67   Pulse 89   Temp 98  F (36.7  C) (Oral)   Resp 16   Ht 1.651 m (5' 5\")   Wt 48.4 kg (106 lb 12.8 oz)   SpO2 100%   BMI 17.77 kg/m     General: Alert and interactive, comfortable appearing.  HENT: Full AROM of neck. Conjunctiva clear. Very mild tenderness to right parietal scalp. No other tenderness to scalp or facial bones. No malocclusion or epistaxis.   Neck: No tenderness over cervical spine.  Cardiovascular: Regular rhythm. Tachycardic.  Chest/Pulmonary: Normal work of breathing. Speaking in complete sentences. Lungs CTAB. No chest wall tenderness or deformities.  Abdomen: Soft, nondistended. Nontender without guarding or rebound.  Extremities: Normal AROM of all major joints.  Back: Small area of ecchymosis over mid thoracic spine. Mild tenderness to thoracic and lumbar spine.  Skin: Warm and dry. Normal skin color.   Neuro: Speech clear. CNs grossly intact. Moves all extremities spontaneously.   Psych: Cooperative, memory appropriate. Anxious affect.    LAB  Labs Ordered and Resulted from Time of ED Arrival to Time of ED Departure   HCG QUALITATIVE URINE - Normal       Result Value    hCG Urine Qualitative Negative         RADIOLOGY  Lumbar spine CT w/o contrast   Final Result   IMPRESSION:   1.  No evidence of an acute osseous abnormality of the lumbar spine.      CT Thoracic Spine w/o Contrast   Final Result   IMPRESSION:   1.  No " evidence of an acute osseous abnormality of the thoracic spine.         CT Head w/o Contrast   Final Result   IMPRESSION:   1.  No acute intracranial process.          EKG  Performed at: 19:21, 14-Aug-2023  Impression: Sinus tachycardia.  No acute ischemic changes.  Normal intervals.  No evidence of WPW, Brugada, HOCM.  Compared to previous, T waves now inverted in lead V2 and V3.  Rate: 105  Rhythm: sinus tachycardia   QRS Interval: 80  QTc Interval: 459  Comparison: 11/26/2022    All laboratory and imaging results and EKG's were personally reviewed and interpreted by myself prior to disposition decision.         I, Noy Linn, am serving as a scribe to document services personally performed by Dr. Radha Smiley based on my observation and the provider's statements to me. I, Radha Smiley MD attest that Noy Linn is acting in a scribe capacity, has observed my performance of the services and has documented them in accordance with my direction.    Radha Smiley M.D.  Emergency Medicine  [facility] EMERGENCY DEPARTMENT  [address] [place] [id number]  [phone]  Dept: [phone]     Radha Smiley MD  08/15/23 4077

## 2023-08-15 NOTE — TELEPHONE ENCOUNTER
Routing refill request to provider for review/approval because:  Patient states she is out of medications.  Patient noted that she is taking up to 2 tabs of lorazepam daily, stated she was advised by PCP okay to take this amount.    Last office visit provider:  6/13/2023  Patient has office visit scheduled with Dr. Rivas tomorrow for ED follow up.       Requested Prescriptions   Pending Prescriptions Disp Refills    LORazepam (ATIVAN) 0.5 MG tablet 15 tablet 0     Sig: Take 0.5-1 tablets (0.25-0.5 mg) by mouth daily as needed for anxiety       There is no refill protocol information for this order       pantoprazole (PROTONIX) 40 MG EC tablet 90 tablet 1     Sig: Take 1 tablet (40 mg) by mouth daily       There is no refill protocol information for this order          Jennifer Zapata RN 08/15/23 11:29 AM

## 2023-08-15 NOTE — Clinical Note
Julita Fernandez was seen and treated in our emergency department on 8/14/2023.  She may return to work on 08/16/2023.       If you have any questions or concerns, please don't hesitate to call.      Radha Smiley MD

## 2023-08-17 ENCOUNTER — PATIENT OUTREACH (OUTPATIENT)
Dept: CARE COORDINATION | Facility: CLINIC | Age: 23
End: 2023-08-17
Payer: COMMERCIAL

## 2023-08-17 NOTE — PROGRESS NOTES
[facility] Resource Center Contact  [facility]/Voicemail     Clinical Data: Care Coordination ED-sourced Outreach-     Outreach attempted x 2.  Left message on patient's voicemail, providing [facility]'s 24/7 scheduling and nurse triage phone number [phone] ([phone]) for questions/concerns and/or to schedule an appt with an [facility] provider.      Care Coordination introduction letter with explanation of Clinic Care Coordination services sent to patient via elarmt. Clinic Care Coordination services remain available via referral if needed.    Plan: [facility] will do no further outreaches at this time.       TIMUR Borrero  Connected Care Resource [place], [facility]    *Connected Care Resource Team does NOT follow patient ongoing. Referrals are identified based on internal discharge reports and the outreach is to ensure patient has an understanding of their discharge instructions.

## 2023-08-17 NOTE — LETTER
Julita Fernandez  [address]  [place] [address]    Dear Julita Fernandez,      I am a team member within the Connected Care Resource Center with [facility]. I recently tried to reach you to ensure you were doing well following a recent visit within our health system. I also wanted to take this chance to introduce Clinic Care Coordination.     Below is a description of Clinic Care Coordination and how this team can further assist you:       The Clinic Care Coordination team is made up of a Registered Nurse, , Financial Resource Worker, and a Community Health Worker who understand and can help navigate the health care system. The goal of clinic care coordination is to help you manage your health, improve access to care, and achieve optimal health outcomes. They work alongside your provider to assist you in determining your health and social needs, obtain health care and community resources, and provide you with necessary information and education. Clinic Care Coordination can work with you through any barriers and develop a care plan that helps coordinate and strengthen the relationship between you and your care team.    If you wish to connect with the Clinic Care Coordination Team, please let your [facility] Primary Care Provider or Clinic Care Team know and they can place a referral. The Clinic Care Coordination team will then reach out by phone to further support you.    We are focused on providing you with the highest-quality healthcare experience possible.    Sincerely,   Your care team with [facility]

## 2023-08-27 ENCOUNTER — HOSPITAL ENCOUNTER (EMERGENCY)
Facility: HOSPITAL | Age: 23
Discharge: HOME | End: 2023-08-27
Attending: EMERGENCY MEDICINE | Admitting: EMERGENCY MEDICINE
Payer: COMMERCIAL

## 2023-08-27 VITALS
HEART RATE: 99 BPM | TEMPERATURE: 98.2 F | DIASTOLIC BLOOD PRESSURE: 54 MMHG | SYSTOLIC BLOOD PRESSURE: 93 MMHG | BODY MASS INDEX: 17.47 KG/M2 | WEIGHT: 105 LBS | OXYGEN SATURATION: 95 % | RESPIRATION RATE: 23 BRPM

## 2023-08-27 DIAGNOSIS — E86.0 DEHYDRATION: ICD-10-CM

## 2023-08-27 DIAGNOSIS — F41.9 ANXIETY: ICD-10-CM

## 2023-08-27 LAB
ANION GAP SERPL CALCULATED.3IONS-SCNC: 22 MMOL/L (ref 7–15)
ANION GAP SERPL CALCULATED.3IONS-SCNC: 29 MMOL/L (ref 7–15)
BASOPHILS # BLD AUTO: 0 10E3/UL (ref 0–0.2)
BASOPHILS NFR BLD AUTO: 0 %
BUN SERPL-MCNC: 12.4 MG/DL (ref 6–20)
BUN SERPL-MCNC: 14.4 MG/DL (ref 6–20)
CALCIUM SERPL-MCNC: 7.6 MG/DL (ref 8.6–10)
CALCIUM SERPL-MCNC: 9.4 MG/DL (ref 8.6–10)
CHLORIDE SERPL-SCNC: 105 MMOL/L (ref 98–107)
CHLORIDE SERPL-SCNC: 97 MMOL/L (ref 98–107)
CREAT SERPL-MCNC: 0.76 MG/DL (ref 0.51–0.95)
CREAT SERPL-MCNC: 0.85 MG/DL (ref 0.51–0.95)
DEPRECATED HCO3 PLAS-SCNC: 13 MMOL/L (ref 22–29)
DEPRECATED HCO3 PLAS-SCNC: 14 MMOL/L (ref 22–29)
EOSINOPHIL # BLD AUTO: 0 10E3/UL (ref 0–0.7)
EOSINOPHIL NFR BLD AUTO: 0 %
ERYTHROCYTE [DISTWIDTH] IN BLOOD BY AUTOMATED COUNT: 14.3 % (ref 10–15)
GFR SERPL CREATININE-BSD FRML MDRD: >90 ML/MIN/1.73M2
GFR SERPL CREATININE-BSD FRML MDRD: >90 ML/MIN/1.73M2
GLUCOSE SERPL-MCNC: 56 MG/DL (ref 70–99)
GLUCOSE SERPL-MCNC: 61 MG/DL (ref 70–99)
HCT VFR BLD AUTO: 41.6 % (ref 35–47)
HGB BLD-MCNC: 13.4 G/DL (ref 11.7–15.7)
IMM GRANULOCYTES # BLD: 0.1 10E3/UL
IMM GRANULOCYTES NFR BLD: 1 %
LYMPHOCYTES # BLD AUTO: 1.7 10E3/UL (ref 0.8–5.3)
LYMPHOCYTES NFR BLD AUTO: 13 %
MAGNESIUM SERPL-MCNC: 2.2 MG/DL (ref 1.7–2.3)
MCH RBC QN AUTO: 32 PG (ref 26.5–33)
MCHC RBC AUTO-ENTMCNC: 32.2 G/DL (ref 31.5–36.5)
MCV RBC AUTO: 99 FL (ref 78–100)
MONOCYTES # BLD AUTO: 1 10E3/UL (ref 0–1.3)
MONOCYTES NFR BLD AUTO: 8 %
NEUTROPHILS # BLD AUTO: 10.1 10E3/UL (ref 1.6–8.3)
NEUTROPHILS NFR BLD AUTO: 78 %
NRBC # BLD AUTO: 0 10E3/UL
NRBC BLD AUTO-RTO: 0 /100
PLATELET # BLD AUTO: 300 10E3/UL (ref 150–450)
POTASSIUM SERPL-SCNC: 4.2 MMOL/L (ref 3.4–5.3)
POTASSIUM SERPL-SCNC: 4.2 MMOL/L (ref 3.4–5.3)
RBC # BLD AUTO: 4.19 10E6/UL (ref 3.8–5.2)
SODIUM SERPL-SCNC: 140 MMOL/L (ref 136–145)
SODIUM SERPL-SCNC: 140 MMOL/L (ref 136–145)
WBC # BLD AUTO: 13 10E3/UL (ref 4–11)

## 2023-08-27 PROCEDURE — 83735 ASSAY OF MAGNESIUM: CPT | Performed by: EMERGENCY MEDICINE

## 2023-08-27 PROCEDURE — 85025 COMPLETE CBC W/AUTO DIFF WBC: CPT | Performed by: EMERGENCY MEDICINE

## 2023-08-27 PROCEDURE — 250N000011 HC RX IP 250 OP 636: Performed by: EMERGENCY MEDICINE

## 2023-08-27 PROCEDURE — 99284 EMERGENCY DEPT VISIT MOD MDM: CPT | Mod: 25

## 2023-08-27 PROCEDURE — 96374 THER/PROPH/DIAG INJ IV PUSH: CPT

## 2023-08-27 PROCEDURE — 80048 BASIC METABOLIC PNL TOTAL CA: CPT | Performed by: EMERGENCY MEDICINE

## 2023-08-27 PROCEDURE — 36415 COLL VENOUS BLD VENIPUNCTURE: CPT | Performed by: EMERGENCY MEDICINE

## 2023-08-27 PROCEDURE — 96361 HYDRATE IV INFUSION ADD-ON: CPT

## 2023-08-27 PROCEDURE — 96375 TX/PRO/DX INJ NEW DRUG ADDON: CPT

## 2023-08-27 PROCEDURE — 93005 ELECTROCARDIOGRAM TRACING: CPT | Performed by: EMERGENCY MEDICINE

## 2023-08-27 PROCEDURE — 258N000003 HC RX IP 258 OP 636: Performed by: EMERGENCY MEDICINE

## 2023-08-27 RX ORDER — KETOROLAC TROMETHAMINE 15 MG/ML
15 INJECTION, SOLUTION INTRAMUSCULAR; INTRAVENOUS ONCE
Status: COMPLETED | OUTPATIENT
Start: 2023-08-27 | End: 2023-08-27

## 2023-08-27 RX ORDER — DROPERIDOL 2.5 MG/ML
1.25 INJECTION, SOLUTION INTRAMUSCULAR; INTRAVENOUS ONCE
Status: COMPLETED | OUTPATIENT
Start: 2023-08-27 | End: 2023-08-27

## 2023-08-27 RX ADMIN — DROPERIDOL 1.25 MG: 2.5 INJECTION, SOLUTION INTRAMUSCULAR; INTRAVENOUS at 02:44

## 2023-08-27 RX ADMIN — SODIUM CHLORIDE 1000 ML: 9 INJECTION, SOLUTION INTRAVENOUS at 03:37

## 2023-08-27 RX ADMIN — SODIUM CHLORIDE 1000 ML: 9 INJECTION, SOLUTION INTRAVENOUS at 02:43

## 2023-08-27 RX ADMIN — KETOROLAC TROMETHAMINE 15 MG: 15 INJECTION INTRAMUSCULAR; INTRAVENOUS at 03:33

## 2023-08-27 ASSESSMENT — ACTIVITIES OF DAILY LIVING (ADL)
ADLS_ACUITY_SCORE: 35
ADLS_ACUITY_SCORE: 35

## 2023-08-27 NOTE — ED TRIAGE NOTES
Pt woke up this morning having panic attack. Pt takes Ativan everyday for this. Ativan did not help with her panic attack and so pt smoke marijuana. Marijuana did not help her. Pt had a shot of vodka 5 hours ago. Pt vomited after and has been vomiting non stop. Unable to keep fluids in. Pt c/o neck and lower back pain. Pt had concussion after fainting and hit her head last week.     Triage Assessment       Row Name 08/27/23 0211       Triage Assessment (Adult)    Airway WDL WDL       Respiratory WDL    Respiratory WDL WDL       Skin Circulation/Temperature WDL    Skin Circulation/Temperature WDL WDL       Cardiac WDL    Cardiac WDL WDL       Peripheral/Neurovascular WDL    Peripheral Neurovascular WDL WDL       Cognitive/Neuro/Behavioral WDL    Cognitive/Neuro/Behavioral WDL WDL

## 2023-08-27 NOTE — ED PROVIDER NOTES
EMERGENCY DEPARTMENT ENCOUNTER      NAME: Julita Fernandez  AGE: 22 year old female  YOB: 2000  MRN: [id number]  EVALUATION DATE & TIME: 8/27/2023  2:19 AM    PCP: Carolyn Latham    ED PROVIDER: William Lopez M.D.      Chief Complaint   Patient presents with    Panic Attack    Vomiting         FINAL IMPRESSION:  1. Dehydration    2. Anxiety          ED COURSE & MEDICAL DECISION MAKING:    Pertinent Labs & Imaging studies reviewed. (See chart for details)  22 year old female presents to the Emergency Department for evaluation of palpitations pain attack and vomiting.  Has a long history of anxiety.  This consistent with her anxiety symptoms.  Was seen here previously had a thorough work-up.  I do not think this needs to be done again.  This does seem to be consistent with her anxiety.  Did do an EKG that showed sinus tachycardia.  I do not think this is pulmonary embolus.  I would not pursue work-up.  No signs of cardiac disease.  Get electrolytes which are notable for an anion gap and mildly decreased bicarb.  Likely due to her dehydration in addition to anxiety and hyperventilation.  Given IV fluids did somewhat improve this.  She is feeling better.  Patient's white count mildly elevated.  No signs of infection.  I think further work-up is necessary.  Patient feeling better here after IV Toradol IV droperidol and IV fluids.  I do think she safe for discharge home.  We will follow-up with her primary next week to get her labs rechecked.  Return for any worsening symptoms.    2:23 AM I met with the patient to gather history and to perform my initial exam. I discussed the plan for care while in the Emergency Department.       At the conclusion of the encounter I discussed the results of all of the tests and the disposition. The questions were answered. The patient or family acknowledged understanding and was agreeable with the care plan.     Medical Decision Making    History:  Supplemental  history from: Documented in chart, if applicable  External Record(s) reviewed: Documented in chart, if applicable.    Work Up:  Chart documentation includes differential considered and any EKGs or imaging independently interpreted by provider, where specified.  In additional to work up documented, I considered the following work up: Documented in chart, if applicable.    External consultation:  Discussion of management with another provider: Documented in chart, if applicable    Complicating factors:  Care impacted by chronic illness: Mental Health  Care affected by social determinants of health: Access to Medical Care    Disposition considerations: Discharge. I prescribed additional prescription strength medication(s) as charted. See documentation for any additional details.             MEDICATIONS GIVEN IN THE EMERGENCY:  Medications   0.9% sodium chloride BOLUS (0 mLs Intravenous Stopped 8/27/23 0334)   droPERidol (INAPSINE) injection 1.25 mg (1.25 mg Intravenous $Given 8/27/23 0244)   ketorolac (TORADOL) injection 15 mg (15 mg Intravenous $Given 8/27/23 0333)   0.9% sodium chloride BOLUS (0 mLs Intravenous Stopped 8/27/23 0449)       NEW PRESCRIPTIONS STARTED AT TODAY'S ER VISIT  Discharge Medication List as of 8/27/2023  5:30 AM             =================================================================    HPI    Patient information was obtained from: Patient     Use of : N/A       Julita Fernandez is a 22 year old female with a pertinent history of anxiety, GERD, marijuana use, and history of alcohol abuse who presents to this ED for evaluation of vomiting and anxiety    Per chart review: The patient was seen in this ED on 8/15 for a head injury. Patient reported headache and back pain since passing out and falling. The patient had a head CT and thoracic spine CT that were normal. Patient diagnosed with a concussion and discharged with a plan to follow up with their primary care provider.    The  "patient reports they have a history of anxiety and have \"panic attacks every day\". They take Ativan daily and usually it works, but today the patient was \"panicking\" all day and \"spent three hours unable to move\" due to the anxiety. The patient also reports vomiting, headache, and back pain. The patient also reports their heart has been \"racing\". The patient states, their \"parents saying stuff\" triggered their anxiety today. The patient denies chance of pregnancy.    The patient notes usually smoking marijuana helps their anxiety, but it did not help today.    Social history: The patient denies any alcohol use or drug use other than marijuana tonight. The patient reports they have not vaped recently.       PAST MEDICAL HISTORY:  Past Medical History:   Diagnosis Date    Anxiety     Gastroesophageal reflux disease        PAST SURGICAL HISTORY:  History reviewed. No pertinent surgical history.        CURRENT MEDICATIONS:    No current facility-administered medications for this encounter.     Current Outpatient Medications   Medication    hydrOXYzine (VISTARIL) 25 MG capsule    levonorgestrel (MIRENA) 20 mcg/24 hours (5 yrs) 52 mg IUD    LORazepam (ATIVAN) 0.5 MG tablet    nitroFURantoin macrocrystal-monohydrate (MACROBID) 100 MG capsule    pantoprazole (PROTONIX) 40 MG EC tablet    sertraline (ZOLOFT) 50 MG tablet         ALLERGIES:  Allergies   Allergen Reactions    Soy [Isoflavones (Soy)] Other (See Comments)     Facial eczema        FAMILY HISTORY:  History reviewed. No pertinent family history.    SOCIAL HISTORY:   Social History     Socioeconomic History    Marital status: Single    Number of children: 0    Years of education: 13    Highest education level: High school graduate   Occupational History    Occupation: Student   Tobacco Use    Smoking status: Never    Smokeless tobacco: Never   Vaping Use    Vaping Use: Former    Start date: 1/1/2020    Quit date: 5/1/2022    Substances: Nicotine, THC, Flavoring    " Devices: Disposable, Pre-filled or refillable cartridge   Substance and Sexual Activity    Alcohol use: Not Currently    Drug use: Yes     Frequency: 4.0 times per week     Types: Marijuana    Sexual activity: Yes     Partners: Male     Birth control/protection: I.U.D.   Social History Narrative    [employer] in [place]. Taking a break now to save money and find her career path. Applying for jobs in the summer for [employer]; thinking about going to [employer] after [employer] for [employer]    Living with her parents- Tian (cardiologist) & Marijaaneesh Watson in high school, otherwise not vaping or smoking    Rare use of alcohol    Using THC almost daily in the mornings    Carolyn Latham MD           VITALS:  BP 93/54   Pulse 99   Temp 98.2  F (36.8  C) (Tympanic)   Resp 23   Wt 47.6 kg (105 lb)   LMP  (LMP Unknown)   SpO2 95%   BMI 17.47 kg/m      PHYSICAL EXAM    Physical Exam  Vitals and nursing note reviewed.   Constitutional:       General: She is not in acute distress.     Appearance: She is not diaphoretic.   HENT:      Head: Atraumatic.      Mouth/Throat:      Pharynx: No oropharyngeal exudate.   Eyes:      General: No scleral icterus.     Pupils: Pupils are equal, round, and reactive to light.   Cardiovascular:      Rate and Rhythm: Regular rhythm. Tachycardia present.      Heart sounds: Normal heart sounds.   Pulmonary:      Effort: No respiratory distress.      Breath sounds: Normal breath sounds.   Abdominal:      Palpations: Abdomen is soft.      Tenderness: There is no abdominal tenderness. There is no guarding or rebound. Negative signs include Molina's sign.   Musculoskeletal:         General: No tenderness.   Skin:     General: Skin is warm.      Findings: No rash.   Neurological:      General: No focal deficit present.      Mental Status: She is alert.           LAB:  All pertinent labs reviewed and interpreted.  Labs Ordered and Resulted from Time of ED Arrival to  Time of ED Departure   BASIC METABOLIC PANEL - Abnormal       Result Value    Sodium 140      Potassium 4.2      Chloride 97 (*)     Carbon Dioxide (CO2) 14 (*)     Anion Gap 29 (*)     Urea Nitrogen 14.4      Creatinine 0.85      Calcium 9.4      Glucose 61 (*)     GFR Estimate >90     CBC WITH PLATELETS AND DIFFERENTIAL - Abnormal    WBC Count 13.0 (*)     RBC Count 4.19      Hemoglobin 13.4      Hematocrit 41.6      MCV 99      MCH 32.0      MCHC 32.2      RDW 14.3      Platelet Count 300      % Neutrophils 78      % Lymphocytes 13      % Monocytes 8      % Eosinophils 0      % Basophils 0      % Immature Granulocytes 1      NRBCs per 100 WBC 0      Absolute Neutrophils 10.1 (*)     Absolute Lymphocytes 1.7      Absolute Monocytes 1.0      Absolute Eosinophils 0.0      Absolute Basophils 0.0      Absolute Immature Granulocytes 0.1      Absolute NRBCs 0.0     BASIC METABOLIC PANEL - Abnormal    Sodium 140      Potassium 4.2      Chloride 105      Carbon Dioxide (CO2) 13 (*)     Anion Gap 22 (*)     Urea Nitrogen 12.4      Creatinine 0.76      Calcium 7.6 (*)     Glucose 56 (*)     GFR Estimate >90     MAGNESIUM - Normal    Magnesium 2.2         RADIOLOGY:  Reviewed all pertinent imaging. Please see official radiology report.  No orders to display       EKG:    Performed at: 308  Impression: Sinus tachycardia no acute ischemic changes.  Unchanged from previous dated August 14, 2023  Sinus tachycardia rate of 110.  .  QRS 76.  QTc 468    Dr. Lopez independently reviewed and interpreted the patient's EKG, with comments made as listed above.      I, Naheed Mora, am serving as a scribe to document services personally performed by Dr. William Lopez, based on my observation and the provider's statements to me. I, William Lopez MD attest that Naheed Mora is acting in a scribe capacity, has observed my performance of the services and has documented them in accordance with my direction.    William Lopez,  M.D.  Emergency Medicine  [facility] EMERGENCY DEPARTMENT  [facility] [facility] [id number] [phone]  Dept: [phone]       William Lopez MD  08/27/23 0539

## 2023-08-28 ENCOUNTER — PATIENT OUTREACH (OUTPATIENT)
Dept: CARE COORDINATION | Facility: CLINIC | Age: 23
End: 2023-08-28
Payer: COMMERCIAL

## 2023-08-28 NOTE — LETTER
Julita Fernandez  [address]  [facility] [address]    Dear Julita Fernandez,      I am a team member within the [facility] with [facility]. I recently contacted you to ensure you were doing well following a recent visit within our health system. I also wanted to take this chance to introduce Clinic Care Coordination should you have any interest in this program in the future.    Below is a description of Clinic Care Coordination and how this team can further assist you:       The Clinic Care Coordination team is made up of a Registered Nurse, , Financial Resource Worker, and a Community Health Worker who understand and can help navigate the health care system. The goal of clinic care coordination is to help you manage your health, improve access to care, and achieve optimal health outcomes. They work alongside your provider to assist you in determining your health and social needs, obtain health care and community resources, and provide you with necessary information and education. Clinic Care Coordination can work with you through any barriers and develop a care plan that helps coordinate and strengthen the relationship between you and your care team.    If you wish to connect with the Clinic Care Coordination Team, please let your [facility] Primary Care Provider or Clinic Care Team know and they can place a referral. The Clinic Care Coordination team will then reach out by phone to further support you.    We are focused on providing you with the highest-quality healthcare experience possible.    Sincerely,   Your care team with [facility]

## 2023-08-28 NOTE — PROGRESS NOTES
Clinic Care Coordination Contact  Community Health Worker Initial Outreach    CHW Initial Information Gathering:  Referral Source: ED Follow-Up  CHW Additional Questions  If ED/Hospital discharge, follow-up appointment scheduled as recommended?: Yes  Norahart active?: Yes    Patient accepts CC: No, patient declined at this time. Patient will be sent Care Coordination introduction letter for future reference.     Karen Lenz  Community Health Worker  [facility] Care Resource [facility]

## 2023-08-31 ENCOUNTER — VIRTUAL VISIT (OUTPATIENT)
Dept: FAMILY MEDICINE | Facility: CLINIC | Age: 23
End: 2023-08-31
Payer: COMMERCIAL

## 2023-08-31 DIAGNOSIS — F12.90 MARIJUANA USE: ICD-10-CM

## 2023-08-31 DIAGNOSIS — F13.930 BENZODIAZEPINE WITHDRAWAL WITHOUT COMPLICATION (H): ICD-10-CM

## 2023-08-31 DIAGNOSIS — F41.1 GENERALIZED ANXIETY DISORDER: Primary | ICD-10-CM

## 2023-08-31 PROCEDURE — 99213 OFFICE O/P EST LOW 20 MIN: CPT | Mod: 95 | Performed by: INTERNAL MEDICINE

## 2023-08-31 RX ORDER — HYDROXYZINE PAMOATE 25 MG/1
25-50 CAPSULE ORAL 3 TIMES DAILY PRN
Qty: 30 CAPSULE | Refills: 0 | Status: SHIPPED | OUTPATIENT
Start: 2023-08-31 | End: 2023-09-19

## 2023-08-31 RX ORDER — HYDROXYZINE PAMOATE 25 MG/1
25-50 CAPSULE ORAL 3 TIMES DAILY PRN
Qty: 30 CAPSULE | Refills: 1 | Status: SHIPPED | OUTPATIENT
Start: 2023-08-31 | End: 2023-08-31 | Stop reason: DRUGHIGH

## 2023-08-31 NOTE — PROGRESS NOTES
Julita is a 22 year old who is being evaluated via a billable telephone visit.      What phone number would you like to be contacted at?     [phone]     How would you like to obtain your AVS? Angelo    Distant Location (provider location):  Off-site    Assessment & Plan     Julita was seen today for er f/u.    Diagnoses and all orders for this visit:    Generalized anxiety disorder  -     Discontinue: hydrOXYzine (VISTARIL) 25 MG capsule; Take 1-2 capsules (25-50 mg) by mouth 3 times daily as needed for anxiety  -     hydrOXYzine (VISTARIL) 25 MG capsule; Take 1-2 capsules (25-50 mg) by mouth 3 times daily as needed for anxiety    Benzodiazepine withdrawal without complication (H)  Comments:  possibly benzo withdraw resulting in ER visit.    Marijuana use  Comments:  defer to PCP to address       Benzo overuse, not as prescribed. Possible Benzo withdraw.   I declined lorazepam refill. Defer to PCP.   I recommended she start taking Sertraline 25 mg daily for 1 week, then go to 50 mg daily.   Use hydroxyzine for anxiety/panic.   Follow up with PCP for anxiety management.   Routed chart to PCP.       Mindy Vale MD PhD  [facility]   Julita is a 22 year old, presenting for the following health issues:  ER F/U        8/31/2023     7:57 AM   Additional Questions   Roomed by Jeannette DUMONT     ED/UC Followup:    Facility: [facility] ED  Date of visit: 8/27/23  Reason for visit: Dehydration and Anxiety   Current Status: Anxiety is still pretty bad, Dehydration is better       She ran out of lorazepam, not feeling right, had voniting, headache, and ended up in ER. Per ER report pt smokes marijuana as well for anxiety.     She hasn't started taking Sertraline yet. She doesn't have hydroxyzine.   She has been taking Lorazepam mostly for her anxiety. 0.5 mg, takes 2 tablets 2 times a day. She is back at school, extra anxious.     Per chart review, Lorazepam 0.5 mg tab  was given on 8/15/2023, with instruction of 1/2 to 1 tab daily.     Review of Systems   Constitutional, HEENT, cardiovascular, pulmonary, gi and gu systems are negative, except as otherwise noted.      Objective           Vitals:  No vitals were obtained today due to virtual visit.    Physical Exam   healthy, alert, and no distress  PSYCH: Alert and oriented times 3; coherent speech, normal   rate and volume, able to articulate logical thoughts, able   to abstract reason, no tangential thoughts, no hallucinations   or delusions  Her affect is normal  RESP: No cough, no audible wheezing, able to talk in full sentences  Remainder of exam unable to be completed due to telephone visits            Phone call duration: 5 minutes

## 2023-09-01 LAB
ATRIAL RATE - MUSE: 110 BPM
DIASTOLIC BLOOD PRESSURE - MUSE: 70 MMHG
INTERPRETATION ECG - MUSE: NORMAL
P AXIS - MUSE: 78 DEGREES
PR INTERVAL - MUSE: 136 MS
QRS DURATION - MUSE: 76 MS
QT - MUSE: 346 MS
QTC - MUSE: 468 MS
R AXIS - MUSE: 92 DEGREES
SYSTOLIC BLOOD PRESSURE - MUSE: 121 MMHG
T AXIS - MUSE: 81 DEGREES
VENTRICULAR RATE- MUSE: 110 BPM

## 2023-09-18 ENCOUNTER — OFFICE VISIT (OUTPATIENT)
Dept: FAMILY MEDICINE | Facility: CLINIC | Age: 23
End: 2023-09-18
Attending: FAMILY MEDICINE
Payer: COMMERCIAL

## 2023-09-18 ENCOUNTER — HOSPITAL ENCOUNTER (OUTPATIENT)
Facility: CLINIC | Age: 23
Setting detail: OBSERVATION
Discharge: HOME | End: 2023-09-19
Attending: EMERGENCY MEDICINE | Admitting: EMERGENCY MEDICINE
Payer: COMMERCIAL

## 2023-09-18 VITALS
DIASTOLIC BLOOD PRESSURE: 82 MMHG | HEART RATE: 90 BPM | WEIGHT: 108.7 LBS | SYSTOLIC BLOOD PRESSURE: 112 MMHG | OXYGEN SATURATION: 95 % | BODY MASS INDEX: 18.09 KG/M2

## 2023-09-18 DIAGNOSIS — F13.930 BENZODIAZEPINE WITHDRAWAL WITHOUT COMPLICATION (H): ICD-10-CM

## 2023-09-18 DIAGNOSIS — N39.0 URINARY TRACT INFECTION WITHOUT HEMATURIA, SITE UNSPECIFIED: ICD-10-CM

## 2023-09-18 DIAGNOSIS — F12.90 MARIJUANA USE: ICD-10-CM

## 2023-09-18 DIAGNOSIS — F43.10 PTSD (POST-TRAUMATIC STRESS DISORDER): ICD-10-CM

## 2023-09-18 DIAGNOSIS — F10.10 ALCOHOL ABUSE: ICD-10-CM

## 2023-09-18 DIAGNOSIS — F41.1 GENERALIZED ANXIETY DISORDER: ICD-10-CM

## 2023-09-18 DIAGNOSIS — F41.0 PANIC ATTACK: Primary | ICD-10-CM

## 2023-09-18 LAB
ALBUMIN SERPL BCG-MCNC: 5 G/DL (ref 3.5–5.2)
ALBUMIN UR-MCNC: NEGATIVE MG/DL
ALP SERPL-CCNC: 122 U/L (ref 35–104)
ALT SERPL W P-5'-P-CCNC: 41 U/L (ref 0–50)
AMPHETAMINES UR QL SCN: ABNORMAL
ANION GAP SERPL CALCULATED.3IONS-SCNC: 19 MMOL/L (ref 7–15)
APPEARANCE UR: ABNORMAL
AST SERPL W P-5'-P-CCNC: 70 U/L (ref 0–45)
BACTERIA #/AREA URNS HPF: ABNORMAL /HPF
BARBITURATES UR QL SCN: ABNORMAL
BASOPHILS # BLD AUTO: 0.1 10E3/UL (ref 0–0.2)
BASOPHILS NFR BLD AUTO: 0 %
BENZODIAZ UR QL SCN: ABNORMAL
BILIRUB SERPL-MCNC: 0.6 MG/DL
BILIRUB UR QL STRIP: NEGATIVE
BUN SERPL-MCNC: 9.9 MG/DL (ref 6–20)
BZE UR QL SCN: ABNORMAL
CALCIUM SERPL-MCNC: 9.8 MG/DL (ref 8.6–10)
CANNABINOIDS UR QL SCN: ABNORMAL
CHLORIDE SERPL-SCNC: 101 MMOL/L (ref 98–107)
COLOR UR AUTO: ABNORMAL
CREAT SERPL-MCNC: 0.6 MG/DL (ref 0.51–0.95)
DEPRECATED HCO3 PLAS-SCNC: 22 MMOL/L (ref 22–29)
EGFRCR SERPLBLD CKD-EPI 2021: >90 ML/MIN/1.73M2
EOSINOPHIL # BLD AUTO: 0 10E3/UL (ref 0–0.7)
EOSINOPHIL NFR BLD AUTO: 0 %
ERYTHROCYTE [DISTWIDTH] IN BLOOD BY AUTOMATED COUNT: 14.2 % (ref 10–15)
ETHANOL SERPL-MCNC: <0.01 G/DL
FENTANYL UR QL: ABNORMAL
GLUCOSE SERPL-MCNC: 91 MG/DL (ref 70–99)
GLUCOSE UR STRIP-MCNC: NEGATIVE MG/DL
HCG SERPL QL: NEGATIVE
HCT VFR BLD AUTO: 38.2 % (ref 35–47)
HGB BLD-MCNC: 12.7 G/DL (ref 11.7–15.7)
HGB UR QL STRIP: ABNORMAL
IMM GRANULOCYTES # BLD: 0.1 10E3/UL
IMM GRANULOCYTES NFR BLD: 0 %
KETONES UR STRIP-MCNC: 15 MG/DL
LEUKOCYTE ESTERASE UR QL STRIP: NEGATIVE
LYMPHOCYTES # BLD AUTO: 1.3 10E3/UL (ref 0.8–5.3)
LYMPHOCYTES NFR BLD AUTO: 9 %
MAGNESIUM SERPL-MCNC: 1.8 MG/DL (ref 1.7–2.3)
MCH RBC QN AUTO: 32.1 PG (ref 26.5–33)
MCHC RBC AUTO-ENTMCNC: 33.2 G/DL (ref 31.5–36.5)
MCV RBC AUTO: 97 FL (ref 78–100)
MONOCYTES # BLD AUTO: 0.8 10E3/UL (ref 0–1.3)
MONOCYTES NFR BLD AUTO: 5 %
MUCOUS THREADS #/AREA URNS LPF: PRESENT /LPF
NEUTROPHILS # BLD AUTO: 12.2 10E3/UL (ref 1.6–8.3)
NEUTROPHILS NFR BLD AUTO: 86 %
NITRATE UR QL: POSITIVE
NRBC # BLD AUTO: 0 10E3/UL
NRBC BLD AUTO-RTO: 0 /100
OPIATES UR QL SCN: ABNORMAL
PCP QUAL URINE (ROCHE): ABNORMAL
PH UR STRIP: 6 [PH] (ref 5–8)
PLATELET # BLD AUTO: 260 10E3/UL (ref 150–450)
POTASSIUM SERPL-SCNC: 3.8 MMOL/L (ref 3.4–5.3)
PROT SERPL-MCNC: 7.8 G/DL (ref 6.4–8.3)
RBC # BLD AUTO: 3.96 10E6/UL (ref 3.8–5.2)
RBC #/AREA URNS AUTO: ABNORMAL /HPF
SODIUM SERPL-SCNC: 142 MMOL/L (ref 136–145)
SP GR UR STRIP: 1.02 (ref 1–1.03)
SQUAMOUS #/AREA URNS AUTO: ABNORMAL /LPF
UROBILINOGEN UR STRIP-ACNC: 0.2 E.U./DL
WBC # BLD AUTO: 14.4 10E3/UL (ref 4–11)
WBC #/AREA URNS AUTO: ABNORMAL /HPF

## 2023-09-18 PROCEDURE — 81001 URINALYSIS AUTO W/SCOPE: CPT | Performed by: FAMILY MEDICINE

## 2023-09-18 PROCEDURE — 99215 OFFICE O/P EST HI 40 MIN: CPT | Performed by: FAMILY MEDICINE

## 2023-09-18 PROCEDURE — 250N000009 HC RX 250: Performed by: EMERGENCY MEDICINE

## 2023-09-18 PROCEDURE — 250N000011 HC RX IP 250 OP 636: Mod: JZ

## 2023-09-18 PROCEDURE — 96376 TX/PRO/DX INJ SAME DRUG ADON: CPT

## 2023-09-18 PROCEDURE — 96375 TX/PRO/DX INJ NEW DRUG ADDON: CPT

## 2023-09-18 PROCEDURE — 96365 THER/PROPH/DIAG IV INF INIT: CPT

## 2023-09-18 PROCEDURE — 99285 EMERGENCY DEPT VISIT HI MDM: CPT | Mod: 25

## 2023-09-18 PROCEDURE — 87086 URINE CULTURE/COLONY COUNT: CPT | Performed by: FAMILY MEDICINE

## 2023-09-18 PROCEDURE — 83735 ASSAY OF MAGNESIUM: CPT | Performed by: SOCIAL WORKER

## 2023-09-18 PROCEDURE — 96366 THER/PROPH/DIAG IV INF ADDON: CPT

## 2023-09-18 PROCEDURE — 96361 HYDRATE IV INFUSION ADD-ON: CPT

## 2023-09-18 PROCEDURE — 80053 COMPREHEN METABOLIC PANEL: CPT | Performed by: EMERGENCY MEDICINE

## 2023-09-18 PROCEDURE — 36415 COLL VENOUS BLD VENIPUNCTURE: CPT | Performed by: SOCIAL WORKER

## 2023-09-18 PROCEDURE — 250N000011 HC RX IP 250 OP 636: Performed by: EMERGENCY MEDICINE

## 2023-09-18 PROCEDURE — 80307 DRUG TEST PRSMV CHEM ANLYZR: CPT | Performed by: EMERGENCY MEDICINE

## 2023-09-18 PROCEDURE — 83735 ASSAY OF MAGNESIUM: CPT | Performed by: EMERGENCY MEDICINE

## 2023-09-18 PROCEDURE — 258N000003 HC RX IP 258 OP 636: Performed by: EMERGENCY MEDICINE

## 2023-09-18 PROCEDURE — 82077 ASSAY SPEC XCP UR&BREATH IA: CPT | Performed by: EMERGENCY MEDICINE

## 2023-09-18 PROCEDURE — 85025 COMPLETE CBC W/AUTO DIFF WBC: CPT | Performed by: EMERGENCY MEDICINE

## 2023-09-18 PROCEDURE — 87186 SC STD MICRODIL/AGAR DIL: CPT | Performed by: FAMILY MEDICINE

## 2023-09-18 PROCEDURE — 258N000003 HC RX IP 258 OP 636: Performed by: SOCIAL WORKER

## 2023-09-18 PROCEDURE — 84703 CHORIONIC GONADOTROPIN ASSAY: CPT | Performed by: EMERGENCY MEDICINE

## 2023-09-18 PROCEDURE — 85025 COMPLETE CBC W/AUTO DIFF WBC: CPT | Performed by: SOCIAL WORKER

## 2023-09-18 PROCEDURE — 36415 COLL VENOUS BLD VENIPUNCTURE: CPT | Performed by: EMERGENCY MEDICINE

## 2023-09-18 PROCEDURE — 250N000011 HC RX IP 250 OP 636: Mod: JZ | Performed by: SOCIAL WORKER

## 2023-09-18 RX ORDER — ONDANSETRON 2 MG/ML
4 INJECTION INTRAMUSCULAR; INTRAVENOUS ONCE
Status: COMPLETED | OUTPATIENT
Start: 2023-09-18 | End: 2023-09-18

## 2023-09-18 RX ORDER — LORAZEPAM 2 MG/ML
1 INJECTION INTRAMUSCULAR ONCE
Status: COMPLETED | OUTPATIENT
Start: 2023-09-18 | End: 2023-09-18

## 2023-09-18 RX ORDER — NITROFURANTOIN 25; 75 MG/1; MG/1
100 CAPSULE ORAL 2 TIMES DAILY
Qty: 10 CAPSULE | Refills: 0 | Status: SHIPPED | OUTPATIENT
Start: 2023-09-18 | End: 2023-09-23

## 2023-09-18 RX ORDER — ONDANSETRON 4 MG/1
4 TABLET, ORALLY DISINTEGRATING ORAL ONCE
Status: DISCONTINUED | OUTPATIENT
Start: 2023-09-18 | End: 2023-09-18

## 2023-09-18 RX ORDER — DIAZEPAM 5 MG
10 TABLET ORAL EVERY 30 MIN PRN
Status: DISCONTINUED | OUTPATIENT
Start: 2023-09-18 | End: 2023-09-19 | Stop reason: HOSPADM

## 2023-09-18 RX ORDER — FLUMAZENIL 0.1 MG/ML
0.2 INJECTION, SOLUTION INTRAVENOUS
Status: DISCONTINUED | OUTPATIENT
Start: 2023-09-18 | End: 2023-09-19 | Stop reason: HOSPADM

## 2023-09-18 RX ADMIN — ONDANSETRON 4 MG: 2 INJECTION INTRAMUSCULAR; INTRAVENOUS at 17:59

## 2023-09-18 RX ADMIN — ONDANSETRON 4 MG: 2 INJECTION INTRAMUSCULAR; INTRAVENOUS at 20:58

## 2023-09-18 RX ADMIN — SODIUM CHLORIDE 1000 ML: 9 INJECTION, SOLUTION INTRAVENOUS at 17:58

## 2023-09-18 RX ADMIN — LORAZEPAM 1 MG: 2 INJECTION INTRAMUSCULAR; INTRAVENOUS at 20:40

## 2023-09-18 RX ADMIN — FOLIC ACID: 5 INJECTION, SOLUTION INTRAMUSCULAR; INTRAVENOUS; SUBCUTANEOUS at 19:01

## 2023-09-18 ASSESSMENT — ANXIETY QUESTIONNAIRES
5. BEING SO RESTLESS THAT IT IS HARD TO SIT STILL: NEARLY EVERY DAY
6. BECOMING EASILY ANNOYED OR IRRITABLE: MORE THAN HALF THE DAYS
GAD7 TOTAL SCORE: 20
4. TROUBLE RELAXING: NEARLY EVERY DAY
1. FEELING NERVOUS, ANXIOUS, OR ON EDGE: NEARLY EVERY DAY
3. WORRYING TOO MUCH ABOUT DIFFERENT THINGS: NEARLY EVERY DAY
IF YOU CHECKED OFF ANY PROBLEMS ON THIS QUESTIONNAIRE, HOW DIFFICULT HAVE THESE PROBLEMS MADE IT FOR YOU TO DO YOUR WORK, TAKE CARE OF THINGS AT HOME, OR GET ALONG WITH OTHER PEOPLE: VERY DIFFICULT
7. FEELING AFRAID AS IF SOMETHING AWFUL MIGHT HAPPEN: NEARLY EVERY DAY
2. NOT BEING ABLE TO STOP OR CONTROL WORRYING: NEARLY EVERY DAY
GAD7 TOTAL SCORE: 20

## 2023-09-18 ASSESSMENT — ACTIVITIES OF DAILY LIVING (ADL)
ADLS_ACUITY_SCORE: 35

## 2023-09-18 ASSESSMENT — ENCOUNTER SYMPTOMS: NERVOUS/ANXIOUS: 1

## 2023-09-18 NOTE — PROGRESS NOTES
Assessment & Plan     Panic attack  Generalized anxiety disorder  Benzodiazepine withdrawal without complication (H)  Marijuana use  Uncontrolled anxiety. Concern for history of substance use/withdrawal and severe anxiety/panic state currently. She denies any recent substances or Rx use of benzos; she does report THC and vodka use. She endorses desire for inpatient level care for her anxiety which I do agree is appropriate.   Contracts for safety and declines EMS transport.  We discussed full medical and psychiatric evaluation at Empath Unit at [facility] and she agrees. She was escorted out by her boyfriend.      Urinary tract infection without hematuria, site unspecified  Incidentally pt was noted to have UTI sx and UA today with + nitrites, cloudy urine, many bacteria (as well as many squamous epithelial cells). Will treat with macrobid as her July UTI was pansensitive and she tolerated Macrobid well. She was advised to consider picking up her UTI treatment at the pharmacy and then going straight to the ER as it may take some time to wait there.   - UA Macroscopic with reflex to Microscopic and Culture - Lab Collect  - Urine Microscopic Exam  - Urine Culture  - nitroFURantoin macrocrystal-monohydrate (MACROBID) 100 MG capsule; Take 1 capsule (100 mg) by mouth 2 times daily for 5 days    40 minutes spent on the date of the encounter doing chart review, patient visit and documentation.           Carolyn Latham MD  [facility]   Julita is a 22 year old, presenting for the following health issues:  Anxiety (Follow up) and UTI (Previously had a UTI with very little symptoms and worried that she may have one again today. )        9/18/2023     2:32 PM   Additional Questions   Roomed by Darcy PADGETT LPN     Recent ER visit for possible benzo withdrawal- 8/27/23 Dehydration and Anxiety    Had Follow up visit on 8/31 with another provider.     She tells me today she  was seen on 8/27 ER due to severe anxiety (related to school starting). She was using the ativan a lot due to the anxiety- she didn't want the Rx refilled because it wasn't really helping. She hasn't used it in a couple weeks and hasn't started her sertraline. Doesn't like how the hydroxyzine is ineffective so not taking this.     Has been home alone with her boyfriend visiting often; as her parents are out of town  She recently restarted alcohol intake in the past 2 weeks to try to manage anxiety: drinks alcohol most nights 5-6 beverages (vodka usually); last drink last night.   No hx w/d seizures.   She has a hx of upper GI bleed from alcohol use/vomiting. Has been told previously to avoid alcohol.     She has missed so much school. 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 States she was panicky to even be sitting in the exam room waiting for our visit today (though her boyfriend was out in the lobby and she was texting him for support and he certainly could have joined her had she requested to our staff)    She tried left over trazodone to sleep because she didn't want to go to the ER again.     She states the hydroxyzine doesn't help  She didn't feel the ativan was helping anyway so she stopped that    Smoking marijuana has been most helpful- she would like to consider medical cannabis which we can explore down the road pending her work up today.     Diagnosed with complex PTSD through her therapist along with depression/anxiety.     Hasn't been able to go to her classes; very severe anxiety of being around people- but also can't be alone (only wants to be around family/friends she knows)    Due to her anxiety she doesn't eat from nausea; so she gets acid reflux and throws up. Smoking weed helps her appetite    She has intermittent passive suicidal thoughts.  Has a history of self-harm with last being 1 month  ago.  No homicidal thoughts.         She does endorse UTI sx; treated for UTI with Macrobid for pan sensitive Ecoli in July       Per chart review, Lorazepam 0.5 mg tab was given on 8/15/2023, with instruction of 1/2 to 1 tab daily.  Around that time she was using it about 3x/day however and eventually stopped it as not effective but likely contributed to withdrawal prompting ER visit on 8/27.     History of Present Illness       Mental Health Follow-up:  Patient presents to follow-up on Depression & Anxiety.Patient's depression since last visit has been:  Worse  The patient is not having other symptoms associated with depression.  Patient's anxiety since last visit has been:  Worse  The patient is not having other symptoms associated with anxiety.  Any significant life events: relationship concerns and financial concerns  Patient is feeling anxious or having panic attacks.  Patient has no concerns about alcohol or drug use.    She eats 0-1 servings of fruits and vegetables daily.She consumes 0 sweetened beverage(s) daily.She exercises with enough effort to increase her heart rate 10 to 19 minutes per day.  She exercises with enough effort to increase her heart rate 7 days per week.   She is taking medications regularly.       Review of Systems   Psychiatric/Behavioral:  The patient is nervous/anxious.             Objective    /82 (BP Location: Left arm, Patient Position: Sitting, Cuff Size: Adult Regular)   Pulse 90   Wt 49.3 kg (108 lb 11.2 oz)   LMP  (LMP Unknown)   SpO2 95%   BMI 18.09 kg/m    Body mass index is 18.09 kg/m .  Physical Exam   GENERAL: healthy, alert and no distress, very anxious, tearful, constantly tapping feet, hands tremulous when trying to work on her phone to type her boyfriend to come get her; requested emesis bag due to nausea; pale skin  RESP: lungs clear to auscultation - no rales, rhonchi or wheezes  CV: tachycardic rate and regular rhythm, normal S1 S2, no S3 or S4, no  murmur, click or rub, no peripheral edema and peripheral pulses strong  ABDOMEN: soft, nontender  MS: no gross musculoskeletal defects noted, no edema  Psych: extremely anxious; tearful, good insight into the severity of her symptoms; requesting inpatient level care for her anxiety but also contracts for safety (denies SI/HI currently)

## 2023-09-18 NOTE — ED TRIAGE NOTES
Pt has hx of anxiety   Pt has been drinking for 2 weeks  Pt just stopped drinking yesterday   Pt today has nausea and vomiting   Pt is smokes weed every day

## 2023-09-18 NOTE — ED NOTES
"Tele-PIT/Intake Evaluation      Video-Visit Details    Type of service:  Video Visit    Video Start Time (time video started): 5:57 PM  Video End Time (time video stopped): 6:01 PM   Originating Location (pt. Location):  [facility]  Distant Location (provider location): [facility]  Mode of Communication:  Video Conference via [employer]  Patient verbally consented to [facility] televisit.    History:  Presents with significant anxiety, no suicidal ideation at arrival, states has been drinking alcohol heavier than normal, was at her primary care office and they recommended she be seen here for further evaluation and treatment may benefit from our empath unit.    Exam:  General:  Alert, interactive  Cardiovascular:  Well perfused  Lungs:  No respiratory distress, no accessory muscle use  Neuro:  Moving all 4 extremities  Skin:  Warm, dry  Psych:  Normal affect   Patient Vitals for the past 24 hrs:   BP Temp Temp src Pulse Resp SpO2 Height Weight   09/18/23 1752 (!) 165/92 98  F (36.7  C) Oral 115 16 95 % 1.626 m (5' 4\") 49 kg (108 lb)       Appropriate interventions for symptom management were initiated if applicable.  Appropriate diagnostic tests were initiated if indicated.    Important information for subsequent clinician:  Screening laboratory work-up and fluid resuscitation ordered, will likely benefit from the empath unit    I briefly evaluated the patient and developed an initial plan of care. I discussed this plan and explained that this brief interaction does not constitute a full evaluation. Patient/family understands that they should wait to be fully evaluated and discuss any test results with another clinician prior to leaving the hospital.       Trigger, Oswald Betancourt MD  09/18/23 1802    "

## 2023-09-19 ENCOUNTER — TELEPHONE (OUTPATIENT)
Dept: BEHAVIORAL HEALTH | Facility: CLINIC | Age: 23
End: 2023-09-19

## 2023-09-19 VITALS
RESPIRATION RATE: 18 BRPM | BODY MASS INDEX: 18.45 KG/M2 | HEART RATE: 65 BPM | WEIGHT: 108.1 LBS | HEIGHT: 64 IN | DIASTOLIC BLOOD PRESSURE: 85 MMHG | OXYGEN SATURATION: 100 % | TEMPERATURE: 97.8 F | SYSTOLIC BLOOD PRESSURE: 132 MMHG

## 2023-09-19 PROBLEM — F12.10 MARIJUANA ABUSE: Status: ACTIVE | Noted: 2023-09-19

## 2023-09-19 PROBLEM — F10.10 ALCOHOL ABUSE: Status: ACTIVE | Noted: 2023-09-19

## 2023-09-19 PROBLEM — F41.9 ANXIETY: Status: ACTIVE | Noted: 2023-09-19

## 2023-09-19 PROBLEM — F10.10 ALCOHOL ABUSE, EPISODIC DRINKING BEHAVIOR: Status: ACTIVE | Noted: 2023-09-19

## 2023-09-19 PROBLEM — F41.0 PANIC ATTACK: Status: ACTIVE | Noted: 2023-09-19

## 2023-09-19 PROBLEM — F41.9 ANXIETY DISORDER, UNSPECIFIED TYPE: Status: ACTIVE | Noted: 2023-09-19

## 2023-09-19 PROCEDURE — 250N000011 HC RX IP 250 OP 636: Performed by: PSYCHIATRY & NEUROLOGY

## 2023-09-19 PROCEDURE — 90791 PSYCH DIAGNOSTIC EVALUATION: CPT

## 2023-09-19 PROCEDURE — G0378 HOSPITAL OBSERVATION PER HR: HCPCS

## 2023-09-19 PROCEDURE — 99236 HOSP IP/OBS SAME DATE HI 85: CPT | Performed by: PSYCHIATRY & NEUROLOGY

## 2023-09-19 PROCEDURE — 250N000013 HC RX MED GY IP 250 OP 250 PS 637: Performed by: EMERGENCY MEDICINE

## 2023-09-19 RX ORDER — GABAPENTIN 300 MG/1
300 CAPSULE ORAL 3 TIMES DAILY PRN
Qty: 30 CAPSULE | Refills: 0 | Status: SHIPPED | OUTPATIENT
Start: 2023-09-19 | End: 2023-10-12

## 2023-09-19 RX ORDER — ACETAMINOPHEN 325 MG/1
650 TABLET ORAL EVERY 4 HOURS PRN
Status: DISCONTINUED | OUTPATIENT
Start: 2023-09-19 | End: 2023-09-19 | Stop reason: HOSPADM

## 2023-09-19 RX ORDER — ONDANSETRON 4 MG/1
4 TABLET, ORALLY DISINTEGRATING ORAL EVERY 6 HOURS PRN
Status: DISCONTINUED | OUTPATIENT
Start: 2023-09-19 | End: 2023-09-19 | Stop reason: HOSPADM

## 2023-09-19 RX ORDER — PANTOPRAZOLE SODIUM 40 MG/1
40 TABLET, DELAYED RELEASE ORAL DAILY PRN
Status: DISCONTINUED | OUTPATIENT
Start: 2023-09-19 | End: 2023-09-19 | Stop reason: HOSPADM

## 2023-09-19 RX ORDER — HYDROXYZINE HYDROCHLORIDE 25 MG/1
25 TABLET, FILM COATED ORAL 3 TIMES DAILY PRN
Status: DISCONTINUED | OUTPATIENT
Start: 2023-09-19 | End: 2023-09-19 | Stop reason: HOSPADM

## 2023-09-19 RX ORDER — ACETAMINOPHEN 650 MG/1
650 SUPPOSITORY RECTAL EVERY 4 HOURS PRN
Status: DISCONTINUED | OUTPATIENT
Start: 2023-09-19 | End: 2023-09-19 | Stop reason: HOSPADM

## 2023-09-19 RX ADMIN — ONDANSETRON 4 MG: 4 TABLET, ORALLY DISINTEGRATING ORAL at 13:00

## 2023-09-19 RX ADMIN — MELATONIN 5 MG TABLET 5 MG: at 03:24

## 2023-09-19 RX ADMIN — DIAZEPAM 10 MG: 5 TABLET ORAL at 03:24

## 2023-09-19 RX ADMIN — DIAZEPAM 10 MG: 5 TABLET ORAL at 10:10

## 2023-09-19 ASSESSMENT — COLUMBIA-SUICIDE SEVERITY RATING SCALE - C-SSRS
ATTEMPT SINCE LAST CONTACT: NO
TOTAL  NUMBER OF ABORTED OR SELF INTERRUPTED ATTEMPTS SINCE LAST CONTACT: NO
TOTAL  NUMBER OF INTERRUPTED ATTEMPTS SINCE LAST CONTACT: NO
1. SINCE LAST CONTACT, HAVE YOU WISHED YOU WERE DEAD OR WISHED YOU COULD GO TO SLEEP AND NOT WAKE UP?: NO
SUICIDE, SINCE LAST CONTACT: NO
6. HAVE YOU EVER DONE ANYTHING, STARTED TO DO ANYTHING, OR PREPARED TO DO ANYTHING TO END YOUR LIFE?: NO
2. HAVE YOU ACTUALLY HAD ANY THOUGHTS OF KILLING YOURSELF?: NO

## 2023-09-19 ASSESSMENT — ACTIVITIES OF DAILY LIVING (ADL)
ADLS_ACUITY_SCORE: 35

## 2023-09-19 NOTE — ED PROVIDER NOTES
History     Chief Complaint:  Mental Health Problem, Withdrawal, and Nausea & Vomiting       HPI   Julita Fernandez is a 22 year old female with history of anxiety and depression presenting today with mental health concerns.  Reports that she has been consuming approximately 5 alcoholic beverages daily for the last 2 weeks.  This is in the setting of her parents being gone on vacation.  She decided to stop drinking alcohol and her last drink was last night.  Reports sporadic heavy alcohol use in the past and has been able to quit with having only mild withdrawals.  No history of withdrawal seizures.  Also reports daily use of marijuana.  No other illicit drug use.  States that she uses alcohol as a coping mechanism for uncontrolled anxiety and depression.  Desires to stop drinking alcohol as a coping mechanism and would like to discuss other options for anxiety and depression.  She has intermittent passive suicidal thoughts.  Has a history of self-harm with last being 1 month ago.  No homicidal thoughts.    Currently, she feels like her heart is racing.  Has nausea and vomiting.  Feels that her mouth is dry.  Denies hallucinations.  Attributes nausea and vomiting to her baseline anxiety rather than withdrawal.    Independent Historian:   None - Patient Only    Review of External Notes:   Reviewed family practice visit from today.    [facility] visit at [facility] on 8/27/23.    Medications:    hydrOXYzine (VISTARIL) 25 MG capsule  levonorgestrel (MIRENA) 20 mcg/24 hours (5 yrs) 52 mg IUD  LORazepam (ATIVAN) 0.5 MG tablet  nitroFURantoin macrocrystal-monohydrate (MACROBID) 100 MG capsule  nitroFURantoin macrocrystal-monohydrate (MACROBID) 100 MG capsule  pantoprazole (PROTONIX) 40 MG EC tablet  sertraline (ZOLOFT) 50 MG tablet      Past Medical History:    Past Medical History:   Diagnosis Date    Anxiety     Gastroesophageal reflux disease      Past Surgical History:    No past surgical history on file.     Physical Exam  "  Patient Vitals for the past 24 hrs:   BP Temp Temp src Pulse Resp SpO2 Height Weight   09/18/23 2030 133/85 97.9  F (36.6  C) Temporal 111 16 98 % -- --   09/18/23 2000 131/83 -- -- 99 -- 91 % -- --   09/18/23 1930 123/74 -- -- 102 -- 98 % -- --   09/18/23 1905 -- -- -- 108 -- 97 % -- --   09/18/23 1900 116/74 -- -- -- -- -- -- --   09/18/23 1752 (!) 165/92 98  F (36.7  C) Oral 115 16 95 % 1.626 m (5' 4\") 49 kg (108 lb)        Physical Exam  /85   Pulse 111   Temp 97.9  F (36.6  C) (Temporal)   Resp 16   Ht 1.626 m (5' 4\")   Wt 49 kg (108 lb)   LMP  (LMP Unknown)   SpO2 98%   BMI 18.54 kg/m     General: Appears thin.  Sitting upright in ED bed holding an emesis bag.  Accompanied by her boyfriend.  Head: Atraumatic. Normocephalic.  EENT: PERRL. EOMI. Dry mucus membranes.   CV: Mild resting tachycardia at 105 bpm and regular rhythm. No appreciable murmurs, rubs, or gallops.   Respiratory: Breathing comfortably on room air. Lungs clear to auscultation bilaterally without wheezes, rhonchi, or rales.  GI: Soft, non-distended. Non-tender abdomen. No rebound, rigidity, or guarding.   Msk: Extremities without tenderness to palpation or deformity.  Skin: Warm and dry. No rashes.  Neuro: Awake, alert, and conversant.  Mildly tremulous.  Psych: Appropriate mood and affect.    Emergency Department Course   Laboratory:  Labs Ordered and Resulted from Time of ED Arrival to Time of ED Departure   CBC WITH PLATELETS AND DIFFERENTIAL - Abnormal       Result Value    WBC Count 14.4 (*)     RBC Count 3.96      Hemoglobin 12.7      Hematocrit 38.2      MCV 97      MCH 32.1      MCHC 33.2      RDW 14.2      Platelet Count 260      % Neutrophils 86      % Lymphocytes 9      % Monocytes 5      % Eosinophils 0      % Basophils 0      % Immature Granulocytes 0      NRBCs per 100 WBC 0      Absolute Neutrophils 12.2 (*)     Absolute Lymphocytes 1.3      Absolute Monocytes 0.8      Absolute Eosinophils 0.0      Absolute " Basophils 0.1      Absolute Immature Granulocytes 0.1      Absolute NRBCs 0.0     COMPREHENSIVE METABOLIC PANEL - Abnormal    Sodium 142      Potassium 3.8      Chloride 101      Carbon Dioxide (CO2) 22      Anion Gap 19 (*)     Urea Nitrogen 9.9      Creatinine 0.60      Calcium 9.8      Glucose 91      Alkaline Phosphatase 122 (*)     AST 70 (*)     ALT 41      Protein Total 7.8      Albumin 5.0      Bilirubin Total 0.6      GFR Estimate >90     DRUG ABUSE SCREEN QUAL URINE - Abnormal    Amphetamines Urine Screen Negative      Barbituates Urine Screen Negative      Benzodiazepine Urine Screen Negative      Cannabinoids Urine Screen Positive (*)     Cocaine Urine Screen Negative      Fentanyl Qual Urine Screen Negative      Opiates Urine Screen Negative      PCP Urine Screen Negative     MAGNESIUM - Normal    Magnesium 1.8     ETHYL ALCOHOL LEVEL - Normal    Alcohol ethyl <0.01     HCG QUALITATIVE PREGNANCY - Normal    hCG Serum Qualitative Negative       Emergency Department Course & Assessments:  Interventions:  Medications   flumazenil (ROMAZICON) injection 0.2 mg (has no administration in time range)   melatonin tablet 5 mg (has no administration in time range)   diazepam (VALIUM) tablet 10 mg (has no administration in time range)   sodium chloride 0.9% BOLUS 1,000 mL (0 mLs Intravenous Stopped 9/18/23 1906)   ondansetron (ZOFRAN) injection 4 mg (4 mg Intravenous $Given 9/18/23 1759)   sodium chloride 0.9 % 1,000 mL with Infuvite Adult 10 mL, thiamine 100 mg, folic acid 1 mg, magnesium sulfate 2 g infusion ( Intravenous Stopped 9/18/23 2115)   LORazepam (ATIVAN) injection 1 mg (1 mg Intravenous $Given 9/18/23 2040)   ondansetron (ZOFRAN) injection 4 mg (4 mg Intravenous $Given 9/18/23 2058)      Assessments and Consultations:  Patient was seen in conjunction with attending physician Dr. Hancock.    1915   I performed my initial evaluation of the patient  ED Course as of 09/18/23 2135   Mon Sep 18, 2023   2125 Pt  feels improved. Palpitations have resolved and HR is down to 89.  Nausea gone after Zofran x2.       Independent Interpretation (X-rays, CTs, rhythm strip):  None    Social Determinants of Health affecting care:   Employment/Unemployment, Stress/Adjustment Disorders, and Social Connections/Isolation    Disposition:  The patient was transferred to [facility].     Impression & Plan    Medical Decision Making:  This is a 22-year-old female with history of alcohol abuse, marijuana abuse, and anxiety presenting today with concerns that she is in withdrawal.  Vital signs are notable for a mild elevation in her heart rate.  Differential diagnosis includes withdrawal versus anxiety.  It is hard to parse out which of her symptoms are from a withdrawal versus anxiety.  She is nauseous and vomiting, though attributes that more to her generalized anxiety than withdrawal.  She only required 1 dose of Ativan here with resolution of her tachycardia.  She had her nausea treated with Zofran as above.  Medically, she is clear.  Her urine drug screen is positive for cannabinoids, which she admitted to using, otherwise negative.  Alcohol level is undetectable.  Per her report, the symptoms seem almost more consistent with anxiety than withdrawal, and if withdrawal is playing a factor here, it is a very mild withdrawal.  Does experience passive suicidality but no plan.   We talked about options for treatment and the patient would like to go to the empath unit.  I think that this is a great place for her to be she will benefit immensely from being there.  Nausea is controlled.  Tachycardia resolved.  She has been able to eat and drink.  At this time, she is medically clear.     Diagnosis:    ICD-10-CM    1. Anxiety  F41.9       2. Alcohol abuse  F10.10       3. Marijuana abuse  F12.10               Tory Negron PA-C  09/18/23 2135

## 2023-09-19 NOTE — PROGRESS NOTES
Pt requested two linen changes overnight due to perspiration; pt stated that this is not a new occurrence for her.  She rated her anxiety as a 6.5/10, mild tremor noted, denies hallucinations.  CIWA score at 3:15am was 12.  Calm and pleasant in interactions with staff, makes needs known; cooperative with assessments/medications overnight.  Pt appeared to sleep comfortably under a weighted blanket in SR-A, respirations even/unlabored through the shift.

## 2023-09-19 NOTE — PROGRESS NOTES
Pt shared with writer that she feels good about discharging and that she is happy to have a plan to move forward.Pt continues to rest in sensory room A while waiting for discharge medications.

## 2023-09-19 NOTE — DISCHARGE INSTRUCTIONS
During your stay at [facility] the following resources were discussed with your therapist:     [email or web address]  Based on EMDR, Anxiety Release works by stimulating the brain with bilateral stimulation (alternating audio and/or visual stimuli ), which captures attention processes and diverts emotional resources. Because this kind of stimuli appeal to areas of the brain involved in sensory processing, the same areas of the brain responsible for anxiety, change tends to happen more easily than some other methods.  Here you will also find information about anxiety, who it effects, how it s produced by the brain and how to harness this knowledge to overcome anxiety.    [email or web address].emdria.[email or web address] - to find a therapist specializing in EMDR (Eye Movement Desensitization and Reprocessing), click on 'find an emdr therapist' at the top, and then you can filter for location, insurance, etc.    Aftercare Plan    Follow up with established providers and supports as scheduled. Continue taking medications as prescribed. Abstain from drugs and alcohol. Utilize your [facility] crisis team as needed. They are available 24/7. Contact information is listed below.     Consider taking time off school in order to focus on self care and your mental health.  Meeting regularly with your therapist, finding an EMDR therapist and attending intake assessment for day treatment program are all steps to take towards improving your mental health.     You have been scheduled with the   [facility] for a   Diagnostic Assessment appointment for the Day Treatment program.   Date: 9/28/2023   Time: 8AM   Please allow up to 90 -120 minutes for your appointment.   This is an IN-PERSON appointment.    [place] [address] [address]    *Follow the signs to the Emergency Room and park in the Yellow or Red Ramp.  You can obtain a reduced parking fee of $2 after your  appointment.  The office is located next to [facility].  The  office number is [phone].    Please arrive before you scheduled appointment time, late arrivals are subject to the need to reschedule.   Please bring contact names and phone numbers for Emergency Contacts, therapist, psychiatrist, PO, attorneys, or other relevant contacts that may be needed.    *Face masking is optional.    You will receive a phone call 2-4 days prior to your scheduled time to confirm and remind you of the appointment.      You will receive intake forms via [employer]send ([email or web address]) to be completed prior to your appointment.  If able, please complete this prior to your appointment to reduce the appt length of time.      If you need to change this appointment for any reason, please call our Behavioral Access Scheduling office at [phone].  Please note, we ask for at least a 24-hour notice.  Any late cancelations will be considered a no-show.     -----------------------------------------------------    Should you be interested in attending a Day Treatment program through [facility], you can contact them by calling     ----------------------------------------------------------------------------------------      If I am feeling unsafe or I am in a crisis, I will:   Contact my established care providers   Call the National Suicide Prevention Lifeline: 820.642.8888   Go to the nearest emergency room   Call 285     Warning signs that I or other people might notice when a crisis is developing for me: changes to sleep, appetite or mood, increased anger, agitation or irritability, feeling depressed or hopeless, spending more time alone or talking less, increased crying, decreased productivity, seeing or hearing things that aren't there, thoughts of not wanting to live anymore or of actually killing myself, thoughts of hurting others    Things I am able to do on my own to cope or help me feel better: watching a  favorite tv show or movie, listening to music I enjoy, going outside and breathing fresh air, going for a walk or exercising, taking a shower or bath, a cold or hot beverage, a healthy snack, drawing/coloring/painting, journaling, singing or dancing, deep breathing     I can try practicing square breathing when I begin to feel anxious - inhale through the nose for the count of 4 and the first line on the square. Exhale through the mouth for the count of 4 for the second line of the square. Repeat to complete the square. Repeat the square as many times as needed.    I can also use my five senses to practice mindfulness and grounding. What are five things I can see, four things I can hear, three things I can feel, two things I can smell, and one thing I can taste.     Things that I am able to do with others to cope or help me feel better: sometimes just talking or spending time with someone else, sharing a meal or having coffee, watching a movie or playing a game, going for a walk or exercising    I can also use community resources including mental health hotlines, [facility] crisis teams, or apps.     Things I can use or do for distraction: movies/tv, music, reading, games, drawing/coloring/painting or other art, essential oils, exercise, cleaning/organizing, puzzles, crossword puzzles, word search, Sudoku       I can also download a meditation or relaxation chiquita, like Calm, Headspace, or Insight Timer (all three offer a free version)    Changes I can make to support my mental health and wellness: Attend scheduled mental health therapy and psychiatric appointments. Take my medications as prescribed. Maintain a daily schedule/routine. Abstain from all mood altering substances, including drugs, alcohol, or medications not currently prescribed to me. Implement a self-care routine.      People in my life that I can ask for help: friends or family, trusted teachers/staff/colleagues, trusted members of my community or place of  "[employer], mental health crisis lines, or 911    Your [facility] has a mental health crisis team you can call 24/7: [facility], [phone]    Other things that are important when I m in crisis: to remember that the feelings I am having right now are temporary, and it won't feel like this forever, and that it is okay and important to ask for help    Crisis Lines  Crisis Text Line  Text 399229  You will be connected with a trained live crisis counselor to provide support.    National Hope Line  1.800.SUICIDE [9232429]      Community Resources  Fast Tracker  Linking people to mental health and substance use disorder resources  Soundwave.org     [place] Mental Health Warm Line  Peer to peer support  Monday thru Saturday, 12 pm to 10 pm  701.096.2860 or 0.824.842.4318  Text \"Support\" to 32112    National [place] on Mental Illness (NATALI)  460.619.8580 or 1.888.NATALI.HELPS      Mental Health Apps  My3  https://RadarFind.org/    VirtualHopeBox  https://VoltServerorg/apps/virtual-hope-box/      Additional Information  Today you were seen by a licensed mental health professional through Triage and Transition services, Behavioral [facility] Providers ([facility])  for a crisis assessment in the Emergency Department at [facility].  It is recommended that you follow up with your established providers (psychiatrist, mental health therapist, and/or primary care doctor - as relevant) as soon as possible. Coordinators from [facility] will be calling you in the next 24-48 hours to ensure that you have the resources you need.  You can also contact [facility] coordinators directly at [phone]. You may have been scheduled for or offered an appointment with a mental health provider. [facility] maintains an extensive network of licensed behavioral health providers to connect patients with the services they need.  We do not charge providers a fee to participate in our referral network.  We match patients with providers based on a " patient's specific needs, insurance coverage, and location.  Our first effort will be to refer you to a provider within your care system, and will utilize providers outside your care system as needed.

## 2023-09-19 NOTE — PLAN OF CARE
Patient agreeable to discharge plan. Discharge instructions reviewed with patient including follow-up care plan. Medications: reviewed and given to patient. Reviewed safety plan and outpatient resources. Denies SI and HI. All belongings that were brought into the hospital have been returned to patient. Escorted off the unit at 1603 accompanied by [facility] staff. Discharged home with anderson.

## 2023-09-19 NOTE — PROGRESS NOTES
"Triage & Transition Services [facility]     Progress Note    Patient: Julita goes by \"Julita,\" uses she/her pronouns  Date of Service: September 19, 2023  Site of Service:   Patient was seen in-person.     Presenting problem:  Please see initial [facility] Crisis Assessment completed by Wilman Lew psychotherapist on yesterday for complete assessment information. Notable concerns include increased use of etoh and cannabis to cope with worsening symptoms of depression and anxiety. .     Individuals Present: Julita & Jake [facility]    Session start: 12:45 PM    Session end: 1:30 PM    Session duration in minutes: 45  CPT utilized: 07840 - Psychotherapy (with patient) - 45 (38-52*) min    Current Presentation:   Appears to be feeling a bit better.  Continues to present with constricted affect but appears to have good insight and willingness to ask for help and follow recommendations.  Denies thoughts or intent for suicide, feeling ready to return home.     Crisis Assessment:  [place] Suicide Severity Rating Scale Since Last Contact: 09/19/23    Suicidal Ideation (Since Last Contact)  1. Wish to be Dead (Since Last Contact): No  2. Non-Specific Active Suicidal Thoughts (Since Last Contact): No  Suicidal Behavior (Since Last Contact)  Actual Attempt (Since Last Contact): No  Has subject engaged in non-suicidal self-injurious behavior? (Since Last Contact): No  Interrupted Attempts (Since Last Contact): No  Aborted or Self-Interrupted Attempt (Since Last Contact): No  Preparatory Acts or Behavior (Since Last Contact): No  Suicide (Since Last Contact): No     C-SSRS Risk (Since Last Contact)  Calculated C-SSRS Risk Score (Since Last Contact): No Risk Indicated       Mental Status Exam     Affect: Constricted   Appearance: Appropriate    Attention Span/Concentration: Attentive  Eye Contact: Engaged   Fund of Knowledge: Appropriate    Language /Speech Content: Fluent   Language /Speech Volume: Soft    Language " /Speech Rate/Productions: Normal    Recent Memory: Intact   Remote Memory: Intact   Mood: Depressed    Orientation to Person: Yes    Orientation to Place: Yes   Orientation to Time of Day: Yes    Orientation to Date: Yes    Situation (Do they understand why they are here?): Yes    Psychomotor Behavior: Normal    Thought Content: Clear   Thought Form: Intact     Diagnosis:     Anxiety disorder, unspecified type F41.9     Panic attack F41.0    Alcohol abuse, episodic drinking behavior        Therapeutic Intervention(s):   Provided active listening, unconditional positive regard, and validation. Engaged in safety planning.  Engaged in guided discovery, explored patient's perspectives and helped expand them through socratic dialogue. Coached on coping techniques/relaxation skills to help improve distress tolerance and managing intense emotions. Reviewed healthy living that supports positive mental health, including looking at sleep hygiene, regular movement, nutrition, and regular socialization. Provided positive reinforcement for progress towards goals, gains in knowledge, and application of skills previously taught.     Treatment Objective(s) Addressed:   The focus of this session was on rapport building, processing feelings related to feeling stuck and overhwhelmed.  We also engaged in, safety planning, identifying an appropriate aftercare plan, assessing safety, identifying treatment goals, and identifying additional supports.     Progress Towards Goals:   Patient reports improving symptoms. Patient is making progress towards treatment goals as evidenced by actively engaging with treatment team, agreeing to change in medication and scheduled appointment for day treatment.     Case Management:   Scheduled intake assessment for day treatment with [facility].  Provided resources for EMDR.      Plan and Clinical Summary and Substantiation of Recommendations:   Discharge: Pt appears to have good insight into ways in  which past experiences and trauma are currently impacting her mental health.  She came willing to the ED and [facility] and agrees with recommendation for day treatment and EMDR therapy.  She actively engaged in discussion around coping techniques and next steps for supporting herself in working on improved mental health.  Recommend return home with scheduled appointments for therapy and day treatment intake.  Pt is determined to keep herself safe, denying any current thoughts of suicide.     Attending concurs with disposition: Yes         JEREMY Styles

## 2023-09-19 NOTE — ED NOTES
[facility]  ED to [facility] Checklist:      Goal for EMPATH: Substance use and Anxiety management    Current Behavior: Calm and Cooperative    Safety Concerns: None    Legal Hold Status: Voluntary    Medically Cleared by ED provider: Yes    Patient Therapeutically Searched: Therapeutic search by ED staff (strings, belts, shoes, pockets, electronics, etc.)    Belongings: In room locker    Independent Ambulation at Baseline: Yes/No: Yes    Participates in Care/Conversation: Yes/No: Yes    Patient Informed about E[facility]: Yes/No: Yes    DEC: Ordered and completed    Patient Ready to be Transferred to EMP[facility]? Yes/No: Yes
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
## 2023-09-19 NOTE — PROGRESS NOTES
[facility] Progress Note    Client Name: Julita Fernandez  Date: September 19, 2023  Service Type:  Group Therapy  Facilitator: RANJEET Navarrete on 9/19/2023 at 10:03 AM      Response:  Patient did not participate in group.      RANJEET Navarrete

## 2023-09-19 NOTE — PLAN OF CARE
Julita Fernandez  September 19, 2023  Plan of Care Hand-off Note     Patient Care Path: observation    Plan for Care:   Pt reports chronic anxiety and depressive sxs dating back to childhood. She reports hx of trauma and has attempted to manage sxs with outpt therapy and PCP for medications. Pt also reports hx of using etoh heavily and intermittently for several years, and smoking marijuana mostly daily since age 17. Pt tested positive for cannabis today in ED. Pt reports intermittent SI as recent as yesterday, with no thoughts of method, plan or intent. Pt reports following sxs: excessive worry, racing thoughts, inability to control worry or thoughts, panic attacks happening almost daily, very poor sleep, nightmares, difficulty concentrating and intermittent SI. Pt denies HI or sxs of psychosis or umer. Pt reports that along with outpt therapy and PCP, she has attempted to cope with anxiety and panic by smoking marijuana and drinking heavily for stretches of weeks. She reports hx of trauma with following current stressors: she recently started college, and she had to move back home with her parents, where trauma took place in the past. With these newer stressors pt sxs have intensified to point where she feels hopeless and helpless. She recently started a drinking episode 2 weeks ago, and stopped yesterday, wanting more healthy means to cope. She became overwhelmed with anxiety and panic and came to ED for help.    Identified Goals and Safety Issues: Pt reports feeling desparate to get better sleep and to control her anxiety and panic. She reports hydroxezine has not been helpful, neither has trazedone. She reports intermittent SI and feels safe on EmPATH. She reports interest in EMDR. Pt also reports interest in getting set up with outpt psychiatry for medication management    Overview:  Tyler Mendez, pt anderson, [phone]          Legal Status: Legal Status at Admission: Voluntary/Patient has signed consent for  treatment    Psychiatry Consult: No, pt will meet with attending on Em[facility] tomorrow       Updated   regarding plan of care.         Wilman Lew

## 2023-09-19 NOTE — ED PROVIDER NOTES
EmPATH Unit - Psychiatry  Combined Observation Note and Discharge Summary  [facility] Emergency Department  Observation Initiation Date: Sep 18, 2023    Julita Fernandez MRN: [id number]   Age: 22 year old YOB: 2000     History     Chief Complaint   Patient presents with    Mental Health Problem    Withdrawal    Nausea & Vomiting     HPI  Julita Fernandez is a 22 year old female with a past history notable for a generalized anxiety disorder and presumed PTSD further complicated by alcohol and cannabis use.  She presented to the emergency department reporting a heightened state of anxiety in the context of heavy alcohol use.  She was determined to be medically stable and transferred to the EmPATH unit for psychiatric assessment.  She is now nearing 21 hours in the emergency department.  Overnight, there were no acute issues.  On examination today, the patient reports a recent traumatic experience which has led to presumed PTSD.  She described hypervigilance, a heightened state of anxiety, emotional reactivity, anticipatory anxiety around bedtime due to occasional nightmares, and avoidance behaviors.  Her primary focus today is ongoing anxiety, characterized as severe and impairing at times.  The intensity of her anxiety has led to increased alcohol use in hopes of subduing the intensity.  She also uses cannabis for the same reason.  She denied symptoms of a depressive episode.  She denied suicidal and homicidal thoughts.  She explains that she was recently prescribed Zoloft however had been utilizing as needed medications to help alleviate her anxiety and therefore did not start Zoloft.  Hydroxyzine was initially beneficial however the effects were very limited.  She was then changed over to lorazepam however she was using it often to help alleviate her anxiety and there was concern for the risk of dependence leading to discontinuation.  She is seeking alternative medications to help lessen her anxiety.   "She anticipates discharge home today after our meeting.      Past Medical History  Past Medical History:   Diagnosis Date    Anxiety     Gastroesophageal reflux disease      No past surgical history on file.  gabapentin (NEURONTIN) 300 MG capsule  pantoprazole (PROTONIX) 40 MG EC tablet  levonorgestrel (MIRENA) 20 mcg/24 hours (5 yrs) 52 mg IUD  nitroFURantoin macrocrystal-monohydrate (MACROBID) 100 MG capsule  sertraline (ZOLOFT) 50 MG tablet      Allergies   Allergen Reactions    Soy [Isoflavones (Soy)] Other (See Comments)     Facial eczema      Family History  No family history on file.  Social History   Social History     Tobacco Use    Smoking status: Never    Smokeless tobacco: Never   Vaping Use    Vaping Use: Former    Start date: 1/1/2020    Quit date: 5/1/2022    Substances: Nicotine, THC, Flavoring    Devices: Disposable, Pre-filled or refillable cartridge   Substance Use Topics    Alcohol use: Not Currently    Drug use: Yes     Frequency: 4.0 times per week     Types: Marijuana          Review of Systems  A medically appropriate review of systems was performed with pertinent positives and negatives noted in the HPI, and all other systems negative.    Physical Examination   BP: (!) 165/92  Pulse: 115  Temp: 98  F (36.7  C)  Resp: 16  Height: 162.6 cm (5' 4\")  Weight: 49 kg (108 lb)  SpO2: 95 %    Physical Exam  General: Appears stated age.   Neuro: Alert and fully oriented. Extremities appear to demonstrate normal strength on visual inspection.   Integumentary/Skin: no rash visualized, normal color    Psychiatric Examination   Appearance: awake, alert  Attitude:  cooperative  Eye Contact:  fair  Mood:  anxious and better  Affect:  appropriate and in normal range  Speech:  clear, coherent  Psychomotor Behavior:  no evidence of tardive dyskinesia, dystonia, or tics  Thought Process:  logical and linear  Associations:  no loose associations  Thought Content:  no evidence of suicidal ideation or homicidal " ideation and no evidence of psychotic thought  Insight:  fair  Judgement:  fair  Oriented to:  time, person, and place  Attention Span and Concentration:  fair  Recent and Remote Memory:  fair  Language: able to name/identify objects without impairment  Fund of Knowledge: intact with awareness of current and past events    ED Course     ED Course as of 09/19/23 1350   Mon Sep 18, 2023 2125 Pt feels improved. Palpitations have resolved and HR is down to 89.  Nausea gone after Zofran x2.   2209 Plan to transfer to [facility] unit.       Labs Ordered and Resulted from Time of ED Arrival to Time of ED Departure   CBC WITH PLATELETS AND DIFFERENTIAL - Abnormal       Result Value    WBC Count 14.4 (*)     RBC Count 3.96      Hemoglobin 12.7      Hematocrit 38.2      MCV 97      MCH 32.1      MCHC 33.2      RDW 14.2      Platelet Count 260      % Neutrophils 86      % Lymphocytes 9      % Monocytes 5      % Eosinophils 0      % Basophils 0      % Immature Granulocytes 0      NRBCs per 100 WBC 0      Absolute Neutrophils 12.2 (*)     Absolute Lymphocytes 1.3      Absolute Monocytes 0.8      Absolute Eosinophils 0.0      Absolute Basophils 0.1      Absolute Immature Granulocytes 0.1      Absolute NRBCs 0.0     COMPREHENSIVE METABOLIC PANEL - Abnormal    Sodium 142      Potassium 3.8      Chloride 101      Carbon Dioxide (CO2) 22      Anion Gap 19 (*)     Urea Nitrogen 9.9      Creatinine 0.60      Calcium 9.8      Glucose 91      Alkaline Phosphatase 122 (*)     AST 70 (*)     ALT 41      Protein Total 7.8      Albumin 5.0      Bilirubin Total 0.6      GFR Estimate >90     DRUG ABUSE SCREEN QUAL URINE - Abnormal    Amphetamines Urine Screen Negative      Barbituates Urine Screen Negative      Benzodiazepine Urine Screen Negative      Cannabinoids Urine Screen Positive (*)     Cocaine Urine Screen Negative      Fentanyl Qual Urine Screen Negative      Opiates Urine Screen Negative      PCP Urine Screen Negative     MAGNESIUM -  Normal    Magnesium 1.8     ETHYL ALCOHOL LEVEL - Normal    Alcohol ethyl <0.01     HCG QUALITATIVE PREGNANCY - Normal    hCG Serum Qualitative Negative         Assessments & Plan (with Medical Decision Making)   Patient presenting with a heightened state of anxiety in the context of a recent traumatic experience leading to increased alcohol use in addition to cannabis use.  Her treatment plan focused on optimizing antianxiety interventions while educating her on treatment modes at addressing PTSD related symptoms.. Nursing notes reviewed noting no acute issues.     I have reviewed the assessment completed by the [facility].     During the observation period, the patient did not require medications for agitation, and did not require restraints/seclusion for patient and/or provider safety.    The patient was found to have a psychiatric condition that would benefit from an observation stay in the emergency department for further psychiatric stabilization and/or coordination of a safe disposition. The observation plan includes serial assessments of psychiatric condition, potential administration of medications if indicated, further disposition pending the patient's psychiatric course during the monitoring period.     Preliminary diagnosis:    ICD-10-CM    1. Alcohol abuse  F10.10       2. Generalized anxiety disorder  F41.1       3. PTSD (post-traumatic stress disorder)  F43.10            Treatment Plan:  -The patient will begin using her home supply of Zoloft 50 mg daily targeting symptoms of MELANY and PTSD.  -Gabapentin will be initiated at a dose of 300 mg 3 times a day as needed for reduction of anxiety.  Risks and benefits were reviewed.  -She was educated on additional medications that can help alleviate PTSD related nightmares however she prefers to utilize melatonin as she has found benefit from that substance.  -Referral for intensive outpatient programming  -Resume outpatient individual psychotherapy and medication  management appointments  -Discharge home today.    After the period in observation care, the patient's circumstances and mental state were safe for outpatient management. After counseling on the diagnosis, work-up, and treatment plan, the patient was discharged. Close follow-up with a psychiatrist and/or therapist was recommended and community psychiatric resources were provided. Patient is to return to the ED if any urgent or potentially life-threatening concerns.      At the time of discharge, the patient's acute suicide risk was determined to be low due to the following factors: Reduction in the intensity of mood/anxiety symptoms that preceded the admission, denial of suicidal thoughts, denies feeling helpless or helpless, not currently under the influence of alcohol or illicit substances, denies experiencing command hallucinations, no immediate access to firearms. The patient's acute risk could be higher if noncompliant with their treatment plan, medications, follow-up appointments or using illicit substances or alcohol. Protective factors include: social supports  --  Russ Dickson MD   [facility] EMERGENCY DEPT  EmPATH Unit        Russ Dickson MD  09/19/23 9511

## 2023-09-19 NOTE — PROGRESS NOTES
"22 year old female with history of depression, anxiety, PTSD received from ED due to unmanageable extreme anxiety. Reports long history of anxiety and depression. Recently, the anxiety has gotten to the point where she cannot leave the house or attend school. Her parent's left on a trip and she decided to stop her ativan and \"just party a bunch\" but when parent's returned, she wanted to stop drinking in hopes to try to go back to school. Pt reports that she has cut down on marijuana use from 3x/day to once per day. However, today the marijuana did not help with the anxiety and she sought help.  Denies SI/HI.Pt is occasionally tearful, shaky hands and voice, visibly anxious. When asked about home life, pt states \"it's complicated and stressful\" but did not elaborate. Fiance at bedside in ED, supportive. Nursing and risk assessments completed. Assessments reviewed with LMHP and physician. Admission information reviewed with patient. Patient given a tour of EmPATH and instructions on using the facility. Questions regarding Em[facility] addressed. Pt safety search completed.     "

## 2023-09-19 NOTE — ED PROVIDER NOTES
Emergency Department Attending Supervision Note  9/18/2023  8:03 PM      I evaluated this patient in conjunction with an Advanced Practice Clinician:  Tory Negron PA-C      Briefly, the patient presented with a history of severe anxiety who had been using some as needed lorazepam 0.5 mg twice a day for her acute symptoms a number of weeks ago.  Patient was also prescribed sertraline which she has not started on yet.  Patient was concerned when the Ativan was being used 3 times a day, slightly more than prescribed, so she decided to stop it.  She believes that returning to school was an anxiety and stress trigger.  The patient's parents then went out of [facility] to [place] on vacation.  She has been home by herself for several weeks, with the exception of frequent visitation by her fiancé who is attending her in the room at this time.  The patient notes that she started drinking more alcohol, she mainly uses vodka.  On most evenings she would drink 5 or 6 alcohol containing beverages and some days she started drinking earlier in the afternoon.  The patient notes that she is able to stop using alcohol when she wishes.  Her last drink was yesterday.  Patient has not been taking the lorazepam again and has not started the sertraline.  The patient notes that this morning she woke up and was feeling a bit nauseous.  She decided not to have anything more to drink today.  She smoked marijuana which she does nearly daily hoping that this would improve her anxious symptoms and she noted that things were not really resolving.  She came into the emergency department for consultation and management.  The patient is interested in being seen in the empath unit.  Patient notes that given the nausea that she has had she really has not had anything significant to eat today.    Examination:   General: Resting comfortably on the gurney, she appears anxious, resting heart rate is about 105.  Head:  The scalp, face, and head appear  normal  Eyes:  The pupils are normal    Conjunctivae and sclera appear normal    No acute nystagmus  ENT:    The nose is normal    Ears/pinnae are normal  Neck:  Normal range of motion  MS:  Normal, no significant tremor noted at this time.  Skin:  No acute lesions noted  Psychiatric: Patient's anxiety is fairly high.  She is not suicidal or homicidal.  No hallucinations.  No psychotic features.    Independent XR interpretation:  None      Medical decision making:  This patient presents to the emergency department complaining of recent increased alcohol use, daily marijuana use, and a marked increase in anxiety having been prescribed sertraline but having not yet started it.  She has a long history of moderately severe anxiety.  She was recently on some as needed benzodiazepines until about 2 weeks ago.  Patient came into the emergency department feeling a sense of nauseous nests and vomiting.  She did not use alcohol today.  The patient appeared anxious on examination and was given 1 dose of lorazepam and felt much better.  There could be very mild alcohol withdrawal but this is not significant and would not require inpatient medical care in and of itself.  Benzodiazepine withdrawal is much less likely.  The patient's anxiety is significantly ramped up and she would like to go to the empath unit for consultation and further management.  She is medically clear at this time for transfer to [facility].  Her nausea has resolved and she is able to eat.  She was hydrated in the emergency department and given multivitamins intravenously.  Her urine drug screen is only positive for THC which she admits to using daily.    My impression/diagnosis is:  Anxiety  Alcohol abuse  Marijuana abuse  Mild alcohol induced hepatitis              Heber Hancock MD, CPE, FACEP, FAAEM  Emergency Physician         Heber Hancock MD  09/18/23 2112       Heber Hancock MD  09/18/23 2131

## 2023-09-20 ENCOUNTER — PATIENT OUTREACH (OUTPATIENT)
Dept: CARE COORDINATION | Facility: CLINIC | Age: 23
End: 2023-09-20
Payer: COMMERCIAL

## 2023-09-20 ENCOUNTER — TELEPHONE (OUTPATIENT)
Dept: FAMILY MEDICINE | Facility: CLINIC | Age: 23
End: 2023-09-20
Payer: COMMERCIAL

## 2023-09-20 LAB — BACTERIA UR CULT: ABNORMAL

## 2023-09-20 NOTE — TELEPHONE ENCOUNTER
Scheduled patient for Assessment with Jude Xie [facility], KATHY on 9/28/23 at 8:00 am. Patient interested in Outpatient programming with Sj.

## 2023-09-20 NOTE — TELEPHONE ENCOUNTER
Reason for Call:  Appointment Request    Patient requesting this type of appt:  Hospital/ED Follow-Up     Requested provider: Carolyn Ltaham    Reason patient unable to be scheduled: Not with their preferred provider    When does patient want to be seen/preferred time: 3-7 days    Comments: Following up with Dr. Latham as requested    Could we send this information to you in [employer] or would you prefer to receive a phone call?:   Patient would prefer a phone call   Okay to leave a detailed message?: Yes at Home number on file [phone] (home)    Call taken on 9/20/2023 at 11:43 AM by Mac Starr

## 2023-09-20 NOTE — PROGRESS NOTES
[facility]    Background: Transitional Care Management program identified per system criteria and reviewed by [facility] team for possible outreach.    Assessment: Upon chart review, [facility] Team member will not proceed with patient outreach related to this episode of Transitional Care Management program due to reason below:    Patient has active communication with a nurse, provider or care team for reason of post-hospital follow up plan.  Outreach call by [facility] team not indicated to minimize duplicative efforts.     Patient is in active communication today with their Primary Care Provider from [facility]. No [facility] outreach call needed at this time.    Plan: Transitional Care Management episode addressed appropriately per reason noted above.      TIMUR Castillo  [phone]  [facility]     *Connected Care Resource Team does NOT follow patient ongoing. Referrals are identified based on internal discharge reports and the outreach is to ensure patient has an understanding of their discharge instructions.

## 2023-09-20 NOTE — TELEPHONE ENCOUNTER
Patient called, stated she was just prescribed Gabapentin yesterday and took it within one hour of an antacid. Pt was wondering if she needed to do anything or be concerned.    I told her that antacids can reduce the absorption of some medications and Gabapentin being one of them. I told her to just take these at least two hours apart moving forward. Patient verbalized her understanding.      ESTEE Durant

## 2023-09-27 ASSESSMENT — PATIENT HEALTH QUESTIONNAIRE - PHQ9
10. IF YOU CHECKED OFF ANY PROBLEMS, HOW DIFFICULT HAVE THESE PROBLEMS MADE IT FOR YOU TO DO YOUR WORK, TAKE CARE OF THINGS AT HOME, OR GET ALONG WITH OTHER PEOPLE: EXTREMELY DIFFICULT
SUM OF ALL RESPONSES TO PHQ QUESTIONS 1-9: 18
SUM OF ALL RESPONSES TO PHQ QUESTIONS 1-9: 18

## 2023-09-28 ENCOUNTER — HOSPITAL ENCOUNTER (OUTPATIENT)
Dept: BEHAVIORAL HEALTH | Facility: CLINIC | Age: 23
Discharge: HOME | End: 2023-09-28
Attending: FAMILY MEDICINE | Admitting: FAMILY MEDICINE
Payer: COMMERCIAL

## 2023-09-28 PROCEDURE — 90791 PSYCH DIAGNOSTIC EVALUATION: CPT | Performed by: COUNSELOR

## 2023-09-28 ASSESSMENT — COLUMBIA-SUICIDE SEVERITY RATING SCALE - C-SSRS
4. HAVE YOU HAD THESE THOUGHTS AND HAD SOME INTENTION OF ACTING ON THEM?: NO
2. HAVE YOU ACTUALLY HAD ANY THOUGHTS OF KILLING YOURSELF IN THE PAST MONTH?: YES
3. HAVE YOU BEEN THINKING ABOUT HOW YOU MIGHT KILL YOURSELF?: NO
1. IN THE PAST MONTH, HAVE YOU WISHED YOU WERE DEAD OR WISHED YOU COULD GO TO SLEEP AND NOT WAKE UP?: YES
BASED ON RESPONSES TO C-SSRS QS 1-6, WHAT IS THE PATIENT'S OVERALL RISK RATING FOR SUICIDE: LOW RISK
5. HAVE YOU STARTED TO WORK OUT OR WORKED OUT THE DETAILS OF HOW TO KILL YOURSELF? DO YOU INTEND TO CARRY OUT THIS PLAN?: NO
6. HAVE YOU EVER DONE ANYTHING, STARTED TO DO ANYTHING, OR PREPARED TO DO ANYTHING TO END YOUR LIFE?: NO

## 2023-09-28 ASSESSMENT — PAIN SCALES - GENERAL: PAINLEVEL: NO PAIN (0)

## 2023-09-28 NOTE — PROGRESS NOTES
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 indicated by two (or more) of the following:     - Difficulty falling or staying asleep.      - Irritability or outbursts of anger.      - Difficulty concentrating.   E. Duration of the disturbance is more than 1 month.  F. The disturbance causes clinically significant distress or impairment in social, occupational, or other important areas of functioning.    Functional Status:  Patient reports the following functional impairments:   academic performance; health maintenance; home life; management of the household and or completion of tasks; organization; relationship(s); self care; social interactions .     Programmatic care:  Current LOCUS was assigned and patient needs the following level of care based on score 19  .    Clinical Summary:  1. Reason for assessment: due to worsening mental health symptoms, and suicidal ideations.    2. Psychosocial, Cultural and Contextual Factors: challenging interpersonal relationships, ongoing abuse of substances.  3. Principal DSM5 Diagnoses  (Sustained by DSM5 Criteria Listed Above):   296.32 (F33.1) Major Depressive Disorder, Recurrent Episode, Moderate _ and With  melancholic features  300.02 (F41.1) Generalized Anxiety Disorder.  4. Other Diagnoses that is relevant to services:   300.01 (F41.0) Panic Disorder  309.81 (F43.10) Posttraumatic Stress Disorder (includes Posttraumatic Stress Disorder for Children 6 Years and Younger)  With dissociative symptoms.  5. Provisional Diagnosis:  Substance-Related & Addictive Disorders Alcohol Use Disorder   305.00 (F10.10) Mild In a controlled environment  305.20 (F12.10) Cannabis Use Disorder Mild  In a controlled environment.  6. Prognosis: Expect Improvement and Relieve Acute Symptoms.  7. Likely consequences of symptoms if not treated: patient's ongoing symptoms are more than likely to get worse and experience a decreased daily in functioning and may require a higher level of care.  8. Client strengths include:  educated, goal-focused, good listener, has a previous history of therapy, insightful, intelligent, motivated, open to learning, open to suggestions / feedback, support of family, friends and providers, supportive, wants to learn, willing to ask questions, and willing to relate to others .     Recommendations:     1. Plan for Safety and Risk Management:   Safety and Risk: A safety and risk management plan has been developed including: Patient consented to co-developed safety plan.  Safety and risk management plan was completed - see below.  Patient agreed to use safety plan should any safety concerns arise.  A copy was given to the patient..          Report to child / adult protection services was NA.     2. Patient's identified no poncho / Buddhism / spiritual influences relevant to programming at this time.    3. Initial Treatment will focus on:   Depressed Mood -    Anxiety -    Functional Impairment at: home and school  Risk Management / Safety Concerns related to: Suicidal ideation  Alcohol / Substance Use -   .     4. Resources/Service Plan:    services are not indicated.   Modifications to assist  "communication are not indicated.   Additional disability accommodations are not indicated.      5. Collaboration:   Collaboration / coordination of treatment will be initiated with the following  support professionals: outpatient therapist and Targeted Case Management (TCM).      6.  Referrals:   The following referral(s) will be initiated: Outpatient Mental Health Therapy Group. Next Scheduled Appointment: Patient is currently on the wait list for programming.      A Release of Information has been obtained for the following: Targeted Case Management (TCM).  Emergency contact: MAX LAANSHULHIGINIO (Significant other)  phone#: [phone]       Clinical Substantiation/medical necessity for the above recommendations:  Patient is a 22-year-old  bisexual single female who presents with a history of Depression and anxiety. Patient reported symptoms began around the age of 18.  Patient is seeking services currently due to \" \"Anxiety, depression, Possible PTSD\".  Anxiety, Suicidal ideation, Depression, Substance use, Health stressors.   Pt reports long history of struggles with anxiety and depression. She reports she experienced significant trauma early in life and has worked with therapists on and off for several years. She reports she has been able to manage symptoms without therapy and PCP for medication. She also reports relying on EtOH for symptoms management, reporting intermittent periods of up to 3 months with daily drinking, followed by extended periods of sobriety. Pt reports that 2 weeks ago she started drinking again and yesterday decided to discontinue. She reports anxiety and panic have intensified. Pt reports primary current symptoms include excessive worry, racing thoughts, inability to control worry and thoughts, nightmares, panic attacks that last up to an hour, \"constant anxiety\" with limited coping means for relief. She reports smoking marijuana daily and that she wants to learn more healthy coping " "means. She started with her current therapist this summer and receives medication management via PCP. She denies current SI and endorses intermittent passive SI as recent as yesterday, with no thought of method, plan, or intent. Pt reports that working with therapist and PCP have not helped her to manage symptoms and she continues to use alcohol and marijuana to help her control symptoms. Pt reports symptoms of anxiety are intensifying to a point where she feels hopeless and helpless and is seeking a higher level of care, to help her stabilize. Patient has received mental health services in the past:  partial hospitalization program at [facility] age 18- 6 years ago; 2019).  Patient denies a history of civil commitment, or psychiatric hospitalization.  Currently, patient is receiving weekly therapy with Farideh Roman and [facility], [facility], and states that her PCP prescribes her current medications.        Patient endorses with Change in sleep, Lack of interest, change in appetite, Psychomotor slowing or agitation, Feelings of hopelessness, Feelings of helplessness, Low self-worth, Ruminations, Irritability, feeling sad, down, or depressed, Withdrawn, Poor hygiene, Frequent crying, Anger outbursts, and Self-injurious behavior sense of doom, difficulty concentrating, hopelessness, helplessness, thoughts of death/suicide (poor sleep). She has not been caring herself, used to be \"insane about her self-care,\" hasn't been doing her nails, skin care, showering. Has really struggled with her day to day, is behind in all her classes. She doesn't have the mental capacity to take care of herself or other aspects of her life.  Pt reports SI is just a thought in passing, nothing she contemplates and would never attempt). Excessive worry, Nervousness, Physical complaints, such as headaches, stomachaches, muscle tension, social anxiety, Fears/phobias being in car, Sleep disturbance, Psychomotor agitation, " Ruminations, Poor concentration, and Irritability. Palpitations, Tremors, Sense of impending doom, and Hot or cold flashes racing thoughts, excessive worry, shortness of breath or racing heart. Experienced traumatic event Trauma: Sexual, physical, and emotional by multiple different people, Reexperiencing of trauma, Avoids traumatic stimuli, Hypervigilance, Increased arousal, Impaired functioning, Nightmares, Dissociation, and flashbacks. Patient reported the following problems because of her substance use: academic; family problems; relationship problems. Substance Use: blackouts, vomiting, hangovers, and daily use.    Patient denies any past suicidal behaviors but admits to current suicidal ideations in the past month. She was engaged in safety planning and identified numerous protective factors. Patient agrees with referral to [facility] and was placed on the wait list for programming. Patient was provided outside psychiatry referral to manage her mental health symptoms. Patient's acute suicide risk was determined to be low due to the following factors: Admission of current suicidal ideations but has no past suicidal behaviors. Patient is not currently under the influence of alcohol or illicit substances, denies experiencing command hallucinations, and has no direct access to firearms. Patient's acute risk could be higher if noncompliant with treatment plan, medications, follow-up appointments or using illicit substances or alcohol.  Protective factors include forward or future oriented thinking; safe and stable environment; regular sleep; effectively controls impulses; regular physical activity; sense of belonging; purpose; abstinence from substances; structured day; effective problem-solving skills; strong sense of self-worth or esteem; access to a variety of clinical interventions and pets. Patient reports suicidal ideations in the past month and has no prior suicidal behaviors, and is not  "currently using illicit substances, therefore, a safety plan was developed. Patient instructed to present to her nearest emergency room if symptoms get worse.    7. FUAD:    FUAD:  Discussed the general effects of drugs and alcohol on health and well-being. Provider gave patient printed information about the effects of chemical use on her health and well being. Recommendations:  refrain from all mood altering substances not prescribed by physicians. .     8. Records:   These were reviewed at time of assessment.   Information in this assessment was obtained from the medical record and  provided by patient who is a fair historian.  Patient will have open access to her mental health medical record.    9.   Interactive Complexity: No    10. Safety Plan:  When the patient identifies the following:  Wish to die    The following is recommended:   Complete/Review/Update Safety Plan    Safety Plan:  Adult Long Safety Plan:     [facility]                                       Julita Fernandez     SAFETY PLAN:  Step 1: Warning signs / cues (Thoughts, images, mood, situation, behavior) that a crisis may be developing:  Thoughts: \"I don't matter\", \"People would be better off without me\", \"I'm a burden\", \"I can't do this anymore\", \"I just want this to end\", and \"Nothing makes it better\"  Images: obsessive thoughts of death or dying: ideations and flashbacks  Thinking Processes: ruminations (can't stop thinking about my problems): past, racing thoughts, intrusive thoughts (bothersome, unwanted thoughts that come out of nowhere):  , and highly critical and negative thoughts: of self  Mood: worsening depression, hopelessness, helplessness, and intense worry  Behaviors: isolating/withdrawing , using drugs, using alcohol, can't stop crying, not taking care of myself, not taking care of my responsibilities, and not sleeping enough  Situations: changes in symptoms: depression/anxiety, " "trauma , and relapse   Step 2: Coping strategies - Things I can do to take my mind off of my problems without contacting another person (relaxation technique, physical activity):  Distress Tolerance Strategies:  watch a funny movie: videos and hot shower, walks, hiking  Physical Activities: go for a walk and hiking  Focus on helpful thoughts:  \"This is temporary\", \"I will get through this\", \"It always passes\", and \"Ride the wave\"think about happy memories: I used be better and I want to get back to that place again  Step 3: People and social settings that provide distraction:   Name: nora barron Phone: NA   Name: NA Phone: NA   Name: NA Phone: NA  community    Step 4: Remind myself of people and things that are important to me and worth living for:  \"I have happy before, enjoying life and I want to get back to that\"      Step 5: When I am in crisis, I can ask these people to help me use my safety plan:   Name: nora barron or 988 (crisis line) Phone: NA   Name: NA Phone: NA   Name: NA Phone: NA  Step 6: Making the environment safe:   remove alcohol, remove drugs, and be around others  Step 7: Professionals or agencies I can contact during a crisis:  Suicide Prevention Lifeline: Call or Text 283   Local Crisis Services: [facility]  [phone]    Call 911 or go to my nearest emergency department.   I helped develop this safety plan and agree to use it when needed.  I have been given a copy of this plan.      Client signature _________________________________________________________________  Today s date:  9/28/2023  Completed by Provider Name/ Credentials:  RANJEET Abel/KATHY  September 28, 2023  Adapted from Safety Plan Template 2008 Hermelinda Ashley and Rachid Cain is reprinted with the express permission of the authors.  No portion of the Safety Plan Template may be reproduced without the express, written permission.  You can contact the authors at [email or web address] or " [email or web address].        Provider Name/ Credentials:  RANJEET Abel,  EVELIAC  Dual   Phone: [phone]  Fax: [phone]     September 28, 2023

## 2023-09-28 NOTE — ADDENDUM NOTE
Encounter addended by: Jude Xie, [facility], [facility] on: 9/28/2023 11:52 AM   Actions taken: Clinical Note Signed

## 2023-09-28 NOTE — PROGRESS NOTES
LOCUS Worksheet     Name: Julita Fernandez MRN: [id number]    : 2000      Gender:  female    PMI:  NA   Provider Name: [facility]   Provider NPI:  [id number]    Actual level of Care Provided:  therapy    Service(s) receiving or referred to:  IOP    Reason for Variance: due to worsening mental health symptoms, and suicidal ideations.    Rating completed by: Jude Xie LPCC/EVELIAC      I. Risk of Harm:   2      Low Risk of Harm    II. Functional Status:   3      Moderate Impairment    III. Co-Morbidity:   2      Minor Co-Morbidity    IV - A. Recovery Environment - Level of Stress:   3      Moderately Stress Environment    IV - B. Recovery Environment - Level of Support:   3      Limited Support in Environment    V. Treatment and Recovery History:   3      Moderate to Equivocal Response to Treatment and Recovery Management    VI. Engagement and Recovery Project:   3      Limited Engagement and Recovery       19 Composite Score    Level of Care Recommendation:   17 to 19       High Intensity Community Based Services

## 2023-10-03 ENCOUNTER — TELEPHONE (OUTPATIENT)
Dept: BEHAVIORAL HEALTH | Facility: CLINIC | Age: 23
End: 2023-10-03
Payer: COMMERCIAL

## 2023-10-03 NOTE — TELEPHONE ENCOUNTER
Writer contacted patient to discuss programming and current waitlist status; also wanted to provide patient an opportunity to ask questions regarding the program. Writer requested a call back to [phone].    RANJEET Briggs on 10/3/2023 at 10:23 AM

## 2023-10-06 ENCOUNTER — BEH TREATMENT PLAN (OUTPATIENT)
Dept: BEHAVIORAL HEALTH | Facility: CLINIC | Age: 23
End: 2023-10-06
Attending: PSYCHIATRY & NEUROLOGY

## 2023-10-06 ENCOUNTER — TELEPHONE (OUTPATIENT)
Dept: BEHAVIORAL HEALTH | Facility: CLINIC | Age: 23
End: 2023-10-06
Payer: COMMERCIAL

## 2023-10-06 ENCOUNTER — MYC MEDICAL ADVICE (OUTPATIENT)
Dept: BEHAVIORAL HEALTH | Facility: CLINIC | Age: 23
End: 2023-10-06
Payer: COMMERCIAL

## 2023-10-06 DIAGNOSIS — F41.9 ANXIETY DISORDER, UNSPECIFIED TYPE: Primary | ICD-10-CM

## 2023-10-06 NOTE — TELEPHONE ENCOUNTER
----- Message from Alysa Coughlin [facility] sent at 10/6/2023  1:09 PM CDT -----  Regarding: Patient starting DT 1 on 10/10  Adult Mental Health Programmatic Care Schedule Request    Patient Name: Julita Fernandez  Location of programming: [facility]  Start Date: 10/10/23      Adult Program Group: DT 1 General Mood Track [[id number]]  Schedule: M, T, Th  9am- 12 noon   9 hours per week for 12 weeks  Number of visits to be scheduled: 36 days  Attending Provider (MD):  Dr Heber Fuller.  Visit Type:  In-Person    Accommodations Needed: N/A  Alerts Identified/Substantiation: N/A  Consulted with Supervisor: N/A    Send to:   [UR BEH BCA ([id number])] ##ONLY if Start Date is determined##  NG 14 OBC's (BEH BEHAVIORAL OUTPATIENT ASSESSMENTS [25452])   Alysa Coughlin ([facility])  Genevieve Lin (PHP)  Alisson Remy

## 2023-10-06 NOTE — TELEPHONE ENCOUNTER
Writer and patient discussed programming and patient will start in DT 1 on 10/10.    Alysa Coughlin LPC on 10/6/2023 at 1:06 PM

## 2023-10-10 ENCOUNTER — HOSPITAL ENCOUNTER (OUTPATIENT)
Dept: BEHAVIORAL HEALTH | Facility: CLINIC | Age: 23
Discharge: HOME | End: 2023-10-10
Attending: PSYCHIATRY & NEUROLOGY
Payer: COMMERCIAL

## 2023-10-10 PROBLEM — F33.1 MDD (MAJOR DEPRESSIVE DISORDER), RECURRENT EPISODE, MODERATE (H): Status: ACTIVE | Noted: 2023-10-10

## 2023-10-10 PROCEDURE — H2012 BEHAV HLTH DAY TREAT, PER HR: HCPCS

## 2023-10-10 PROCEDURE — H2012 BEHAV HLTH DAY TREAT, PER HR: HCPCS | Performed by: SOCIAL WORKER

## 2023-10-10 NOTE — PROGRESS NOTES
"    Admission SBAR NOTE  Adult  Outpatient Programs          SITUATION:     Admission Date: 10/10/2023    Provider verified identity through the following two step process.  Patient provided: verbal spelling of full first and last name and Patient     Patient name: Julita Fernandez  Preferred name: Julita She/Her/Hers/Herself 22 year old  Diagnosis/-es (copy from [employer], including ICD-10):   296.32 (F33.1) Major Depressive Disorder, Recurrent Episode, Moderate _ and With melancholic features  300.02 (F41.1) Generalized Anxiety Disorder.  4. Other Diagnoses that is relevant to services:   300.01 (F41.0) Panic Disorder  309.81 (F43.10) Posttraumatic Stress Disorder (includes Posttraumatic Stress Disorder for Children 6 Years and Younger)  With dissociative symptoms.  5. Provisional Diagnosis:  Substance-Related & Addictive Disorders Alcohol Use Disorder   305.00 (F10.10) Mild In a controlled environment  305.20 (F12.10) Cannabis Use Disorder Mild  In a controlled environment.    Assigned Program/Track: DT-1    Reviewed patient's schedule and informed them of any variation due to holidays. yes    Does the patient have any planned absences and/or barriers to admission/treatment? yes  NOTE: impact of transportation, technology, childcare, work, or housing concerns.    Insurance: Payor: BLUE PLUS / Plan: [place] / Product Type: HMO /  Changes/Concerns: no    Does patient need an appointment with the program provider? yes  NOTE: If yes, please confirm/schedule provider visit.      BACKGROUND:     Patient's stated goal/reason for treatment (copy from ; confirm with patient): \"treatment of anxiety, depression, possible PTSD\"      ASSESSMENT:     Please consult  if any of the following concerns may impact admission/participation in program:     PHQ, MELANY and PROMIS done within 7 days OR send upon admission if over 7 days.      [place] Suicide Severity Rating (select Lifetime/Recent): [place] " Suicide Severity Rating Scale (Lifetime/Recent)      9/18/2023    10:49 PM 9/19/2023     3:50 AM 9/19/2023     3:54 AM 9/19/2023    10:27 AM 9/19/2023     2:36 PM 9/28/2023     8:00 AM   [place] Suicide Severity Rating (Lifetime/Recent)   Q1 Wish to be Dead (Lifetime)  No       Q2 Non-Specific Active Suicidal Thoughts (Lifetime)  Yes       Q1 Wished to be Dead (Past Month) no  no no no yes   Q2 Suicidal Thoughts (Past Month) no  yes yes no yes   Q3 Suicidal Thought Method no  no no no no   Q4 Suicidal Intent without Specific Plan no  no no no no   Q5 Suicide Intent with Specific Plan no  no no no no   Q6 Suicide Behavior (Lifetime) yes no  no  no   Within the Past 3 Months? no  no no no no   Level of Risk per Screen moderate risk  low risk low risk low risk low risk   Most Severe Ideation Rating (Past 1 Month)   1      Description of Most Severe Ideation (Past 1 Month)   passive thoughts about suicide with no plan, method or intent, intermittent and not current      Frequency (Past 1 Month)   1      Duration (Past 1 Month)   1      Controllability (Past 1 Month)   1      Deterrents (Past 1 Month)   1      Reasons for Ideation (Past 1 Month)   0      Actual Attempt (Lifetime)  N       Actual Attempt (Past 3 Months)   N      Has subject engaged in non-suicidal self-injurious behavior? (Lifetime)  Y       Has subject engaged in non-suicidal self-injurious behavior? (Past 3 Months)   Y      Interrupted Attempts (Lifetime)  N       Interrupted Attempts (Past 3 Months)   N      Aborted or Self-Interrupted Attempt (Lifetime)  N       Aborted or Self-Interrupted Attempt (Past 3 Months)   N      Preparatory Acts or Behavior (Lifetime)  N       Preparatory Acts or Behavior (Past 3 Months)   N      Calculated C-SSRS Risk Score (Lifetime/Recent)  No Risk Indicated No Risk Indicated          LOCUS completed for recommended level of care? yes  IOP: 17-19; PHP: 20-22     Copy/Paste current Safety Plan to the BEH TX PLAN  ENCOUNTER. yes    Safety status/concerns: No.     Substance use concerns: no  NOTE: if no substance use concerns, OK to delete below:     Patient reported last use of substances as: Pt uses cannabis daily.     Patient reports/presents withdrawal/intoxication concerns: no    Pertinent Medical/Nutritional concerns: no    Confirm Emergency Contact listed in the SnapShot/Demographics with patient and notify OBC if an update is required. Yes, explained dept protocol for contacting emergency contact(s).    Paper or Docusign requirements for ROIs, e-BERENICE, emergency contact, etc have been completed? yes  If not, do upon admission.     Does patient have FMLA or Short-Term Disability requests/plans? no    Care Providers/Medication Management Needs:     Does patient have a current community or other ealth provider prescribing medications for mental health? Yes - pt's PCP, Carolyn Latham      Psychiatric Provider (or PCP if managing  meds)/Name: Carolyn Latham  [facility] name[facility]location[facility]  Last appointment:  9/18/2023  Next appointment:  TBD     NOTE: Inform patients, program is temporary and we will not be transferring care. Patient's should continue to see their community provider.       Individual Therapist/Name:  Diane Nava  [facility] [facility]  Last appointment:  10/2/2023  Next appointment:  TBD; weekly or biweekly appts.     Patient will continue to see above provider while participating in program: yes        RECOMMENDATIONS:     Patient Admission Completed: yes    Care Team, referrals made/needed: no  PCP: Carolyn Latham  NOTE: Notify RN, as needed, to make internal referrals.                                                             Completed by: YANI Ridley

## 2023-10-10 NOTE — GROUP NOTE
Psychoeducation Group Note    PATIENT'S NAME: Julita Fernandez  MRN:   [id number]  :   2000  ACCT. NUMBER: [id number]  DATE OF SERVICE: 10/10/23  START TIME:  9:00 AM  END TIME:  9:50 AM  FACILITATOR: Oriana Sebastian RN  TOPIC:  Wellness Group: Health Maintenance  [facility] Adult Mental [facility] Day Treatment  TRACK: 5a    NUMBER OF PARTICIPANTS: 5    Summary of Group / Topics Discussed:  Health Maintenance: Wellness Check-in: Patients met with group facilitator to individually review a holistic wellness check-in to assess patient medication adherence/concerns, appointments, physical and mental health, exercise, nutrition, sleep, socialization, substance use, and need for service/resource referrals.       Patient Session Goals / Objectives:  Discussed various aspects of health management and self-care related to physical and mental health  Demonstrated increased self-awareness of current wellness needs  Developed health literacy skills in navigating the healthcare system and self-advocacy/communicating needs with health care team        Patient Participation / Response:  {OP MH PROGRESS NOTE PATIENT PARTICIPATION:355989}    {OP MH RN GROUP NOTE HEALTH MAINTENANCE:036488}    Treatment Plan:  Patient has {OP  PROGRAMMATIC TX PLAN STATUS:155975}    Oriana Sebastian RN

## 2023-10-10 NOTE — GROUP NOTE
Psychoeducation Group Note    PATIENT'S NAME: Julita Fernandez  MRN:   [id number]  :   2000  ACCT. NUMBER: [id number]  DATE OF SERVICE: 10/10/23  START TIME:  9:00 AM  END TIME:  9:50 AM  FACILITATOR: Oriana Sebastian RN  TOPIC:  Wellness Group: Health Maintenance  [facility] Adult Mental Health Day Treatment  TRACK: 5a    NUMBER OF PARTICIPANTS: 5    Summary of Group / Topics Discussed:  Health Maintenance: Wellness Check-in: Patients met with group facilitator to individually review a holistic wellness check-in to assess patient medication adherence/concerns, appointments, physical and mental health, exercise, nutrition, sleep, socialization, substance use, and need for service/resource referrals.       Patient Session Goals / Objectives:  Discussed various aspects of health management and self-care related to physical and mental health  Demonstrated increased self-awareness of current wellness needs  Developed health literacy skills in navigating the healthcare system and self-advocacy/communicating needs with health care team        Patient Participation / Response:  Fully participated with the group by sharing personal reflections / insights and openly received / provided feedback with other participants.    Identified / Expressed personal readiness to practice skills    Treatment Plan:  Patient has a current master individualized treatment plan.  See Epic treatment plan for more information.    Oriana Sebastian RN

## 2023-10-10 NOTE — PROGRESS NOTES
"RN Review of Medical History / Physical Health Screen  Outpatient Mental Health Programs - [facility] Adult Mental Health Day Treatment    PATIENT'S NAME: Julita Fernandez  Preferred name: Julita   22 year old  MRN:   [id number]  :   2000  ACCT. NUMBER: [id number]  CURRENT AGE:  22 year old    DATE OF DIAGNOSTIC ASSESSMENT: 2023  DATE OF ADMISSION: 10/10/2023     Please see Diagnostic Assessment for additional Medical History.     General Health:   Have you had any exposure to any communicable disease in the past 2-3 weeks? no     Do you have a history of seizures?     If so, do you have a seizure plan? Known triggers?     Notify patient that we will call 911 (if virtual) or a code (if in-person), if we were to witness seizure during group. no    no  no    yes     Nutrition:    Are you on a special diet? If yes, please explain:  no   Do you have any concerns regarding your nutritional status? If yes, please explain:  no   Have you had any appetite changes in the last 3 months?  No     Have you had any weight loss or weight gain in the last 3 months?  No           Height/Weight Review:  Patient reported height:  5'4\"      Patient reports weight:  Date last checked:  14.4lb   10/10/23       Patient height and weight recorded by RN in epic flowsheet: No; Unable to measure  Programmatic Care currently provided via telehealth. All pt weights and heights will be collected through patient self-report and recorded in physical health screening progress note upon admission to the program.      BMI Review:  Was the patient informed of BMI? No.     Findings No Intervention         Fall Risk:   Have you had any falls in the past 3 months? yes \"dehydrated when running long distance and fainted\"     Do you currently use any assistive devices for mobility?   no      Does the patient have medication concerns? no     Was an MTM referral placed? no      Does the patient have any acute or chronic pain concerns " that might impact participation in the program? no       Additional Comments/Assessment: sees PCP for check ups    Per completion of the Medical History / Physical Health Screen, is there a recommendation to see / follow up with a primary care physician/clinic or dentist?             YANI Ridley/ Jocelin Sebastian RN  10/10/2023

## 2023-10-10 NOTE — PROGRESS NOTES
"The following is recommended:   Complete/Review/Update Safety Plan     Safety Plan:  Adult Long Safety Plan:      [facility]                                        Julita Fernandez               SAFETY PLAN:  Step 1: Warning signs / cues (Thoughts, images, mood, situation, behavior) that a crisis may be developing:  Thoughts: \"I don't matter\", \"People would be better off without me\", \"I'm a burden\", \"I can't do this anymore\", \"I just want this to end\", and \"Nothing makes it better\"  Images: obsessive thoughts of death or dying: ideations and flashbacks  Thinking Processes: ruminations (can't stop thinking about my problems): past, racing thoughts, intrusive thoughts (bothersome, unwanted thoughts that come out of nowhere):  , and highly critical and negative thoughts: of self  Mood: worsening depression, hopelessness, helplessness, and intense worry  Behaviors: isolating/withdrawing , using drugs, using alcohol, can't stop crying, not taking care of myself, not taking care of my responsibilities, and not sleeping enough  Situations: changes in symptoms: depression/anxiety, trauma , and relapse   Step 2: Coping strategies - Things I can do to take my mind off of my problems without contacting another person (relaxation technique, physical activity):  Distress Tolerance Strategies:  watch a funny movie: videos and hot shower, walks, hiking  Physical Activities: go for a walk and hiking  Focus on helpful thoughts:  \"This is temporary\", \"I will get through this\", \"It always passes\", and \"Ride the wave\"think about happy memories: I used be better and I want to get back to that place again  Step 3: People and social settings that provide distraction:                 Name: nora barron              Phone: NA                 Name: NA           Phone: NA                 Name: MARLEE           Phone: MARLEE  community    Step 4: Remind myself of people and things that are important to " "me and worth living for:  \"I have happy before, enjoying life and I want to get back to that\"        Step 5: When I am in crisis, I can ask these people to help me use my safety plan:                 Name: nora barron or 988 (crisis line)          Phone: NA                 Name: MARLEE           Phone: NA                 Name: MARLEE           Phone: NA  Step 6: Making the environment safe:   remove alcohol, remove drugs, and be around others  Step 7: Professionals or agencies I can contact during a crisis:  Suicide Prevention Lifeline: Call or Text 988   Local Crisis Services: [facility] Mobile Crisis  [phone]     Call 911 or go to my nearest emergency department.       I helped develop this safety plan and agree to use it when needed.  I have been given a copy of this plan.       Client signature _________________________________________________________________  Today s date:  9/28/2023  Completed by Provider Name/ Credentials:  RANJEET Abel/KATHY                    September 28, 2023  "

## 2023-10-11 NOTE — GROUP NOTE
Psychotherapy Group Note    PATIENT'S NAME: Julita Fernandez  MRN:   [id number]  :   2000  ACCT. NUMBER: [id number]  DATE OF SERVICE: 10/10/23  START TIME: 11:00 AM  END TIME: 11:50 AM  FACILITATOR: Corinne Randall LICSW  TOPIC:  EBP Group: Cognitive Restructuring  [facility] Adult Mental Health Day Treatment  TRACK: DT1    NUMBER OF PARTICIPANTS: 5    Summary of Group / Topics Discussed:  Cognitive Restructuring: Core Beliefs: Patients received an overview of what a core belief is, and how they develop. Patients then began to identify their negative core beliefs. Patients worked to modify core beliefs with the goal of improved self-image and functioning.     Patient Session Goals / Objectives:  Familiarize self with the concept of core beliefs and how they develop.    Explore personal core beliefs (positive and negative)  Develop / advance recognition of the connection between negative thoughts and negative core beliefs.  Formulate new neutral/positive core beliefs             Patient Participation / Response:  Fully participated with the group by sharing personal reflections / insights and openly received / provided feedback with other participants.    Demonstrated knowledge of personal thought patterns and how they impact their mood and behavior.    Treatment Plan:  Patient has a current master individualized treatment plan.  See Epic treatment plan for more information.    JEREMY Morillo

## 2023-10-11 NOTE — TELEPHONE ENCOUNTER
"  Medication Question or Refill    Contacts         Type Contact Phone/Fax    10/11/2023 11:57 AM CDT Phone (Incoming) Julita Fernandez (Self) [phone] (M)            What medication are you calling about (include dose and sig)?:   gabapentin (NEURONTIN) 300 MG capsule 30 capsul       Preferred Pharmacy:   [facility]/pharmacy #[id number] - [address]  [address] [place] [address] E  [facility] [address]  Phone: [phone] Fax: [phone]      Controlled Substance Agreement on file:   CSA -- Patient Level:    CSA: None found at the patient level.       Who prescribed the medication?: Dr. Solano Andmayito Benito prescribed    Do you need a refill? Yes    When did you use the medication last?  \"2 days ago\"      Patient offered an appointment? Yes: will schedule    Do you have any questions or concerns?  No      Could we send this information to you in [facility] or would you prefer to receive a phone call?:   Patient would prefer a phone call   Okay to leave a detailed message?: Yes at Cell number on file:    Telephone Information:   Mobile [phone]     "

## 2023-10-11 NOTE — GROUP NOTE
Process Group Note    PATIENT'S NAME: Julita Fernandez  MRN:   [id number]  :   2000  ACCT. NUMBER: [id number]  DATE OF SERVICE: 10/10/23  START TIME: 10:00 AM  END TIME: 10:50 AM  FACILITATOR: Alem Bautista, LGSW; Corinne Randall, [facility] HospitalSW  TOPIC:  Process Group    Diagnoses:  296.32 (F33.1) Major Depressive Disorder, Recurrent Episode, Moderate _ and With melancholic features  300.02 (F41.1) Generalized Anxiety Disorder.  4. Other Diagnoses that is relevant to services:   300.01 (F41.0) Panic Disorder  309.81 (F43.10) Posttraumatic Stress Disorder (includes Posttraumatic Stress Disorder for Children 6 Years and Younger)  With dissociative symptoms.  5. Provisional Diagnosis:  Substance-Related & Addictive Disorders Alcohol Use Disorder   305.00 (F10.10) Mild In a controlled environment  305.20 (F12.10) Cannabis Use Disorder Mild  In a controlled environment.    [facility]  TRACK:     NUMBER OF PARTICIPANTS: 5        Data:    Session content: At the start of this group, patients were invited to check in by identifying themselves, describing their current emotional status, and identifying issues to address in this group.   Area(s) of treatment focus addressed in this session included Symptom Management, Personal Safety, and Community Resources/Discharge Planning.  Julita took time to report feeling overwhelmed and anxious about starting the program.   She reported goal of trying to exercise and gt out of the house more.  She reported often getting into her car then not being able to get out of the car when she gets to her destination.  She reported decreased cannabis use to once per day and shared that this is going well.  She is taking medications as planned.  She was grateful to having arrived at the program today.  Client denied suicidal ideation, intent and plan.      Therapeutic Interventions/Treatment Strategies:  Psychotherapist offered support, feedback and  validation and reinforced use of skills. Treatment modalities used include Cognitive Behavioral Therapy. Interventions include Coping Skills: Reviewed patients current calming practices and discussed a more formal way of practicing and accessing skills.    Assessment:    Patient response:   Patient responded to session by listening and focusing on goals    Possible barriers to participation / learning include: and no barriers identified    Health Issues:   None reported       Substance Use Review:   Substance Use: Last use: cannabis.   Decreased to once per day.    Mental Status/Behavioral Observations  Appearance:   Appropriate   Eye Contact:   Fair   Psychomotor Behavior: Normal   Attitude:   Cooperative   Orientation:   All  Speech   Rate / Production: Normal    Volume:  Normal   Mood:    Anxious  Depressed   Affect:    Appropriate   Thought Content:   Clear  Thought Form:  Coherent  Logical     Insight:    Good     Plan:   Safety Plan: No current safety concerns identified.  Recommended that patient call 911 or go to the local ED should there be a change in any of these risk factors.   Barriers to treatment: None identified  Patient Contracts (see media tab):  None  Substance Use: Not addressed in session   Continue or Discharge: Patient will continue in Adult Day Treatment (ADT)  as planned. Patient is likely to benefit from learning and using skills as they work toward the goals identified in their treatment plan.      Corinne Randall, [facility]  October 11, 2023

## 2023-10-12 ENCOUNTER — HOSPITAL ENCOUNTER (OUTPATIENT)
Dept: BEHAVIORAL HEALTH | Facility: CLINIC | Age: 23
Discharge: HOME | End: 2023-10-12
Attending: PSYCHIATRY & NEUROLOGY
Payer: COMMERCIAL

## 2023-10-12 PROCEDURE — H2012 BEHAV HLTH DAY TREAT, PER HR: HCPCS

## 2023-10-12 PROCEDURE — H2012 BEHAV HLTH DAY TREAT, PER HR: HCPCS | Performed by: OCCUPATIONAL THERAPIST

## 2023-10-12 RX ORDER — GABAPENTIN 300 MG/1
300 CAPSULE ORAL 3 TIMES DAILY PRN
Qty: 60 CAPSULE | Refills: 1 | Status: SHIPPED | OUTPATIENT
Start: 2023-10-12 | End: 2023-11-20

## 2023-10-12 NOTE — GROUP NOTE
Psychoeducation Group Note    PATIENT'S NAME: Julita Fernandez  MRN:   [id number]  :   2000  ACCT. NUMBER: [id number]  DATE OF SERVICE: 10/12/23  START TIME:  9:00 AM  END TIME:  9:50 AM  FACILITATOR: Jennifer Ladd OTR  TOPIC:  Life Skills Group: Life Skills  [facility] Adult Mental Health Day Treatment  TRACK: DT 1    NUMBER OF PARTICIPANTS: 6    Summary of Group / Topics Discussed:  Behavior Activation - Life Skills and Coping Skills Practice Provided opportunity for patients to independently choose and engage in a therapeutic activity that supports progress towards their goals and psychiatric recovery. Provides a context for patients to monitor their symptoms, gain self-awareness, practice skills (self-regulation, mindfulness, self-talk, focus/concentration, social, productivity), build a sense of self-efficacy and mastery, as well as receive validation, support, and resources.  Discussions included reviewing the benefits of making behavioral changes, and the barriers to doing so.  Specific skills introduced: taking small steps, increasing motivation, decreasing procrastination and withdrawal.     Patient Session Goals / Objectives:   Independently identify a purposeful and meaningful therapeutic activity.   Identified which goal(s) they are intentionally working on during session.    Reflected on their performance and share insight about their progress.   Practiced and identified a way to generalize a skill to their everyday life      Patient Participation / Response:  Moderately participated, reserved manner but shared some personal reflections / insights.  Affect was anxious.  Demonstrated understanding of content through structured skill practice and verbal participation in group discussion. Utilized work time to engage in creative expression task suggested by a peer.     Treatment Plan:  Patient has an initial individualized treatment plan that was created as part of their diagnostic  assessment / admission process.  A master individualized treatment plan is in the process of being developed with the patient and multi-disciplinary care team.    Jennifer Ladd, ADRIENR

## 2023-10-12 NOTE — GROUP NOTE
Psychotherapy Group Note    PATIENT'S NAME: Julita Fernandez  MRN:   [id number]  :   2000  ACCT. NUMBER: [id number]  DATE OF SERVICE: 10/12/23  START TIME: 11:00 AM  END TIME: 11:50 AM  FACILITATOR: Corinne Randall, ROSSW; Alem Bautista LGSW  TOPIC:  EBP Group: Relationship Skills  [facility] Mental Health Day Treatment  TRACK: DT1    NUMBER OF PARTICIPANTS: 6    Summary of Group / Topics Discussed:  Relationship Skills: Relationship Mapping: Patients identified different types of relationships they have in their life and examined if there is conflict or closeness, the degree of conflict or closeness, and the reason for conflict. The goal of this topic is to help patients gain awareness of the relationships they have with others, identify types of conflict in patients  lives and how they impact symptoms/functioning, and identify how they can improve relationships with relationship and interpersonal skills they have learned.      Patient Session Goals / Objectives:  Familiarized patients with awareness to the different types of relationships they may have with different people and substances in their lives  Discussed and practiced strategies to promote healthier understanding of interpersonal relationships with a focus on awareness of conflict, the causes of conflict, and the ways to transform conflict  Discussed the use of substances and its impact on their relationships  Reviewed ways to increase comfort in asking for help.      Patient Participation / Response:  Moderately participated, sharing some personal reflections / insights and adequately adequately received / provided feedback with other participants.    Identified / Expressed personal readiness to incorporate effective communication skills and Practiced skills in session    Treatment Plan:  Patient has an initial individualized treatment plan that was created as part of their diagnostic assessment / admission process.  A master  individualized treatment plan is in the process of being developed with the patient and multi-disciplinary care team.    Alem Bautista, LGSW

## 2023-10-12 NOTE — GROUP NOTE
Process Group Note    PATIENT'S NAME: Julita Fernandez  MRN:   [id number]  :   2000  ACCT. NUMBER: [id number]  DATE OF SERVICE: 10/12/23  START TIME: 10:00 AM  END TIME: 10:50 AM  FACILITATOR: Corinne Randall, [facility] HospitalSW; Alem Bautista LGSW  TOPIC:  Process Group    Diagnoses:  296.32 (F33.1) Major Depressive Disorder, Recurrent Episode, Moderate _ and With melancholic features  300.02 (F41.1) Generalized Anxiety Disorder.  4. Other Diagnoses that is relevant to services:   300.01 (F41.0) Panic Disorder  309.81 (F43.10) Posttraumatic Stress Disorder (includes Posttraumatic Stress Disorder for Children 6 Years and Younger)  With dissociative symptoms.  5. Provisional Diagnosis:  Substance-Related & Addictive Disorders Alcohol Use Disorder   305.00 (F10.10) Mild In a controlled environment  305.20 (F12.10) Cannabis Use Disorder Mild  In a controlled environment.    [facility] Treatment  TRACK: DT1    NUMBER OF PARTICIPANTS: 6        Data:     Session content: At the start of this group, patients were invited to check in by identifying themselves, describing their current emotional status, and identifying issues to address in this group.   Area(s) of treatment focus addressed in this session included Symptom Management, Personal Safety, and Community Resources/Discharge Planning.    Client stated she felt anxious and overwhelmed. Client stated the overwhelming feelings were not super bad. Client stated her goal is to go to her college and withdraw from classes. She stated that right now she cannot attend classes due to finding them too stressful.Client stated she would use the skill of acting opposite to emotion for achieving her goal. Client stated a barrier to this goal is feeling anxious all the time. Client stated she is looking forward to walking her dogs later today. Client denied any passive suicidal ideation, intent, or plan. Client stated she had some self-injurious behavior  thoughts but no intent to act on them.      Therapeutic Interventions/Treatment Strategies:  Psychotherapist offered support, feedback and validation and reinforced use of skills. Treatment modalities used include Cognitive Behavioral Therapy and Dialectical Behavioral Therapy. Interventions include Behavioral Activation: Explored how behaviors effect mood and interact with thoughts and feelings.    Assessment:    Patient response:   Patient responded to session by accepting feedback and being attentive    Possible barriers to participation / learning include: and no barriers identified    Health Issues:   None reported       Substance Use Review:   Substance Use: No active concerns identified.    Mental Status/Behavioral Observations  Appearance:   Appropriate   Eye Contact:   Good   Psychomotor Behavior: Normal   Attitude:   Cooperative  Guarded  Attentive  Orientation:   All  Speech   Rate / Production: Normal/ Responsive Normal    Volume:  Normal   Mood:    Anxious   Affect:    Appropriate  a little withdrawn   Thought Content:   Clear  Thought Form:  Coherent  Logical     Insight:    Good     Plan:   Safety Plan: No current safety concerns identified.  Recommended that patient call 911 or go to the local ED should there be a change in any of these risk factors.   Barriers to treatment: None identified  Patient Contracts (see media tab):  None  Substance Use: Not addressed in session   Continue or Discharge: Patient will continue in Adult Day Treatment (ADT)  as planned. Patient is likely to benefit from learning and using skills as they work toward the goals identified in their treatment plan.      Alem Bautista, [facility]  October 12, 2023

## 2023-10-16 ENCOUNTER — HOSPITAL ENCOUNTER (OUTPATIENT)
Dept: BEHAVIORAL HEALTH | Facility: CLINIC | Age: 23
Discharge: HOME | End: 2023-10-16
Attending: PSYCHIATRY & NEUROLOGY
Payer: COMMERCIAL

## 2023-10-16 PROCEDURE — H0035 MH PARTIAL HOSP TX UNDER 24H: HCPCS

## 2023-10-16 PROCEDURE — H2012 BEHAV HLTH DAY TREAT, PER HR: HCPCS

## 2023-10-16 NOTE — GROUP NOTE
Psychoeducation Group Note    PATIENT'S NAME: Julita Fernandez  MRN:   [id number]  :   2000  ACCT. NUMBER: [id number]  DATE OF SERVICE: 10/16/23  START TIME:  9:00 AM  END TIME:  9:50 AM  FACILITATOR: Elaine Taylor RN  TOPIC:  Wellness Group: Medication Education and Management  [facility] Day Treatment  TRACK: 5A    NUMBER OF PARTICIPANTS: 6    Summary of Group / Topics Discussed:  Medication Educations and Management:  Medication Jeopardy (Part 1 of 2): Patients provided education regarding medication safety, antidepressants, side effects, neuroleptics, expected medication outcomes, knowledge of diagnosis, symptoms, and symptom management through an engaging jeopardy-style format.     Patient Session Goals / Objectives:  Participated in team-based Jeopardy game  Identified strategies for safe use, handling, and disposal of medications  Discussed basic aspects of medication safety, side effects, adverse outcomes and contraindications      Patient Participation / Response:  Moderately participated, sharing some personal reflections / insights and adequately adequately received / provided feedback with other participants.     Verbalized understanding of medication education and management topic    Treatment Plan:  Patient has a current master individualized treatment plan.  See Epic treatment plan for more information.    Elaine Taylor, RN

## 2023-10-16 NOTE — GROUP NOTE
"Process Group Note    PATIENT'S NAME: Julita Fernandez  MRN:   [id number]  :   2000  ACCT. NUMBER: [id number]  DATE OF SERVICE: 10/16/23  START TIME: 10:00 AM  END TIME: 11:00 AM  FACILITATOR: oCdy Wyatt  TOPIC:  Process Group    Diagnoses:  96.32 (F33.1) Major Depressive Disorder, Recurrent Episode, Moderate _ and With melancholic features  300.02 (F41.1) Generalized Anxiety Disorder.  4. Other Diagnoses that is relevant to services:   300.01 (F41.0) Panic Disorder  309.81 (F43.10) Posttraumatic Stress Disorder (includes Posttraumatic Stress Disorder for Children 6 Years and Younger)  With dissociative symptoms.  5. Provisional Diagnosis:  Substance-Related & Addictive Disorders Alcohol Use Disorder   305.00 (F10.10) Mild In a controlled environment  305.20 (F12.10) Cannabis Use Disorder Mild  In a controlled environment.    [facility]  TRACK: DT 1 GM    NUMBER OF PARTICIPANTS: 7        Data:    Session content: At the start of this group, patients were invited to check in by identifying themselves, describing their current emotional status, and identifying issues to address in this group.   Area(s) of treatment focus addressed in this session included Symptom Management and Personal Safety.  Patient reported feeling \"content.\"   Patient discussed working toward maintaining self-care and having a routine.   Skills they plan to practice addressing the goal include making time and self-care.   They identified a potential barrier as been sleeping a lot and not wanting to get up.   Patient reported no safety concerns, some ideation of SIB over on the weekend, nothing on her mind today, no action or plan to act on it.   Patient reported substance use smoking weed everyday and today she did not smoke it.   Patient reported taking medications as prescribed  Patient reported feeling proud/grateful for being in group and having access to therapy.     Therapeutic " Interventions/Treatment Strategies:  Psychotherapist offered support, feedback and validation. Treatment modalities used include Cognitive Behavioral Therapy. Interventions include Mindfulness: Encouraged a plan to use mindfulness skills in daily life.    Assessment:    Patient response:   Patient responded to session by accepting feedback, giving feedback, listening, focusing on goals, being attentive, and accepting support    Possible barriers to participation / learning include: and no barriers identified    Health Issues:   None reported       Substance Use Review:   Substance Use: cannabis .     Mental Status/Behavioral Observations  Appearance:   Appropriate   Eye Contact:   Good   Psychomotor Behavior: Normal   Attitude:   Cooperative   Orientation:   All  Speech   Rate / Production: Normal    Volume:  Normal   Mood:    Anxious   Affect:    Appropriate   Thought Content:   Clear  Thought Form:  Coherent  Logical     Insight:    Good     Plan:   Safety Plan: Committed to safety and agreed to follow previously developed safety coping plan.   No current safety concerns identified.  Recommended that patient call 911 or go to the local ED should there be a change in any of these risk factors.   Barriers to treatment: None identified  Patient Contracts (see media tab):  None  Substance Use: Provided encouragement towards sobriety   Continue or Discharge: Patient will continue in Adult Day Treatment (ADT)  as planned. Patient is likely to benefit from learning and using skills as they work toward the goals identified in their treatment plan.      Cody Pulido [facility] October 16, 2023

## 2023-10-17 ENCOUNTER — HOSPITAL ENCOUNTER (OUTPATIENT)
Dept: BEHAVIORAL HEALTH | Facility: CLINIC | Age: 23
Discharge: HOME | End: 2023-10-17
Attending: PSYCHIATRY & NEUROLOGY
Payer: COMMERCIAL

## 2023-10-17 ENCOUNTER — TELEPHONE (OUTPATIENT)
Dept: BEHAVIORAL HEALTH | Facility: CLINIC | Age: 23
End: 2023-10-17
Payer: COMMERCIAL

## 2023-10-17 PROCEDURE — H2012 BEHAV HLTH DAY TREAT, PER HR: HCPCS

## 2023-10-17 PROCEDURE — H0035 MH PARTIAL HOSP TX UNDER 24H: HCPCS

## 2023-10-17 NOTE — GROUP NOTE
Psychoeducation Group Note    PATIENT'S NAME: Julita Fernandez  MRN:   [id number]  :   2000  ACCT. NUMBER: [id number]  DATE OF SERVICE: 10/17/23  START TIME:  9:00 AM  END TIME:  9:50 AM  FACILITATOR: Elaine Taylor RN  TOPIC:  Wellness Group: Medication Education and Management  [facility] Day [facility]  TRACK: 5A - DT1    NUMBER OF PARTICIPANTS: 6    Summary of Group / Topics Discussed:  Medication Educations and Management:  Medication Jeopardy (Part 2 of 2): Patients provided education regarding medication safety, antidepressants, side effects, neuroleptics, expected medication outcomes, knowledge of diagnosis, symptoms, and symptom management through an engaging jeopardy-style format.     Patient Session Goals / Objectives:  Participated in team-based Jeopardy game  Identified strategies for safe use, handling, and disposal of medications  Discussed basic aspects of medication safety, side effects, adverse outcomes and contraindications      Patient Participation / Response:  Moderately participated, sharing some personal reflections / insights and adequately adequately received / provided feedback with other participants.     Verbalized understanding of medication education and management topic    Treatment Plan:  Patient has a current master individualized treatment plan.  See Epic treatment plan for more information.    Elaine Taylor, RN

## 2023-10-18 NOTE — GROUP NOTE
"Process Group Note    PATIENT'S NAME: Julita Fernandez  MRN:   [id number]  :   2000  ACCT. NUMBER: [id number]  DATE OF SERVICE: 10/17/23  START TIME: 10:00 AM  END TIME: 10:50 AM  FACILITATOR: Cody Wyatt  TOPIC:  Process Group    Diagnoses:  96.32 (F33.1) Major Depressive Disorder, Recurrent Episode, Moderate _ and With melancholic features  300.02 (F41.1) Generalized Anxiety Disorder.  4. Other Diagnoses that is relevant to services:   300.01 (F41.0) Panic Disorder  309.81 (F43.10) Posttraumatic Stress Disorder (includes Posttraumatic Stress Disorder for Children 6 Years and Younger)  With dissociative symptoms.  5. Provisional Diagnosis:  Substance-Related & Addictive Disorders Alcohol Use Disorder   305.00 (F10.10) Mild In a controlled environment  305.20 (F12.10) Cannabis Use Disorder Mild  In a controlled environment.    [facility] Day Treatment  TRACK: DT 1 GM    NUMBER OF PARTICIPANTS: 6        Data:    Session content: At the start of this group, patients were invited to check in by identifying themselves, describing their current emotional status, and identifying issues to address in this group.   Area(s) of treatment focus addressed in this session included Symptom Management and Personal Safety.  Patient reported feeling  anxious.   Patient discussed working toward trying to let go of things she cannot control.   Skills they plan to practice addressing the goal include \"not sure.\"  They identified a potential barrier as her thinking process.  Patient reported no safety concerns, some ideation of SIB, no plan or action to act on it.   Patient reported no substance use  Patient reported taking medications as prescribed  Patient reported feeling proud/grateful for ordering a new high heels.     Pt took process time to discuss how she is feeling about her mother planning her sister's tucker birthday, while she had the most traumatic experience at her tucker birthday. Pt " was put into an inpatient hospital by her mother on her birthday. Pt mentions she is working on making this year a positive birthday experience. Pt was receptive to feedback and support from her group.          Therapeutic Interventions/Treatment Strategies:  Psychotherapist offered support, feedback and validation. Treatment modalities used include Cognitive Behavioral Therapy. Interventions include Mindfulness: Encouraged a plan to use mindfulness skills in daily life.    Assessment:    Patient response:   Patient responded to session by accepting feedback, giving feedback, listening, focusing on goals, being attentive, and accepting support    Possible barriers to participation / learning include: and no barriers identified    Health Issues:   None reported       Substance Use Review:   Substance Use: No active concerns identified.    Mental Status/Behavioral Observations  Appearance:   Appropriate   Eye Contact:   Good   Psychomotor Behavior: Normal   Attitude:   Cooperative   Orientation:   All  Speech   Rate / Production: Emotional   Volume:  Normal   Mood:    Anxious  Depressed  Sad   Affect:    Tearful  Thought Content:   Clear  Thought Form:  Coherent  Logical     Insight:    Good     Plan:   Safety Plan: Committed to safety and agreed to follow previously developed safety coping plan.   No current safety concerns identified.  Recommended that patient call 911 or go to the local ED should there be a change in any of these risk factors.   Barriers to treatment: None identified  Patient Contracts (see media tab):  None  Substance Use: Provided support and affirmation for steps taken towards sobriety    Continue or Discharge: Patient will continue in Adult Day Treatment (ADT)  as planned. Patient is likely to benefit from learning and using skills as they work toward the goals identified in their treatment plan.      Cody Wyatt  October 18, 2023

## 2023-10-18 NOTE — GROUP NOTE
Psychotherapy Group Note    PATIENT'S NAME: Julita Fernandez  MRN:   [id number]  :   2000  ACCT. NUMBER: [id number]  DATE OF SERVICE: 10/17/23  START TIME: 11:00 AM  END TIME: 11:50 AM  FACILITATOR: Cody Wyatt  TOPIC: MH EBP Group: Specialty Awareness  [facility] Day Treatment  TRACK: DT 1 GM    NUMBER OF PARTICIPANTS: 6    Summary of Group / Topics Discussed:  Specialty Topics: Life Transitions: The topic of life transitions was presented in order to help patients to better understand the challenges presented by life transitions, and how to best navigate them. Exploring the phases of transition and how one works through them was discussed. Patients were provided with information regarding community resources.     Patient Session Goals / Objectives:  Discussed the timing and nature of major life transitions  Explored how life transitions may impact mental health and functioning  Discussed coping strategies to manage symptoms and help with transitioning  Discussed and planned a successful transition      Patient Participation / Response:  Moderately participated, sharing some personal reflections / insights and adequately adequately received / provided feedback with other participants.    Demonstrated understanding of topics discussed through group discussion and participation    Treatment Plan:  Patient has a current master individualized treatment plan.  See Epic treatment plan for more information.    Cody Wyatt

## 2023-10-19 ENCOUNTER — HOSPITAL ENCOUNTER (OUTPATIENT)
Dept: BEHAVIORAL HEALTH | Facility: CLINIC | Age: 23
Discharge: HOME | End: 2023-10-19
Attending: PSYCHIATRY & NEUROLOGY
Payer: COMMERCIAL

## 2023-10-19 PROCEDURE — H2012 BEHAV HLTH DAY TREAT, PER HR: HCPCS | Performed by: OCCUPATIONAL THERAPIST

## 2023-10-19 PROCEDURE — H2012 BEHAV HLTH DAY TREAT, PER HR: HCPCS | Performed by: SOCIAL WORKER

## 2023-10-19 NOTE — GROUP NOTE
Psychoeducation Group Note    PATIENT'S NAME: Julita Fernandez  MRN:   [id number]  :   2000  ACCT. NUMBER: [id number]  DATE OF SERVICE: 10/19/23  START TIME: 11:00 AM  END TIME: 11:50 AM  FACILITATOR: Nae Vaughn LICSW  TOPIC: MH Wellness Group: Health Maintenance  [facility] Adult Mental Health Day Treatment  TRACK: DT 1 (5A)    NUMBER OF PARTICIPANTS: 5    Summary of Group / Topics Discussed:  Health Maintenance: Weekend planning: Patients were given time to complete a weekend plan of what they will do to promote wellness and sobriety over the weekend when they do not have the structure of group. Patients were encouraged to review progress on their treatment goals and were challenged to identify ways to work toward meeting them. Patients identified and discussed foreseeable barriers to success over the weekend and then developed a plan to overcome them. Patients reviewed their distress coping skills and social support network and discussed this with the group.       Patient Session Goals / Objectives:    ?    Identified activities to engage in that promote balance in wellness  ?    Distinguished possible barriers to success over the weekend and created a plan to overcome them  ?    Listed distress coping skills and identified social support network to utilize if in crisis during the weekend        Patient Participation / Response:  Fully participated with the group by sharing personal reflections / insights and openly received / provided feedback with other participants.    Demonstrated understanding of topics discussed through group discussion and participation and Verbalized understanding of health maintenance topic    Treatment Plan:  Patient has a current master individualized treatment plan.  See Epic treatment plan for more information.    JEREMY Navarrete

## 2023-10-19 NOTE — GROUP NOTE
Process Group Note    PATIENT'S NAME: Julita Fernandez  MRN:   [id number]  :   2000  ACCT. NUMBER: [id number]  DATE OF SERVICE: 10/19/23  START TIME: 10:00 AM  END TIME: 10:50 AM  FACILITATOR: Nae Vaughn [facility]  TOPIC:  Process Group    Diagnoses:  96.32 (F33.1) Major Depressive Disorder, Recurrent Episode, Moderate _ and With melancholic features  300.02 (F41.1) Generalized Anxiety Disorder.  4. Other Diagnoses that is relevant to services:   300.01 (F41.0) Panic Disorder  309.81 (F43.10) Posttraumatic Stress Disorder (includes Posttraumatic Stress Disorder for Children 6 Years and Younger)  With dissociative symptoms.  5. Provisional Diagnosis:  Substance-Related & Addictive Disorders Alcohol Use Disorder   305.00 (F10.10) Mild In a controlled environment  305.20 (F12.10) Cannabis Use Disorder Mild  In a controlled environment.    [facility]  TRACK: DT 1 (5A)    NUMBER OF PARTICIPANTS: 6        Data:    Session content: At the start of this group, patients were invited to check in by identifying themselves, describing their current emotional status, and identifying issues to address in this group.   Area(s) of treatment focus addressed in this session included Symptom Management, Personal Safety, Community Resources/Discharge Planning, Abstinence/Relapse Prevention, Develop / Improve Independent Living Skills, and Develop Socialization / Interpersonal Relationship Skills.    Julita reports depressed mood with social isolation. Denies S/I or safety issues. She is making it a goal to be open to socially connecting with others. She is also monitoring self judgement with her self talk. Julita is using coping skills for symptom management. Julita is grateful that her cousin had a baby girl this morning.       Therapeutic Interventions/Treatment Strategies:  Psychotherapist offered support, feedback and validation and reinforced use of skills. Treatment modalities  used include Cognitive Behavioral Therapy.    Assessment:    Patient response:   Patient responded to session by accepting feedback, giving feedback, listening, focusing on goals, being attentive, accepting support, and verbalizing understanding    Possible barriers to participation / learning include: and no barriers identified    Health Issues:   None reported. Medication compliance.       Substance Use Review:   Substance Use: No active concerns identified.    Mental Status/Behavioral Observations  Appearance:   Appropriate   Eye Contact:   Good   Psychomotor Behavior: Normal   Attitude:   Cooperative  Interested  Orientation:   All  Speech   Rate / Production: Normal    Volume:  Normal   Mood:    Depressed   Affect:    Appropriate   Thought Content:   Clear and Safety denies any current safety concerns including suicidal ideation, self-harm, and homicidal ideation  Thought Form:  Coherent  Goal Directed  Logical     Insight:    Good     Plan:   Safety Plan: No current safety concerns identified.  Recommended that patient call 911 or go to the local ED should there be a change in any of these risk factors.   Barriers to treatment: None identified  Patient Contracts (see media tab):  None  Substance Use: Not addressed in session   Continue or Discharge: Patient will continue in Adult Day Treatment (ADT)  as planned. Patient is likely to benefit from learning and using skills as they work toward the goals identified in their treatment plan.      Nae Vaughn, [facility]  October 19, 2023

## 2023-10-19 NOTE — GROUP NOTE
Psychoeducation Group Note    PATIENT'S NAME: Julita Fernandez  MRN:   [id number]  :   2000  ACCT. NUMBER: [id number]  DATE OF SERVICE: 10/19/23  START TIME:  9:00 AM  END TIME:  9:50 AM  FACILITATOR: Jennifer aLdd OTR; Amy Hung OT  TOPIC:  Life Skills Group: Life Skills  [facility] Day Treatment  TRACK: DT1    NUMBER OF PARTICIPANTS: 6    Summary of Group / Topics Discussed:  Behavior Activation - Life Skills and Coping Skills Practice: Provided opportunity for patients to independently choose and engage in a therapeutic activity that supports progress towards their goals and psychiatric recovery. Provides a context for patients to monitor their symptoms, gain self-awareness, practice skills (self-regulation, mindfulness, self-talk, focus/concentration, social, productivity), build a sense of self-efficacy and mastery, as well as receive validation, support, and resources.  Discussions included reviewing the benefits of making behavioral changes, and the barriers to doing so.  Specific skills introduced: taking small steps, increasing motivation, decreasing procrastination and withdrawal.     Patient Session Goals / Objectives:   Independently identify a purposeful and meaningful therapeutic activity.   Identified which goal(s) they are intentionally working on during session.    Reflected on their performance and share insight about their progress.   Practiced and identified a way to generalize a skill to their everyday life    Patient Participation / Response:  Fully participated with the group.  Demonstrated good initiative, independent work skills, and good follow through with preferred task.  Affect was appropriate to situation.  Demonstrated understanding of content through structured skill practice and verbal participation in group discussion.     Treatment Plan:  Patient has an initial individualized treatment plan that was created as part of their  diagnostic assessment / admission process.  A master individualized treatment plan is in the process of being developed with the patient and multi-disciplinary care team.    Jennifer Ladd, OTR

## 2023-10-23 ENCOUNTER — HOSPITAL ENCOUNTER (OUTPATIENT)
Dept: BEHAVIORAL HEALTH | Facility: CLINIC | Age: 23
Discharge: HOME | End: 2023-10-23
Attending: PSYCHIATRY & NEUROLOGY
Payer: COMMERCIAL

## 2023-10-23 PROCEDURE — H2012 BEHAV HLTH DAY TREAT, PER HR: HCPCS

## 2023-10-23 PROCEDURE — H0035 MH PARTIAL HOSP TX UNDER 24H: HCPCS

## 2023-10-23 NOTE — GROUP NOTE
Psychoeducation Group Note    PATIENT'S NAME: Julita Fernandez  MRN:   [id number]  :   2000  ACCT. NUMBER: [id number]  DATE OF SERVICE: 10/23/23  START TIME:  9:00 AM  END TIME:  9:50 AM  FACILITATOR: Elaine Taylor RN  TOPIC:  Wellness Group: Medication Education and Management  [facility] Day Treatment  TRACK: 5A    NUMBER OF PARTICIPANTS: 6    Summary of Group / Topics Discussed:  Medication Educations and Management:  Medication Assessment/Review: Patients were given a medication quiz to assess their current knowledge about the medications that are prescribed and why they are taking them. Medication lists were reviewed with each patient individually to provide education and answer questions. Safe medication storage, handling, management, and disposal were discussed within the group.     Patient Session Goals / Objectives:  Assessed patient understanding of their current medications and indication  Identify what to do if a dose is missed  Assessed medication adherence  Assessed knowledge of medication side effects and how to treat them      Patient Participation / Response:  Moderately participated, sharing some personal reflections / insights and adequately adequately received / provided feedback with other participants.     Verbalized understanding of medication education and management topic    Treatment Plan:  Patient has a current master individualized treatment plan.  See Epic treatment plan for more information.    Elaine Taylor RN

## 2023-10-23 NOTE — GROUP NOTE
Process Group Note    PATIENT'S NAME: Julita Fernandez  MRN:   [id number]  :   2000  ACCT. NUMBER: [id number]  DATE OF SERVICE: 10/23/23  START TIME: 10:00 AM  END TIME: 11:00 AM  FACILITATOR: Alem Bautista, LGSW; Corinne Randall, [facility] Hospital[facility]  TOPIC:  Process Group    Diagnoses:  296.32 (F33.1) Major Depressive Disorder, Recurrent Episode, Moderate _ and With melancholic features  300.02 (F41.1) Generalized Anxiety Disorder.  4. Other Diagnoses that is relevant to services:   300.01 (F41.0) Panic Disorder  309.81 (F43.10) Posttraumatic Stress Disorder (includes Posttraumatic Stress Disorder for Children 6 Years and Younger)  With dissociative symptoms.  5. Provisional Diagnosis:  Substance-Related & Addictive Disorders Alcohol Use Disorder   305.00 (F10.10) Mild In a controlled environment  305.20 (F12.10) Cannabis Use Disorder Mild  In a controlled environment.       [facility]  TRACK:     NUMBER OF PARTICIPANTS: 6        Data:    Session content: At the start of this group, patients were invited to check in by identifying themselves, describing their current emotional status, and identifying issues to address in this group.   Area(s) of treatment focus addressed in this session included Symptom Management, Personal Safety, and Community Resources/Discharge Planning.    Client reports feeling pretty depressed. Client reports working towards goal of trying to be more positive.   Client reports using the skill of trying to stay busy to work towards goal. Client reports her anxiety and depression as barriers to meeting goal.     Client stated they are grateful for being at group today and that she is excited about an upcoming trip to [place].     Client discussed in group recent events this past weekend that made her feel like she doesn't have many close friends that she can trust. Client stated she lost many friends after covid and that it is hard to socialize while in  [employer]. Client also reflected on how her boyfriend and some friends are trustworthy. Client also reflected upon how her heightened symptoms make it harder for her cope when interpersonal stress occurs.     Client convincingly denied any active or passive suicidal ideation, plan or intent. Client reported sammie self-injurious behavior thoughts but no intent to act upon these thoughts.            Therapeutic Interventions/Treatment Strategies:  Psychotherapist offered support, feedback and validation and reinforced use of skills. Treatment modalities used include Cognitive Behavioral Therapy and Dialectical Behavioral Therapy. Interventions include Coping Skills: Reviewed patients current calming practices and discussed a more formal way of practicing and accessing skills.    Assessment:    Patient response:   Patient responded to session by accepting feedback, giving feedback, and focusing on goals    Possible barriers to participation / learning include: and no barriers identified    Health Issues:   None reported       Substance Use Review:   Substance Use: No active concerns identified.    Mental Status/Behavioral Observations  Appearance:   Appropriate   Eye Contact:   Good   Psychomotor Behavior: Agitated   Attitude:   Cooperative  Interested  Orientation:   All  Speech   Rate / Production: Normal    Volume:  Normal   Mood:    Anxious  Depressed  Sad   Affect:    Subdued  Tearful  Thought Content:   Clear  Thought Form:  Coherent  Logical     Insight:    Good     Plan:   Safety Plan: No current safety concerns identified.  Recommended that patient call 911 or go to the local ED should there be a change in any of these risk factors.   Barriers to treatment: None identified  Patient Contracts (see media tab):  None  Substance Use: Not addressed in session   Continue or Discharge: Patient will continue in Adult Day Treatment (ADT)  as planned. Patient is likely to benefit from learning and using skills as  they work toward the goals identified in their treatment plan.      Alem Bautista, [facility]  October 23, 2023

## 2023-10-23 NOTE — GROUP NOTE
Psychotherapy Group Note    PATIENT'S NAME: Julita Fernandez  MRN:   [id number]  :   2000  ACCT. NUMBER: [id number]  DATE OF SERVICE: 10/23/23  START TIME: 11:00 AM  END TIME: 12:00 PM  FACILITATOR: Alem Bautista LGSW; Corinne Randall [facility] [place]  TOPIC: MH EBP Group: Behavioral Activation  [facility] Day Treatment  TRACK:     NUMBER OF PARTICIPANTS: 5    Summary of Group / Topics Discussed:  Behavioral Activation: Motivation and Procrastination: Patients explored follow through and what things act as barriers to following though on tasks. They also explored thoughts, feelings, and skills that are motivating and serve to increase desired behaviors.  They also examined behaviors that contribute to procrastination.  Different types of procrastination behaviors were identified, and strategies to reduce individual procrastination and increase motivation were explored. Patients identified ways to increase goal-directed activities.      Patient Session Goals / Objectives:  Identify current patterns of procrastination behavior and how they influence thoughts and moods, and inhibit motivation.  Identify behaviors and skills that can be implemented that contribute to improving motivation.  Identify and develop a plan to increase use of skills that promote a sense of accomplishment and competence.      Patient Participation / Response:  Fully participated with the group by sharing personal reflections / insights and openly received / provided feedback with other participants.    Demonstrated understanding of topics discussed through group discussion and participation, Shared experiences and challenges with making behavioral changes, and Identified barriers to change    Treatment Plan:  Patient has an initial individualized treatment plan that was created as part of their diagnostic assessment / admission process.  A master individualized treatment plan is in the process of being developed  with the patient and multi-disciplinary care team.    Alem Bautista LGSW

## 2023-10-24 ENCOUNTER — HOSPITAL ENCOUNTER (OUTPATIENT)
Dept: BEHAVIORAL HEALTH | Facility: CLINIC | Age: 23
Discharge: HOME | End: 2023-10-24
Attending: PSYCHIATRY & NEUROLOGY
Payer: COMMERCIAL

## 2023-10-24 PROCEDURE — H2012 BEHAV HLTH DAY TREAT, PER HR: HCPCS

## 2023-10-24 NOTE — GROUP NOTE
Psychoeducation Group Note    PATIENT'S NAME: Julita Fernandez  MRN:   [id number]  :   2000  ACCT. NUMBER: [id number]  DATE OF SERVICE: 10/24/23  START TIME: 10:00 AM  END TIME: 10:50 AM  FACILITATOR: Oriana Sebastian RN  TOPIC: MH Wellness Group: Mental Health Maintenance  [facility] Adult Mental Health Day Treatment  TRACK: DT1    NUMBER OF PARTICIPANTS: 7    Summary of Group / Topics Discussed:  Mental Health Maintenance:  Social/Coping Bingo: Patients were educated on the importance of balance in meeting our wellness needs. Topic of social/coping was reviewed and patients participated in playing a verbal response style coping/social BINGO game. In this game, patients were challenged to utilize their understanding of themselves and their coping strategies to respond to the questions on their BINGO cards.    Patient Session Goals / Objectives:  Identified the importance of balance in wellness  Explained the important aspects of socialization/effective coping strategies  Listed ways of improving weak areas in social/coping skills      Patient Participation / Response:  Fully participated with the group by sharing personal reflections / insights and openly received / provided feedback with other participants.    Verbalized understanding of mental health maintenance topic    Treatment Plan:  Patient has a current master individualized treatment plan.  See Epic treatment plan for more information.    Oriana Sebastian RN

## 2023-10-24 NOTE — GROUP NOTE
Psychotherapy Group Note    PATIENT'S NAME: Julita Fernandez  MRN:   [id number]  :   2000  ACCT. NUMBER: [id number]  DATE OF SERVICE: 10/24/23  START TIME: 11:00 AM  END TIME: 11:50 AM  FACILITATOR: Alem Bautista LGSW; [facility]  TOPIC: MH EBP Group: Coping Skills  [facility] Adult Mental Health Day Treatment  TRACK:     NUMBER OF PARTICIPANTS: 7    Summary of Group / Topics Discussed:  Coping Skills: Self-Soothe: Patients learned to apply self-soothe as a way to decrease heightened stress in the moment.  Patients identified situations that necessitate self-soothe strategies.  They focused on ways to manage physical symptoms of distress using the senses. They discussed how to distinguish when this can be useful in their lives when other strategies are more relevant or helpful.    Patient Session Goals / Objectives:  Understand the purpose of using the senses to decrease distress  Process what happens in the body when using self-soothe strategies  Demonstrate understanding of when to use self-soothe strategies  Identify and problem solve barriers to applying self-soothe strategies.  Choose 1-2 self-soothe strategies to apply during times of distress.      Patient Participation / Response:  Fully participated with the group by sharing personal reflections / insights and openly received / provided feedback with other participants.    Demonstrated understanding of topics discussed through group discussion and participation and Demonstrated knowledge of when to consider using a variety of coping skills in daily life    Treatment Plan:  Patient has an initial individualized treatment plan that was created as part of their diagnostic assessment / admission process.  A master individualized treatment plan is in the process of being developed with the patient and multi-disciplinary care team.    BARB Bowers

## 2023-10-24 NOTE — GROUP NOTE
Process Group Note    PATIENT'S NAME: Julita Fernandez  MRN:   [id number]  :   2000  ACCT. NUMBER: [id number]  DATE OF SERVICE: 10/24/23  START TIME:  9:00 AM  END TIME:  9:50 AM  FACILITATOR: Alem Bautista, LGSW; Corinne Randall, [facility]  TOPIC:  Process Group    Diagnosis: 296.32 (F33.1) Major Depressive Disorder, Recurrent Episode, Moderate _ and With melancholic features  300.02 (F41.1) Generalized Anxiety Disorder.  4. Other Diagnoses that is relevant to services:   300.01 (F41.0) Panic Disorder  309.81 (F43.10) Posttraumatic Stress Disorder (includes Posttraumatic Stress Disorder for Children 6 Years and Younger)  With dissociative symptoms.  5. Provisional Diagnosis:  Substance-Related & Addictive Disorders Alcohol Use Disorder   305.00 (F10.10) Mild In a controlled environment  305.20 (F12.10) Cannabis Use Disorder Mild  In a controlled environment.         [facility] Day Treatment  TRACK: 5A/DT 1    NUMBER OF PARTICIPANTS: 7        Data:    Session content: At the start of this group, patients were invited to check in by identifying themselves, describing their current emotional status, and identifying issues to address in this group.   Area(s) of treatment focus addressed in this session included Symptom Management, Personal Safety, and Community Resources/Discharge Planning.    Client reports feeling depressed, anxious, and lonely.     Client reports working towards goal of making it through the day. Client stated she is using the skill of always having something in the day to look forward to reach her goal. Client reported being excited about upcoming trip. Client also stated she is using the skill of self-care. Client reports that a potential barrier to her goals is the emotional impact from events that happened with friends over the weekend.     Client convincingly denied any active or passive suicidal ideation, plan or intent. Client reported some self-injurious  behavior thoughts but no intent to act on these urges.        Therapeutic Interventions/Treatment Strategies:  Psychotherapist offered support, feedback and validation and reinforced use of skills. Treatment modalities used include Cognitive Behavioral Therapy and Dialectical Behavioral Therapy. Interventions include Emotions Management:  Increased awareness of daily mood patterns/changes.    Assessment:    Patient response:   Patient responded to session by accepting feedback, listening, and being attentive    Possible barriers to participation / learning include: and no barriers identified    Health Issues:   None reported       Substance Use Review:   Substance Use: No active concerns identified.    Mental Status/Behavioral Observations  Appearance:   Appropriate   Eye Contact:   Good   Psychomotor Behavior: Normal   Attitude:   Guarded  and subdued   Orientation:   All  Speech   Rate / Production: Normal    Volume:  Normal   Mood:    Anxious  Sad   Affect:    Subdued   Thought Content:   Clear  Thought Form:  Coherent  Logical     Insight:    Good     Plan:   Safety Plan: No current safety concerns identified.  Recommended that patient call 911 or go to the local ED should there be a change in any of these risk factors.   Barriers to treatment: None identified  Patient Contracts (see media tab):  None  Substance Use: Not addressed in session   Continue or Discharge: Patient will continue in Adult Day Treatment (ADT)  as planned. Patient is likely to benefit from learning and using skills as they work toward the goals identified in their treatment plan.      Alem Bautista, ELINOR  October 24, 2023

## 2023-10-25 NOTE — PROGRESS NOTES
"Individualized Treatment Plan       PATIENT'S NAME: Julita Fernandez   MRN:   [id number]  :   2000    LEVEL OF CARE/ PROGRAM PARTICIPATION:     Program Participation: Adult  Outpatient Programs: Track: DT  Frequency: 3 days a week / 3 hours per day    Program Admission Date: 10/10/2023     Program Anticipated Discharge Date: 1/3/2023   Patient is discharging early (2023) to seek treatment for alcohol use at [facility].     Date of Treatment Plan: 10/17/2023    Primary Diagnosis:  296.32 (F33.1) Major Depressive Disorder, Recurrent Episode, Moderate _ and With melancholic features  300.02 (F41.1) Generalized Anxiety Disorder.  4. Other Diagnoses that is relevant to services:   300.01 (F41.0) Panic Disorder  309.81 (F43.10) Posttraumatic Stress Disorder (includes Posttraumatic Stress Disorder for Children 6 Years and Younger)  With dissociative symptoms.  5. Provisional Diagnosis:  Substance-Related & Addictive Disorders Alcohol Use Disorder   305.00 (F10.10) Mild In a controlled environment  305.20 (F12.10) Cannabis Use Disorder Mild  In a controlled environment.       Multidisciplinary Team Members:  Heber Fuller MD; Nessa Curry [facility]; Kristina Upton [facility]     Chief Complaint: The reason for seeking services at this time is: Client stated, she entered the program for  \"treatment of anxiety, depression, possible PTSD.\"        Long Term Goal: Patients long-term stated goal for treatment: Reduction in symptoms.     Patient Participation in Plan:   Patient did contribute to goals and plan. Patient does  agree with plan. Patient did receive a copy of treatment plan. The treatment team will not have contact with patient's family or other supports regarding treatment planning because the patient  does not want to include family or other supports in treatment planning at this time. \"Emergency contact only\"   NOTE: Patient will need to sign Informed Consent / Release of Information " prior to any contact.     Patient Strengths:   caring, good listener, and motivated    Patient Limitations:  Suicidal Ideation   Anxiety  Depressive symptoms   Trauma/Abuse/Neglect      Abuse Prevention Plan:   Treatment team will provide education regarding skill development to address symptom management, life skills, wellness, discharge planning, and personal safety.  Collaborate with patients internal and external providers to coordinate care.  Treatment will be provided in a safe, therapeutic environment.  Care reviewed by medical director      Discharge Criteria:  Patient will discharge from program when it is determined that factors leading to admission have been addressed to the extent that the patient can be safely transitioned to a less intensive level of care. See discharge goals/plan below.     Treatment Focus:    Problem Description: Intermittent suicidal ideation, past self-injurious behavior  Area of Treatment Focus: Personal Safety  Patient Identified Goal:   Measurable Goal: Patient will learn and apply coping strategies to manage suicidal or self-injurious behavior urges. Patient will use and/or develop their individualized copy plan for safety and notify staff of all safety concerns.  Goal Status: Stopped   Intervention Strategies: Staff will provide therapeutic assessment and safety planning as needed.  Goal Start Date: 10/17/23 Goal Target Date: 11/7/23  Review Date: 12/6/23 New Target Date: discharge  Progress Update: continues to have passive suicidal ideation and sib urges at times, but has removed anything that may be used to hurt herself. Julita agrees to use safety plan, if needed. Julita feels more hopeful overall. Goal continues      Discharge Date: 12/18/2023  Progress Update: Patient is discharging early to seek treatment for alcohol use at [facility]. Her safety status per her last check-in is the same as the above progress update.          Problem Description: High Levels  of Depression, Anxiety, and PTSD  Area of Treatment Focus: Symptom Management  Measurable Goal: Patient will learn and apply skills to manage symptoms of anxiety, depression, and PTSD symptoms. Patient will practice skills to improve self-esteem and challenge negative self judgement.   Goal Status: Stopped   Intervention Strategies: Staff will assist in identifying and applying coping skills and assist in identifying and problem solving barriers.  Goal Start Date: 10/17/23 Goal Target Date: 11/7/23  Review Date: 12/6 New Target Date: discharge  Progress Update: Starting to identify negative self-judgement and would like to continue to focus on this. Would like to continue using radical acceptance. Would like to continue to learn more coping skills. Goal continues    Discharge Date: 12/18/2023  Progress Update: Patient is discharging early to seek treatment for alcohol use at [facility].  It is recommended she continue this treatment goal at [facility] and in individual therapy.        Problem Description: Isolation and loneliness, resume college   Area of Treatment Focus: Community Resources/ /Discharge Plan  Measurable Goal: Patient will identify and explore how to effectively build and utilize their support relationships and resources  which include Diane at [facility]. Julita will contact [facility] to schedule psychiatry appointment. She will start school at [employer] next semester.   Patient will create a plan to return to school .  Goal Status: Stopped   Intervention Strategies: Staff will assist in identifying and applying coping skills and assist in identifying and problem solving barriers.  Goal Start Date: 10/17/23 Goal Target Date: 11/7/23  Review Date: 12/6 New Target Date: discharge  Progress Update: plan to start school in place. Continue with therapist.    Discharge Date: 12/18/2023  Progress Update: Patient is discharging early to seek treatment for alcohol use at [facility].  " She has plans to continue in individual therapy and start treatment for alcohol use.        Assessments Completed:  The following assessments were completed by patient for this visit:  PHQ9:       12/9/2021    10:18 AM 7/20/2022     5:51 PM 12/20/2022     4:06 PM 6/13/2023     2:29 PM 9/27/2023     1:25 PM   PHQ-9 SCORE   PHQ-9 Total Score MyChart 20 (Severe depression) 6 (Mild depression) 5 (Mild depression)  18 (Moderately severe depression)   PHQ-9 Total Score  6 5 11 18       Review Date: Does Julita Fernandez continue to meet criteria to participate in the program?   10/26/23 staffing Yes - Heber Fuller MD on 10/26/2023 at 8:50 AM   11/7/23 treatment plan review Yes Nessa Curry   12/06/23 staffing Yes - Heber Fuller MD on 12/6/2023 at 8:31 AM   12/18/2023 discharging early No BARB Stone on 12/18/2023 at 11:21 AM                 Acknowledgement of Current Treatment Plan       I have reviewed my treatment plan with my treatment team member (s) on 10/17/23.   I agree with the plan as it is written in the electronic health record.     Name:                   Signature:                                                          Date:  Julita Fernandez       Heber Fuller MD  Psychiatrist/Medical Director         NOTE: Patient signatures are completed manually and scanned into the electronic medical record. See \"Media\" tab in epic.       I have reviewed the patient's Individualized Treatment Plan and agree with the current goals, interventions and level of care.     Heber Fuller MD  10/26/2023, 12/6/2023   "

## 2023-10-26 ENCOUNTER — OFFICE VISIT (OUTPATIENT)
Dept: FAMILY MEDICINE | Facility: CLINIC | Age: 23
End: 2023-10-26
Payer: COMMERCIAL

## 2023-10-26 ENCOUNTER — HOSPITAL ENCOUNTER (OUTPATIENT)
Dept: BEHAVIORAL HEALTH | Facility: CLINIC | Age: 23
Discharge: HOME | End: 2023-10-26
Attending: PSYCHIATRY & NEUROLOGY
Payer: COMMERCIAL

## 2023-10-26 VITALS
SYSTOLIC BLOOD PRESSURE: 121 MMHG | WEIGHT: 110 LBS | DIASTOLIC BLOOD PRESSURE: 78 MMHG | TEMPERATURE: 98.3 F | RESPIRATION RATE: 16 BRPM | BODY MASS INDEX: 18.88 KG/M2 | OXYGEN SATURATION: 96 % | HEART RATE: 96 BPM

## 2023-10-26 DIAGNOSIS — Z72.89 DELIBERATE SELF-CUTTING: Primary | ICD-10-CM

## 2023-10-26 DIAGNOSIS — S81.812A LACERATION OF LEG, LEFT, INITIAL ENCOUNTER: ICD-10-CM

## 2023-10-26 PROCEDURE — 99213 OFFICE O/P EST LOW 20 MIN: CPT | Performed by: FAMILY MEDICINE

## 2023-10-26 PROCEDURE — H2012 BEHAV HLTH DAY TREAT, PER HR: HCPCS | Performed by: OCCUPATIONAL THERAPIST

## 2023-10-26 PROCEDURE — H2012 BEHAV HLTH DAY TREAT, PER HR: HCPCS

## 2023-10-26 NOTE — PROGRESS NOTES
Assessment & Plan     Deliberate self-cutting  Laceration of leg, left, initial encounter     Cleaned the area with chlorhexidine/sterile water solution, no active bleeding which is expected 15 hours after the self -inflicted injury. I do not feel this would benefit this late from skin glue or stitches. I advised thin layers of bacitracin and covering with non-stick bandage secured by coban. Additional supplies were given     Theron Welsh MD   [place] [facility]    Subjective     Julita is a 22 year old female who presents to clinic today for the following health issues:  Chief Complaint   Patient presents with    Leg Injury     Lt leg x yesterday.     HPI      She did 3 hours of outpatient TTP therapy today and feeling much better. No homicidal or suicidal thoughts.     The self-inflicted injury using a razorblade occurred this morning at 1230 am    She is here today with her boyfriend  Patient Active Problem List    Diagnosis Date Noted    Anxiety disorder, unspecified type 09/19/2023     Priority: High    Panic attack 09/19/2023     Priority: High    MDD (major depressive disorder), recurrent episode, moderate (H) 10/10/2023     Priority: Medium    Alcohol abuse, episodic drinking behavior 09/19/2023     Priority: Medium    Alcohol abuse 09/19/2023     Priority: Medium    Anxiety 09/19/2023     Priority: Medium    Marijuana abuse 09/19/2023     Priority: Medium    Marijuana use 08/31/2023     Priority: Medium    Benzodiazepine withdrawal without complication (H) 08/31/2023     Priority: Medium     possibly benzo withdraw resulting in ER visit.      H/O alcohol abuse 02/13/2022     Priority: Medium    UGIB (upper gastrointestinal bleed) 02/13/2022     Priority: Medium    IUD (intrauterine device) in place 10/01/2019     Priority: Medium     Mirena IUD placed 10/1/2019.      Gastroesophageal reflux disease, esophagitis presence not specified 09/30/2019     Priority: Medium    Encounter for other general  counseling or advice on contraception 2019     Priority: Medium    Generalized anxiety disorder 2019     Priority: Medium       Current Outpatient Medications   Medication    gabapentin (NEURONTIN) 300 MG capsule    pantoprazole (PROTONIX) 40 MG EC tablet    sertraline (ZOLOFT) 50 MG tablet    levonorgestrel (MIRENA) 20 mcg/24 hours (5 yrs) 52 mg IUD     No current facility-administered medications for this visit.           Objective    /78   Pulse 96   Temp 98.3  F (36.8  C) (Oral)   Resp 16   Wt 49.9 kg (110 lb)   LMP  (LMP Unknown)   SpO2 96%   BMI 18.88 kg/m    Physical Exam       L lower le parallel superficial lacerations of anterior leg, the most superior laceration has a small hematoma within the laceration on the medial 3thhird. The wounds do not expand with leg movement/stress/dorsiflexion  Beneath the 4 recently injured areas are horizontal scars which are non-elevated      No results found for any visits on 10/26/23.                  The use of Dragon/Moncai dictation services may have been used to construct the content in this note; any grammatical or spelling errors are non-intentional. Please contact the author of this note directly if you are in need of any clarification.

## 2023-10-26 NOTE — GROUP NOTE
Psychotherapy Group Note    PATIENT'S NAME: Julita Fernandez  MRN:   [id number]  :   2000  ACCT. NUMBER: [id number]  DATE OF SERVICE: 10/26/23  START TIME: 11:00 AM  END TIME: 11:50 AM  FACILITATOR: Alem Bautista LGSW; Corinne Randall LICSW  TOPIC: MH EBP Group: Specialty Awareness  [facility] Mental Health Day Treatment  TRACK: DT 1    NUMBER OF PARTICIPANTS: 7    Summary of Group / Topics Discussed:  Specialty Topics?-?Group Readiness/Rules/Expectations ?The purpose of this specialty topic is to help patients identify the?rules and expectations of the intensive outpatient program.?The focus will be on helping patients?to better understand how?to share and process emotions, discuss sensitive topics, regulate emotions during group sessions, and manage attendance expectations.???      Name and Description:??     This topic will?help patients?to better understand?the rules and expectations of the intensive outpatient program.????     ?  Objective(s):?     Discuss program rules and expectations    Discuss group-appropriate behaviors versus therapy-interfering behaviors ?     Identify strategies to share and regulate emotions during programming       Patient Participation / Response:  Fully participated with the group by sharing personal reflections / insights and openly received / provided feedback with other participants.    Demonstrated understanding of topics discussed through group discussion and participation and Identified / Expressed readiness to act on skill suggestions discussed in topic    Treatment Plan:  Patient has a current master individualized treatment plan.  See Epic treatment plan for more information.    BARB Bowers

## 2023-10-26 NOTE — GROUP NOTE
Process Group Note    PATIENT'S NAME: Julita Fernandez  MRN:   [id number]  :   2000  ACCT. NUMBER: [id number]  DATE OF SERVICE: 10/26/23  START TIME: 10:00 AM  END TIME: 10:50 AM  FACILITATOR: Alem Bautista LGSW; Corinne Randall, [facility]  TOPIC:  Process Group    Diagnoses:  296.32 (F33.1) Major Depressive Disorder, Recurrent Episode, Moderate _ and With melancholic features  300.02 (F41.1) Generalized Anxiety Disorder.  4. Other Diagnoses that is relevant to services:   300.01 (F41.0) Panic Disorder  309.81 (F43.10) Posttraumatic Stress Disorder (includes Posttraumatic Stress Disorder for Children 6 Years and Younger)  With dissociative symptoms.  5. Provisional Diagnosis:  Substance-Related & Addictive Disorders Alcohol Use Disorder   305.00 (F10.10) Mild In a controlled environment  305.20 (F12.10) Cannabis Use Disorder Mild  In a controlled environment.       [facility] Day Treatment  TRACK: DT     NUMBER OF PARTICIPANTS: 7        Data:    Session content: At the start of this group, patients were invited to check in by identifying themselves, describing their current emotional status, and identifying issues to address in this group.   Area(s) of treatment focus addressed in this session included Symptom Management, Personal Safety, and Community Resources/Discharge Planning.    Client reports feeling overwhelmed and like she has brain fog.        Client reports working towards goal of living in the moment.        Client reports using the skills of distraction and having a list of things to do to work towards goal.        Client reports ruminating thoughts and difficulty focusing as a barrier to meeting goal.    Client stated they are grateful for the upcoming trip to [place] next week.    Client convincingly denied any active or passive suicidal ideation, plan or intent. Client stated she engaged in self-injurious behavior. Corinne Randall, [facility], arranged for client to meet  with program RN upon client's request on 10/26/23.       Therapeutic Interventions/Treatment Strategies:  Psychotherapist offered support, feedback and validation and reinforced use of skills. Treatment modalities used include Cognitive Behavioral Therapy and Dialectical Behavioral Therapy. Interventions include Symptoms Management: Promoted understanding of their diagnoses and how it impacts their functioning.    Assessment:    Patient response:   Patient responded to session by listening and being attentive    Possible barriers to participation / learning include: and no barriers identified    Health Issues:   None reported       Substance Use Review:   Substance Use: No active concerns identified.    Mental Status/Behavioral Observations  Appearance:   Appropriate   Eye Contact:   Fair   Psychomotor Behavior: Normal   Attitude:   Cooperative  and withdrawn   Orientation:   All  Speech   Rate / Production: Normal    Volume:  Soft   Mood:    Anxious  Depressed  Sad   Affect:    Appropriate   Thought Content:   Clear  Thought Form:  Coherent  Logical     Insight:    Good     Plan:   Safety Plan: No current safety concerns identified.  Recommended that patient call 911 or go to the local ED should there be a change in any of these risk factors.   Barriers to treatment: None identified  Patient Contracts (see media tab):  None  Substance Use: Not addressed in session   Continue or Discharge: Patient will continue in Adult Day Treatment (ADT)  as planned. Patient is likely to benefit from learning and using skills as they work toward the goals identified in their treatment plan.      Alem Bautista, BARB  October 26, 2023

## 2023-10-26 NOTE — GROUP NOTE
Psychoeducation Group Note    PATIENT'S NAME: Julita Fernandez  MRN:   [id number]  :   2000  ACCT. NUMBER: [id number]  DATE OF SERVICE: 10/26/23  START TIME:  9:00 AM  END TIME:  9:50 AM  FACILITATOR: Jennifer Ladd OTR  TOPIC:  Life Skills Group: Life Skills  [facility] Adult Mental Health Day Treatment  TRACK: DT1    NUMBER OF PARTICIPANTS: 6    Summary of Group / Topics Discussed:  Behavior Activation - Life Skills and Coping Skills Practice Provided opportunity for patients to independently choose and engage in a therapeutic activity that supports progress towards their goals and psychiatric recovery. Provides a context for patients to monitor their symptoms, gain self-awareness, practice skills (self-regulation, mindfulness, self-talk, focus/concentration, social, productivity), build a sense of self-efficacy and mastery, as well as receive validation, support, and resources.  Discussions included reviewing the benefits of making behavioral changes, and the barriers to doing so.  Specific skills introduced: taking small steps, increasing motivation, decreasing procrastination and withdrawal.     Patient Session Goals / Objectives:   Independently identify a purposeful and meaningful therapeutic activity.   Identified which goal(s) they are intentionally working on during session.    Reflected on their performance and share insight about their progress.   Practiced and identified a way to generalize a skill to their everyday life      Patient Participation / Response:  Fully participated with the group by sharing personal reflections / insights.  Slight range in affect.  Demonstrated understanding of content through structured skill practice and verbal participation in group discussion. Displayed creative work skills and strong attention to detail.     Treatment Plan:  Patient has a current master individualized treatment plan.  See Epic treatment plan for more information.    Jennifer Ladd  OTR

## 2023-10-26 NOTE — PROGRESS NOTES
Patient requested to meet with nursing regarding self-injury and other question(s). RN met with patient - patient reported new superficial laceration on ankle from last night - denied SI, acted on SIB urges last evening using a razor. Patient denies current concerns for safety or SI and reports feeling safe and contracted for safety identifying their partner as external support person. RN observed the wound - no early indication of infection but  due to location of wound and appearance that the wound re-opened at laceration site, patient was advised to see by urgent care for medical treatment. Patient also provided with education about wound care and infection S&S and prevention in case she choses not to receive medical care at urgent care. Patient asked about drinking this weekend while on her trip to [place] with the medications that she is currently on - RN advised that patient abstain from drinking while taking antidepressants and gabapentin and provided some education about how alcohol can interact with her medications and some safety/risk reduction strategies if she does chose to drink.     Elaine Taylor RN on 10/26/2023 at 1:28 PM

## 2023-10-27 ENCOUNTER — TELEPHONE (OUTPATIENT)
Dept: BEHAVIORAL HEALTH | Facility: CLINIC | Age: 23
End: 2023-10-27
Payer: COMMERCIAL

## 2023-10-27 NOTE — TELEPHONE ENCOUNTER
Writer and patient discussed patient's insurance and requirement to attend an IOP track. Patient will be in [place] through 11/1 and will start in IOP 1 on 11/2.    Alysa Coughlin [facility] on 10/27/2023 at 1:39 PM

## 2023-10-27 NOTE — TELEPHONE ENCOUNTER
----- Message from Alysa Coughlin [facility] sent at 10/27/2023  1:40 PM CDT -----  Regarding: Patient switching from DT 1 to IOP 1 on 11/2  Adult Mental Health Programmatic Care Schedule Request    Patient Name: Julita Fernandez  Location of programming: [facility]  Start Date: 11/2/23      Adult Program Group: Adult Program Group: IOP 1 General Mood Track  [[id number]]  Schedule:  M, T, W, Th 9am- 12noon  12 hours per week for 9 weeks  Number of visits to be scheduled: 36 days    #Patient was notified that insurance requires IOP track, please cancel future DT 1 appointments.#      Attending Provider (MD):  Dr Heber Fuller.  Visit Type:  In-Person    Accommodations Needed: N/A  Alerts Identified/Substantiation: N/A  Consulted with Supervisor: N/A    Send to:   [[facility] ([id number])] ##ONLY if Start Date is determined##  NG 14 OBC's (BEH BEHAVIORAL OUTPATIENT ASSESSMENTS [[id number]])   Alysa Coughlin ([facility])  Genevieve Lin (PHP)  Alisson Remy

## 2023-11-02 ENCOUNTER — HOSPITAL ENCOUNTER (OUTPATIENT)
Dept: BEHAVIORAL HEALTH | Facility: CLINIC | Age: 23
Discharge: HOME | End: 2023-11-02
Attending: PSYCHIATRY & NEUROLOGY
Payer: COMMERCIAL

## 2023-11-02 ENCOUNTER — OFFICE VISIT (OUTPATIENT)
Dept: FAMILY MEDICINE | Facility: CLINIC | Age: 23
End: 2023-11-02
Payer: COMMERCIAL

## 2023-11-02 VITALS
HEIGHT: 64 IN | HEART RATE: 80 BPM | WEIGHT: 111.5 LBS | DIASTOLIC BLOOD PRESSURE: 72 MMHG | BODY MASS INDEX: 19.04 KG/M2 | SYSTOLIC BLOOD PRESSURE: 100 MMHG

## 2023-11-02 DIAGNOSIS — F41.0 PANIC ATTACK: ICD-10-CM

## 2023-11-02 DIAGNOSIS — F41.1 GENERALIZED ANXIETY DISORDER: Primary | ICD-10-CM

## 2023-11-02 DIAGNOSIS — F33.1 MDD (MAJOR DEPRESSIVE DISORDER), RECURRENT EPISODE, MODERATE (H): Primary | ICD-10-CM

## 2023-11-02 DIAGNOSIS — F41.9 ANXIETY: ICD-10-CM

## 2023-11-02 DIAGNOSIS — F33.1 MDD (MAJOR DEPRESSIVE DISORDER), RECURRENT EPISODE, MODERATE (H): ICD-10-CM

## 2023-11-02 PROCEDURE — 99205 OFFICE O/P NEW HI 60 MIN: CPT

## 2023-11-02 PROCEDURE — 90686 IIV4 VACC NO PRSV 0.5 ML IM: CPT | Performed by: FAMILY MEDICINE

## 2023-11-02 PROCEDURE — 90853 GROUP PSYCHOTHERAPY: CPT

## 2023-11-02 PROCEDURE — 91320 SARSCV2 VAC 30MCG TRS-SUC IM: CPT | Performed by: FAMILY MEDICINE

## 2023-11-02 PROCEDURE — 96127 BRIEF EMOTIONAL/BEHAV ASSMT: CPT | Mod: 59 | Performed by: FAMILY MEDICINE

## 2023-11-02 PROCEDURE — 99214 OFFICE O/P EST MOD 30 MIN: CPT | Mod: 25 | Performed by: FAMILY MEDICINE

## 2023-11-02 PROCEDURE — 90471 IMMUNIZATION ADMIN: CPT | Performed by: FAMILY MEDICINE

## 2023-11-02 PROCEDURE — 90480 ADMN SARSCOV2 VAC 1/ONLY CMP: CPT | Performed by: FAMILY MEDICINE

## 2023-11-02 RX ORDER — SERTRALINE HYDROCHLORIDE 100 MG/1
100 TABLET, FILM COATED ORAL DAILY
Qty: 90 TABLET | Refills: 1 | Status: SHIPPED | OUTPATIENT
Start: 2023-11-02

## 2023-11-02 ASSESSMENT — PATIENT HEALTH QUESTIONNAIRE - PHQ9
SUM OF ALL RESPONSES TO PHQ QUESTIONS 1-9: 15
SUM OF ALL RESPONSES TO PHQ QUESTIONS 1-9: 15
10. IF YOU CHECKED OFF ANY PROBLEMS, HOW DIFFICULT HAVE THESE PROBLEMS MADE IT FOR YOU TO DO YOUR WORK, TAKE CARE OF THINGS AT HOME, OR GET ALONG WITH OTHER PEOPLE: VERY DIFFICULT
SUM OF ALL RESPONSES TO PHQ QUESTIONS 1-9: 15
SUM OF ALL RESPONSES TO PHQ QUESTIONS 1-9: 15
10. IF YOU CHECKED OFF ANY PROBLEMS, HOW DIFFICULT HAVE THESE PROBLEMS MADE IT FOR YOU TO DO YOUR WORK, TAKE CARE OF THINGS AT HOME, OR GET ALONG WITH OTHER PEOPLE: VERY DIFFICULT

## 2023-11-02 ASSESSMENT — ANXIETY QUESTIONNAIRES
IF YOU CHECKED OFF ANY PROBLEMS ON THIS QUESTIONNAIRE, HOW DIFFICULT HAVE THESE PROBLEMS MADE IT FOR YOU TO DO YOUR WORK, TAKE CARE OF THINGS AT HOME, OR GET ALONG WITH OTHER PEOPLE: VERY DIFFICULT
GAD7 TOTAL SCORE: 18
6. BECOMING EASILY ANNOYED OR IRRITABLE: MORE THAN HALF THE DAYS
5. BEING SO RESTLESS THAT IT IS HARD TO SIT STILL: SEVERAL DAYS
1. FEELING NERVOUS, ANXIOUS, OR ON EDGE: NEARLY EVERY DAY
7. FEELING AFRAID AS IF SOMETHING AWFUL MIGHT HAPPEN: NEARLY EVERY DAY
3. WORRYING TOO MUCH ABOUT DIFFERENT THINGS: NEARLY EVERY DAY
2. NOT BEING ABLE TO STOP OR CONTROL WORRYING: NEARLY EVERY DAY
4. TROUBLE RELAXING: NEARLY EVERY DAY
GAD7 TOTAL SCORE: 18

## 2023-11-02 NOTE — GROUP NOTE
Process Group Note    PATIENT'S NAME: Julita Fernandez  MRN:   [id number]  :   2000  ACCT. NUMBER: [id number]  DATE OF SERVICE: 23  START TIME:  9:00 AM  END TIME:  9:50 AM  FACILITATOR: Nessa Curry LICSW  TOPIC:  Process Group    Diagnoses:  296.32 (F33.1) Major Depressive Disorder, Recurrent Episode, Moderate _ and With melancholic features  300.02 (F41.1) Generalized Anxiety Disorder.  4. Other Diagnoses that is relevant to services:   300.01 (F41.0) Panic Disorder  309.81 (F43.10) Posttraumatic Stress Disorder (includes Posttraumatic Stress Disorder for Children 6 Years and Younger)  With dissociative symptoms.  5. Provisional Diagnosis:  Substance-Related & Addictive Disorders Alcohol Use Disorder   305.00 (F10.10) Mild In a controlled environment  305.20 (F12.10) Cannabis Use Disorder Mild  In a controlled environment.    [facility] Day Treatment  TRACK: IOP 1    NUMBER OF PARTICIPANTS: 9        Data:    Session content: At the start of this group, patients were invited to check in by identifying themselves, describing their current emotional status, and identifying issues to address in this group.   Area(s) of treatment focus addressed in this session included Symptom Management and Personal Safety.  Pt is welcomed and oriented to this group, after transferring from another track. Pt reports a struggle with depression, anxiety and ptsd. She is anxious today as she settles in. Pt reports feeling proud of going on vacation with her boyfriend this week. She was able to enjoy sightseeing in [place]. Pt identifies coping skills as grounding and being outside. Denies safety concerns.    Therapeutic Interventions/Treatment Strategies:  Psychotherapist offered support, feedback and validation and reinforced use of skills. Treatment modalities used include Cognitive Behavioral Therapy. Interventions include Coping Skills: Discussed use of self-soothe skills to decrease  "distress in the body, Discussed how the use of intentional \"in the moment\" actions can help reduce distress, Reviewed patients current calming practices and discussed a more formal way of practicing and accessing skills, and Assisted patient in understanding the purpose of planning / creating / participating / sharing in positive experiences.    Assessment:    Patient response:   Patient responded to session by accepting feedback, listening, focusing on goals, being attentive, and accepting support    Possible barriers to participation / learning include: and no barriers identified    Health Issues:   None reported       Substance Use Review:   Substance Use: alcohol  and cannabis .  and Last use: this wk. Claims that she will quit drinking now that she is home    Mental Status/Behavioral Observations  Appearance:   Appropriate   Eye Contact:   Fair   Psychomotor Behavior: Normal   Attitude:   Cooperative   Orientation:   All  Speech   Rate / Production: Normal    Volume:  Soft   Mood:    Anxious  Depressed   Affect:    Worrisome   Thought Content:   Rumination  Thought Form:  Coherent  Obsessive     Insight:    Good     Plan:   Safety Plan: No current safety concerns identified.  Recommended that patient call 911 or go to the local ED should there be a change in any of these risk factors.   Barriers to treatment: None identified  Patient Contracts (see media tab):  None  Substance Use: Not addressed in session   Continue or Discharge: Patient will continue in Adult Day Treatment (ADT)  as planned. Patient is likely to benefit from learning and using skills as they work toward the goals identified in their treatment plan.      Nessa Velázquez, MaineGeneral Medical CenterSW  November 2, 2023  "
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
## 2023-11-02 NOTE — PROGRESS NOTES
Assessment & Plan     Generalized anxiety disorder  Hx panic attacks  PTSD  Well supported; currently in outpatient day program 4d/week with [facility] after EMPATH unit evaluation. Plans to establish with a psychiatrist.  At this point I recommended in agreement with her day program providers to increase sertraline to 100 mg and I did also reemphasized the importance to increase her gabapentin.  She is currently only using 300 mg once daily and I told her to go to 2 doses today (AM/PM)  and tomorrow and then by the third day can take all 3 doses throughout the day.  Contracts for safety today  She has close follow-up with therapy and she will follow-up with me in about 3 months or sooner if desired.  - sertraline (ZOLOFT) 100 MG tablet; Take 1 tablet (100 mg) by mouth daily      Carolyn Latham MD  [facility]   Julita is a 22 year old, presenting for the following health issues:  Recheck Medication (Follow up- was wondering when she needs her IUD replaced, Outpatient treatment doc was to send over a note to PCP to increase her sertraline to 100mg)        11/2/2023    12:14 PM   Additional Questions   Roomed by Darcy PADGETT LPN     At our visit on 9/18 we had recommended she go to the EMPATH unit for mental health and she was discharged after a night for the day treatment program ([facility]) a couple weeks later and given Rx for gabapentin for prn anxiety. She is taking it currently once every morning.  Finding this is a lot better than the ativan.   Going to the day program 4  days a week for 3 hours of therapy.   Also doing family therapy and individual therapy each weekly.   She is planning to establish with the psychiatrist - she hopes to see them this month yet.   She does smoke THC but less from prior    10/26/2023 she was seen by another provider for self cutting area on her left leg-she cut herself approximately 12:30 AM the day of the visit with a razor blade. She  "describes friendship issues and felt she regretted the decision to do that.  No stitches were required.  She had 3 hours of outpatient therapy and was not feeling homicidal or throat suicidal thoughts at the time of the visit.     Her boyfriend is supportive and aware of that recent visit.    WE reviewed her IUD is a Mirena device placed 10/1/2019 and good for 7 years by current guidelines    She decided to stop her school work this semester and put this on hold.           6/13/2023     2:29 PM 9/27/2023     1:25 PM 11/2/2023    12:01 PM   PHQ   PHQ-9 Total Score 11 18 15    15   Q9: Thoughts of better off dead/self-harm past 2 weeks Not at all Not at all Not at all    Not at all         6/13/2023     2:29 PM 9/18/2023     2:31 PM 11/2/2023    12:01 PM   MELANY-7 SCORE   Total Score  20 (severe anxiety) 18 (severe anxiety)   Total Score 13 20 18         History of Present Illness       Mental Health Follow-up:  Patient presents to follow-up on Depression & Anxiety.Patient's depression since last visit has been:  Better  The patient is not having other symptoms associated with depression.  Patient's anxiety since last visit has been:  Better  The patient is not having other symptoms associated with anxiety.  Any significant life events: No  Patient is feeling anxious or having panic attacks.  Patient has no concerns about alcohol or drug use.    She eats 0-1 servings of fruits and vegetables daily.She consumes 0 sweetened beverage(s) daily.She exercises with enough effort to increase her heart rate 10 to 19 minutes per day.  She exercises with enough effort to increase her heart rate 6 days per week.   She is taking medications regularly.                 Review of Systems         Objective    /72 (BP Location: Left arm, Patient Position: Sitting, Cuff Size: Adult Regular)   Pulse 80   Ht 1.626 m (5' 4\")   Wt 50.6 kg (111 lb 8 oz)   LMP  (LMP Unknown)   BMI 19.14 kg/m    Body mass index is 19.14 " kg/m .  Physical Exam   GENERAL: healthy, alert and no distress  EYES: Eyes grossly normal to inspection, PERRL and conjunctivae and sclerae normal  NECK: no adenopathy, no asymmetry, masses, or scars and thyroid normal to palpation  RESP: lungs clear to auscultation - no rales, rhonchi or wheezes  CV: regular rate and rhythm,  ABDOMEN: soft, nontender, no hepatosplenomegaly, no masses and bowel sounds normal  MS: no gross musculoskeletal defects noted, no edema  PSYCH: mentation appears normal, affect normal/bright, anxious, judgement and insight intact, and appearance well groomed  SKIN: the superior 4 parallel lacerations on her left leg are healing well; no erythema or discharge. The top most lac has slight separation healing by secondary intention  There are another 4-5 parallel lacs that are very superficial below this set and well healed.

## 2023-11-02 NOTE — GROUP NOTE
Psychotherapy Group Note    PATIENT'S NAME: Julita Fernandez  MRN:   [id number]  :   2000  ACCT. NUMBER: [id number]  DATE OF SERVICE: 23  START TIME: 10:00 AM  END TIME: 10:50 AM  FACILITATOR: Nessa Curry LICSW  TOPIC:  EBP Group: Emotions Management  [facility] Mental Health Day Treatment  TRACK: IOP 1    NUMBER OF PARTICIPANTS: 9    Summary of Group / Topics Discussed:  Emotions Management: Anger part 2: Patients explored and shared personal experiences associated with feelings of anger.  Group explored how these feelings develop, what they mean to each individual, and how to increase acceptance and usefulness of these feelings.  Discussed anger as a  secondary  emotion and reviewed ways to manage anger and challenge associated cognitive distortions. Group members worked to contextualize these concepts and promote healing.     Patient Session Goals / Objectives:  Discuss and review definitions and personal views/experiences with anger  Explore how feelings of anger impact functioning  Understand and practice strategies to manage difficult emotions and move towards healing  Demonstrate understanding of the feelings of anger  Verbalize how these emotions have impacted their lives/functioning  Verbalize of knowledge gained and possible interventions to manage feelings      Patient Participation / Response:  Minimally participated, only when prompted / asked.    Demonstrated understanding of topics discussed through group discussion and participation    Treatment Plan:  Patient has a current master individualized treatment plan.  See Epic treatment plan for more information.    JEREMY Soto

## 2023-11-07 ENCOUNTER — HOSPITAL ENCOUNTER (OUTPATIENT)
Dept: BEHAVIORAL HEALTH | Facility: CLINIC | Age: 23
Discharge: HOME | End: 2023-11-07
Attending: PSYCHIATRY & NEUROLOGY
Payer: COMMERCIAL

## 2023-11-07 PROCEDURE — 90853 GROUP PSYCHOTHERAPY: CPT

## 2023-11-07 NOTE — GROUP NOTE
Psychoeducation Group Note    PATIENT'S NAME: Julita Fernandez  MRN:   [id number]  :   2000  ACCT. NUMBER: [id number]  DATE OF SERVICE: 23  START TIME: 11:00 AM  END TIME: 11:50 AM  FACILITATOR: Kristina Upton LGSW  TOPIC: MH Life Skills Group: Lifestyle Balance and Structure  [facility] Adult Mental Health Day Treatment  TRACK: IOP 1 GM    NUMBER OF PARTICIPANTS: 5    Summary of Group / Topics Discussed:  Lifestyle Balance and Strucure:  Time Management: Time for Tips and Tips for Time (part 1): Patients were introduced to how effective time management is beneficial to self esteem, relationships with others, life balance, and other aspects of daily life.   Patients were taught strategies on how to improve time management skills.    Patient Session Goals / Objectives:  Facilitated the discussion on challenges/barriers and personal situations with time management   Identified specific techniques and strategies to improve time management to support mental health recovery     Identified a plan to implement strategies into daily life to support improved time management       Patient Participation / Response:  Fully participated with the group by sharing personal reflections / insights and openly received / provided feedback with other participants.    Verbalized understanding of content and Patient would benefit from additional opportunities to practice the content to be able to generalize it to their everyday life with increased intentionality, consistency, and efficacy in support of their psychiatric recovery    Treatment Plan:  Patient has a current master individualized treatment plan.  See Epic treatment plan for more information.    BARB Stone

## 2023-11-07 NOTE — GROUP NOTE
Psychotherapy Group Note    PATIENT'S NAME: Julita Fernandez  MRN:   [id number]  :   2000  ACCT. NUMBER: [id number]  DATE OF SERVICE: 23  START TIME: 10:00 AM  END TIME: 10:50 AM  FACILITATOR: Nessa Curry LICSW  TOPIC: MH EBP Group: Behavioral Activation  [facility] Adult [facility] Health Day Treatment  TRACK: IOP 1    NUMBER OF PARTICIPANTS: 6    Summary of Group / Topics Discussed:  Behavioral Activation: Motivation and Procrastination part 2: Patients explored how they currently spend their time, identifying thoughts and feelings that are motivating and serve to increase desired behaviors.  They also examined behaviors that contribute to procrastination.  Different types of procrastination behaviors were identified, and strategies to reduce individual procrastination and increase motivation were explored and practiced.  Patients identified ways to increase goal-directed activities to enhance mood and reduce symptoms.        Patient Session Goals / Objectives:  Identify current patterns of procrastination behavior and how they influence thoughts and moods, and inhibit motivation.  Identify behaviors that can be implemented that contribute to improving thoughts and feelings, motivation, and reduce symptoms.  Identify and develop a plan to increase activities that promote a sense of accomplishment and competence.  Practice scheduling positive activities / behaviors into daily routines.      Patient Participation / Response:  Fully participated with the group by sharing personal reflections / insights and openly received / provided feedback with other participants.    Demonstrated understanding of topics discussed through group discussion and participation, Expressed understanding of the relationship between behaviors, thoughts, and feelings, and Shared experiences and challenges with making behavioral changes    Treatment Plan:  Patient has a current master individualized treatment plan.  See  Epic treatment plan for more information.    Nessa Velázquez, LICSW

## 2023-11-07 NOTE — GROUP NOTE
"Process Group Note    PATIENT'S NAME: Julita Fernandez  MRN:   [id number]  :   2000  ACCT. NUMBER: [id number]  DATE OF SERVICE: 23  START TIME:  9:00 AM  END TIME:  9:50 AM  FACILITATOR: Nessa Curry LICSW  TOPIC:  Process Group    Diagnoses:  296.32 (F33.1) Major Depressive Disorder, Recurrent Episode, Moderate _ and With melancholic features  300.02 (F41.1) Generalized Anxiety Disorder.  4. Other Diagnoses that is relevant to services:   300.01 (F41.0) Panic Disorder  309.81 (F43.10) Posttraumatic Stress Disorder (includes Posttraumatic Stress Disorder for Children 6 Years and Younger)  With dissociative symptoms.  5. Provisional Diagnosis:  Substance-Related & Addictive Disorders Alcohol Use Disorder   305.00 (F10.10) Mild In a controlled environment  305.20 (F12.10) Cannabis Use Disorder Mild  In a controlled environment.    [facility] Day Treatment  TRACK: IOP 1    NUMBER OF PARTICIPANTS: 5        Data:    Session content: At the start of this group, patients were invited to check in by identifying themselves, describing their current emotional status, and identifying issues to address in this group.   Area(s) of treatment focus addressed in this session included Symptom Management and Personal Safety.  Pt reports feeling \"overwhelmed after being back from vacation\". She reports a messy room and sets a goal to do one load of laundry. Pt is grateful for her boyfriend and her dogs. Denies safety concerns.    Therapeutic Interventions/Treatment Strategies:  Psychotherapist offered support, feedback and validation and reinforced use of skills. Treatment modalities used include Cognitive Behavioral Therapy. Interventions include Behavioral Activation: Explored how behaviors effect mood and interact with thoughts and feelings.    Assessment:    Patient response:   Patient responded to session by accepting feedback, giving feedback, listening, focusing on goals, being " attentive, and accepting support    Possible barriers to participation / learning include: and no barriers identified    Health Issues:   None reported       Substance Use Review:   Substance Use: cannabis .  and Last use: yesterday. Uses daily and sets a goal to quit on her birthday    Mental Status/Behavioral Observations  Appearance:   Appropriate   Eye Contact:   Good   Psychomotor Behavior: Normal   Attitude:   Cooperative   Orientation:   All  Speech   Rate / Production: Normal    Volume:  Normal   Mood:    Anxious   Affect:    Worrisome   Thought Content:   Rumination  Thought Form:  Coherent     Insight:    Good     Plan:   Safety Plan: No current safety concerns identified.  Recommended that patient call 911 or go to the local ED should there be a change in any of these risk factors.   Barriers to treatment: None identified  Patient Contracts (see media tab):  None  Substance Use: Not addressed in session   Continue or Discharge: Patient will continue in Adult Day Treatment (ADT)  as planned. Patient is likely to benefit from learning and using skills as they work toward the goals identified in their treatment plan.      Nessa Velázquez, [facility]  November 7, 2023

## 2023-11-08 ENCOUNTER — HOSPITAL ENCOUNTER (OUTPATIENT)
Dept: BEHAVIORAL HEALTH | Facility: CLINIC | Age: 23
Discharge: HOME | End: 2023-11-08
Attending: PSYCHIATRY & NEUROLOGY
Payer: COMMERCIAL

## 2023-11-08 PROCEDURE — 90853 GROUP PSYCHOTHERAPY: CPT

## 2023-11-08 NOTE — GROUP NOTE
Psychotherapy Group Note     PATIENT'S NAME: Julita Fernandez  MRN:   [id number]  :   2000  ACCT. NUMBER: [id number]  DATE OF SERVICE: 23  START TIME: 10:00 AM  END TIME: 10:50 AM  FACILITATOR: Deyanira Go LICSW  TOPIC:  EBP Group: Behavioral Activation  [facility] Mental [facility] Outpatient Programs  TRACK: [facility]      NUMBER OF PARTICIPANTS: 6    Summary of Group / Topics Discussed:  Behavioral Activation: Activity Scheduling:Patients explored how they currently spend their time, and how specific behaviors impact thoughts and feelings.  The group explored the effect of negative and positive activities on mood states and thought patterns.  Patients identified activities that help to improve mood and thinking patterns, and developed a plan to implement positive activities between sessions.      Patient Session Goals / Objectives:  Identify impact of current behaviors on thoughts and mood  Identify 2-3 behavioral changes that could have a positive impact on thoughts and mood  Prepare to make desired behavioral change: Create a change plan / activity schedule      Patient Participation / Response:  Fully participated with the group by sharing personal reflections / insights and openly received / provided feedback with other participants.    Shared experiences and challenges with making behavioral changes    Treatment Plan:  Patient has a current master individualized treatment plan.  See Epic treatment plan for more information.    JEREMY Witt

## 2023-11-08 NOTE — GROUP NOTE
Psychotherapy Group Note    PATIENT'S NAME: Julita Fernandez  MRN:   [id number]  :   2000  ACCT. NUMBER: [id number]  DATE OF SERVICE: 23  START TIME: 11:00 AM  END TIME: 11:50 AM  FACILITATOR: Kristina Upton LGSW  TOPIC: MH EBP Group: Emotions Management  [facility] Mental [facility] Outpatient Programs  TRACK: IOP 1 GM    NUMBER OF PARTICIPANTS: 6    Summary of Group / Topics Discussed:  Emotions Management: Opposite to Emotion: Patients discussed past and present struggles with knowing how to make changes in their lives due to difficult emotional experiences.  Explored desires to experience and feel less anger, sadness, guilt, and fear.  Reviewed the therapeutic skill of opposite action and patients explored opportunities to use their behaviors as a tool to reduce an emotion that they want to change.     Patient Session Goals / Objectives:  Review DBT concepts and focus on patient s experiences of distress and difficult emotional experiences.  Learn how to do the opposite of what an emotion makes us want to do in an effort to decrease an unwanted emotional experience.  Demonstrate understanding of the skill of opposite action by sharing experiences where the technique could be useful in past / present situations.      Patient Participation / Response:  Fully participated with the group by sharing personal reflections / insights and openly received / provided feedback with other participants.    Demonstrated understanding of topics discussed through group discussion and participation, Expressed understanding of the relevance / importance of emotions management skills at distressing times in life, Self-aware of experiences with difficult emotions, and strategies to employ to manage them, Demonstrated knowledge of when to consider applying a variety of emotions management skills in daily life, Demonstrated understanding and practice strategies to manage difficult emotions and move towards  healing, Identified barriers to applying emotions management strategies, Identified strategies to overcome barriers to use of emotions management skills, Identified emotions management strategies that have helped maintain / improve symptoms in the past, and Practiced 2-3 new skills in session    Treatment Plan:  Patient has a current master individualized treatment plan.  See Epic treatment plan for more information.    Kristina Upton LGSW

## 2023-11-08 NOTE — GROUP NOTE
Process Group Note    PATIENT'S NAME: Julita Fernandez  MRN:   [id number]  :   2000  ACCT. NUMBER: [id number]  DATE OF SERVICE: 23  START TIME:  9:00 AM  END TIME:  9:50 AM  FACILITATOR: Deyanira Go [facility]  TOPIC:  Process Group    Diagnoses:  296.32 (F33.1) Major Depressive Disorder, Recurrent Episode, Moderate _ and With melancholic features  300.02 (F41.1) Generalized Anxiety Disorder.  4. Other Diagnoses that is relevant to services:   300.01 (F41.0) Panic Disorder  309.81 (F43.10) Posttraumatic Stress Disorder (includes Posttraumatic Stress Disorder for Children 6 Years and Younger)  With dissociative symptoms.  5. Provisional Diagnosis:  Substance-Related & Addictive Disorders Alcohol Use Disorder   305.00 (F10.10) Mild In a controlled environment  305.20 (F12.10) Cannabis Use Disorder Mild  In a controlled environment.    [facility] Outpatient Programs  TRACK: [facility]     NUMBER OF PARTICIPANTS: 4        Data:    Session content: At the start of this group, patients were invited to check in by identifying themselves, describing their current emotional status, and identifying issues to address in this group.   Area(s) of treatment focus addressed in this session included Symptom Management.  Julita discussed creating boundaries within her relationship with her boyfriend, and the journey she has been on to establish them. She reported feeling an immense amount of guilt and shame over having sexual intercourse with another male. She reported feeling disappointed in herself. She reported a goal was to not become overly anxious or panicked.     Therapeutic Interventions/Treatment Strategies:  Treatment modalities used include Cognitive Behavioral Therapy and Dialectical Behavioral Therapy.    Assessment:    Patient response:   Patient responded to session by accepting feedback    Possible barriers to participation / learning include: and no barriers  identified    Health Issues:   None reported       Substance Use Review:   Substance Use: No active concerns identified.    Mental Status/Behavioral Observations  Appearance:   Appropriate   Eye Contact:   Good   Psychomotor Behavior: Normal   Attitude:   Cooperative   Orientation:   All  Speech   Rate / Production: Normal    Volume:  Normal   Mood:    Normal  Affect:    Appropriate   Thought Content:   Clear  Thought Form:  Coherent  Logical     Insight:    Good     Plan:   Safety Plan: No current safety concerns identified.  Recommended that patient call 911 or go to the local ED should there be a change in any of these risk factors.   Barriers to treatment: None identified  Patient Contracts (see media tab):  None  Substance Use: Not addressed in session   Continue or Discharge: Patient will continue in Adult Day Treatment (ADT)  as planned. Patient is likely to benefit from learning and using skills as they work toward the goals identified in their treatment plan.      Deyanira Hayes, [facility]  November 8, 2023

## 2023-11-09 ENCOUNTER — HOSPITAL ENCOUNTER (OUTPATIENT)
Dept: BEHAVIORAL HEALTH | Facility: CLINIC | Age: 23
Discharge: HOME | End: 2023-11-09
Attending: PSYCHIATRY & NEUROLOGY
Payer: COMMERCIAL

## 2023-11-09 PROCEDURE — 90853 GROUP PSYCHOTHERAPY: CPT

## 2023-11-09 NOTE — GROUP NOTE
Psychotherapy Group Note    PATIENT'S NAME: Julita Fernandez  MRN:   [id number]  :   2000  ACCT. NUMBER: [id number]  DATE OF SERVICE: 23  START TIME: 10:00 AM  END TIME: 10:50 AM  FACILITATOR: Nessa Curry LICSW  TOPIC: MH EBP Group: Coping Skills  [facility] Adult Mental Health Outpatient Programs  TRACK: IOP 1    NUMBER OF PARTICIPANTS: 6    Summary of Group / Topics Discussed:  Coping Skills: Building Positive Experiences/GLADLY GO: Patients discussed the importance of planning and engaging in positive experiences, as strategies to increase positive thinking, hope, and self-worth.  Explored the benefits of acknowledging accomplishments and areas of gratitude      Patient Session Goals / Objectives:  Understand the purpose of planning / creating / participating / sharing in positive experiences.  Explore patient s experiences related to negative thinking and how it influences activities and moodIdentify current positive events in patient s life.   Completed GLADLY GO exercise  Address barriers to planning / engaging in positive experiences.      Patient Participation / Response:  Fully participated with the group by sharing personal reflections / insights and openly received / provided feedback with other participants.    Demonstrated understanding of topics discussed through group discussion and participation, Expressed understanding of the relevance / importance of coping skills at distressing times in life, Demonstrated knowledge of when to consider using a variety of coping skills in daily life, Identified barriers to applying coping skills, and Identified / Expressed personal readiness to practice new coping skills    Treatment Plan:  Patient has a current master individualized treatment plan.  See Epic treatment plan for more information.    JEREMY Soto

## 2023-11-09 NOTE — GROUP NOTE
"Process Group Note    PATIENT'S NAME: Julita Fernandez  MRN:   [id number]  :   2000  ACCT. NUMBER: [id number]  DATE OF SERVICE: 23  START TIME:  9:00 AM  END TIME:  9:50 AM  FACILITATOR: Nessa Curry LICSW  TOPIC:  Process Group    Diagnoses:  296.32 (F33.1) Major Depressive Disorder, Recurrent Episode, Moderate _ and With melancholic features  300.02 (F41.1) Generalized Anxiety Disorder.  4. Other Diagnoses that is relevant to services:   300.01 (F41.0) Panic Disorder  309.81 (F43.10) Posttraumatic Stress Disorder (includes Posttraumatic Stress Disorder for Children 6 Years and Younger)  With dissociative symptoms.  5. Provisional Diagnosis:  Substance-Related & Addictive Disorders Alcohol Use Disorder   305.00 (F10.10) Mild In a controlled environment  305.20 (F12.10) Cannabis Use Disorder Mild  In a controlled environment.    [facility] Outpatient Programs  TRACK: IOP 1    NUMBER OF PARTICIPANTS: 6        Data:    Session content: At the start of this group, patients were invited to check in by identifying themselves, describing their current emotional status, and identifying issues to address in this group.   Area(s) of treatment focus addressed in this session included Symptom Management and Personal Safety.  Pt reports oversleeping and feeling \"out of it\". She reports a difficult few days following break-up with boyfriend. She feels uncertain about their future and can see unhealthy patterns in their relationship. Pt feels guilty for leaving him and sleeping with someone else. Her ex is now trying to get back together. She identifies coping skills as distraction, routine, walking her dogs and boundaries. She is grateful that her mother has recently been supportive. Denies safety concerns.    Therapeutic Interventions/Treatment Strategies:  Psychotherapist offered support, feedback and validation and reinforced use of skills. Treatment modalities used include " "Cognitive Behavioral Therapy. Interventions include Coping Skills: Discussed use of self-soothe skills to decrease distress in the body, Assisted patient in identifying 1-2 healthy distraction skills to reduce overall distress, and Discussed how the use of intentional \"in the moment\" actions can help reduce distress and Relationship Skills: Encouraged development and maintenance  of healthy boundaries.    Assessment:    Patient response:   Patient responded to session by accepting feedback, giving feedback, listening, focusing on goals, being attentive, and accepting support    Possible barriers to participation / learning include: and no barriers identified    Health Issues:   None reported       Substance Use Review:   Substance Use: alcohol  and cannabis .  and Last use: yesterday. Denies a problem    Mental Status/Behavioral Observations  Appearance:   Disheveled   Eye Contact:   Fair   Psychomotor Behavior: Retarded (Slowed)   Attitude:   Cooperative   Orientation:   All  Speech   Rate / Production: Normal    Volume:  Normal   Mood:    Anxious  Depressed  Fearful  Affect:    Tearful Worrisome   Thought Content:   Rumination  Thought Form:  Coherent  Obsessive     Insight:    Good     Plan:   Safety Plan: No current safety concerns identified.  Recommended that patient call 911 or go to the local ED should there be a change in any of these risk factors.   Barriers to treatment: None identified  Patient Contracts (see media tab):  None  Substance Use:  encouraged self-reflection    Continue or Discharge: Patient will continue in Adult Day Treatment (ADT)  as planned. Patient is likely to benefit from learning and using skills as they work toward the goals identified in their treatment plan.      Nessa Velázquez, [facility]  November 9, 2023  "

## 2023-11-09 NOTE — GROUP NOTE
Psychotherapy Group Note    PATIENT'S NAME: Julita Fernandez  MRN:   [id number]  :   2000  ACCT. NUMBER: [id number]  DATE OF SERVICE: 23  START TIME: 11:00 AM  END TIME: 11:50 AM  FACILITATOR: Kristina Upton LGSW  TOPIC: MH EBP Group: Emotions Management  [facility] Outpatient Programs  TRACK: IOP 1 GM    NUMBER OF PARTICIPANTS: 6    Summary of Group / Topics Discussed:  Emotions Management: Opposite to Emotion: Patients discussed past and present struggles with knowing how to make changes in their lives due to difficult emotional experiences.  Explored desires to experience and feel less anger, sadness, guilt, and fear.  Reviewed the therapeutic skill of opposite action and patients explored opportunities to use their behaviors as a tool to reduce an emotion that they want to change.     Patient Session Goals / Objectives:  Review DBT concepts and focus on patient s experiences of distress and difficult emotional experiences.  Learn how to do the opposite of what an emotion makes us want to do in an effort to decrease an unwanted emotional experience.  Demonstrate understanding of the skill of opposite action by sharing experiences where the technique could be useful in past / present situations.      Patient Participation / Response:  Fully participated with the group by sharing personal reflections / insights and openly received / provided feedback with other participants.    Demonstrated understanding of topics discussed through group discussion and participation, Expressed understanding of the relevance / importance of emotions management skills at distressing times in life, Self-aware of experiences with difficult emotions, and strategies to employ to manage them, Demonstrated knowledge of when to consider applying a variety of emotions management skills in daily life, Demonstrated understanding and practice strategies to manage difficult emotions and move towards  healing, Identified barriers to applying emotions management strategies, Identified strategies to overcome barriers to use of emotions management skills, Identified emotions management strategies that have helped maintain / improve symptoms in the past, and Practiced 2-3 new skills in session    Treatment Plan:  Patient has a current master individualized treatment plan.  See Epic treatment plan for more information.    Kristina Upotn LGSW

## 2023-11-13 ENCOUNTER — HOSPITAL ENCOUNTER (OUTPATIENT)
Dept: BEHAVIORAL HEALTH | Facility: CLINIC | Age: 23
Discharge: HOME | End: 2023-11-13
Attending: PSYCHIATRY & NEUROLOGY
Payer: COMMERCIAL

## 2023-11-13 PROCEDURE — 90853 GROUP PSYCHOTHERAPY: CPT

## 2023-11-13 NOTE — GROUP NOTE
Psychoeducation Group Note    PATIENT'S NAME: Julita Fernandez  MRN:   [id number]  :   2000  ACCT. NUMBER: [id number]  DATE OF SERVICE: 23  START TIME: 11:00 AM  END TIME: 11:50 AM  FACILITATOR: Kristina Upton LGSW  TOPIC: MH Wellness Group: Health Maintenance  [facility] Outpatient Programs  TRACK: IOP 1 plus 2 members from DT 1    NUMBER OF PARTICIPANTS: 8    Summary of Group / Topics Discussed:  Health Maintenance: Eight Dimensions of Wellness: The concept of holistic health through the model of eight dimensions was introduced. Group members participated in identifying behaviors and activities in each of the dimensions of wellness.  The importance of each dimension was reinforced and the concept of balance in life as it relates to wellness was explored.      Patient Session Goals / Objectives:  Verbalized understanding of balance in wellness and how it relates to their life  Identified and explained the eight dimensions of wellness  Categorized activities and wellness needs into corresponding dimensions appropriately during exercise        Patient Participation / Response:  Fully participated with the group by sharing personal reflections / insights and openly received / provided feedback with other participants.    Demonstrated understanding of topics discussed through group discussion and participation, Identified / Expressed personal readiness to practice skills, and Verbalized understanding of health maintenance topic    Treatment Plan:  Patient has a current master individualized treatment plan.  See Epic treatment plan for more information.    BARB Stone

## 2023-11-13 NOTE — GROUP NOTE
"Process Group Note    PATIENT'S NAME: Julita Fernandez  MRN:   [id number]  :   2000  ACCT. NUMBER: [id number]  DATE OF SERVICE: 23  START TIME:  9:00 AM  END TIME:  9:50 AM  FACILITATOR: Nessa Curry LICSW  TOPIC:  Process Group    Diagnoses:  296.32 (F33.1) Major Depressive Disorder, Recurrent Episode, Moderate _ and With melancholic features  300.02 (F41.1) Generalized Anxiety Disorder.  4. Other Diagnoses that is relevant to services:   300.01 (F41.0) Panic Disorder  309.81 (F43.10) Posttraumatic Stress Disorder (includes Posttraumatic Stress Disorder for Children 6 Years and Younger)  With dissociative symptoms.  5. Provisional Diagnosis:  Substance-Related & Addictive Disorders Alcohol Use Disorder   305.00 (F10.10) Mild In a controlled environment  305.20 (F12.10) Cannabis Use Disorder Mild  In a controlled environment.    [facility]  TRACK: IOP 1 and DT 1    NUMBER OF PARTICIPANTS: 9        Data:    Session content: At the start of this group, patients were invited to check in by identifying themselves, describing their current emotional status, and identifying issues to address in this group.   Area(s) of treatment focus addressed in this session included Symptom Management and Personal Safety.  Pt reports feeling overwhelmed, but proud of breaking up with boyfriend. Pt feels positive about writing a long letter to him and describing her feelings. Pt describes feeling dependent on him and wanting take a break from the relationship. She is relieved that he took it well, despite his struggles to care for himself. Pt sets a goal to not be in touch with him. She also plans to go to the gym today. Pt is proud of not overusing alcohol or cannabis following the break-up. She reports using to \"dangerous levels\" in the past. Denies safety concerns.    Therapeutic Interventions/Treatment Strategies:  Psychotherapist offered support, feedback and " validation and reinforced use of skills. Treatment modalities used include Cognitive Behavioral Therapy. Interventions include Behavioral Activation: Explored how behaviors effect mood and interact with thoughts and feelings, Coping Skills: Discussed use of self-soothe skills to decrease distress in the body and Reviewed patients current calming practices and discussed a more formal way of practicing and accessing skills, and Relationship Skills: Encouraged development and maintenance  of healthy boundaries.    Assessment:    Patient response:   Patient responded to session by accepting feedback, giving feedback, listening, focusing on goals, being attentive, and accepting support    Possible barriers to participation / learning include: and no barriers identified    Health Issues:   None reported       Substance Use Review:   Substance Use: alcohol  and cannabis .  and Last use: wknd    Mental Status/Behavioral Observations  Appearance:   Appropriate   Eye Contact:   Good   Psychomotor Behavior: Normal   Attitude:   Cooperative   Orientation:   All  Speech   Rate / Production: Normal    Volume:  Normal   Mood:    Anxious  Depressed   Affect:    Worrisome   Thought Content:   Rumination  Thought Form:  Coherent  Logical     Insight:    Fair     Plan:   Safety Plan: No current safety concerns identified.  Recommended that patient call 911 or go to the local ED should there be a change in any of these risk factors.   Barriers to treatment: None identified  Patient Contracts (see media tab):  None  Substance Use: Not addressed in session   Continue or Discharge: Patient will continue in Adult Day Treatment (ADT)  as planned. Patient is likely to benefit from learning and using skills as they work toward the goals identified in their treatment plan.      Nessa Velázquez, LICSW  November 13, 2023

## 2023-11-13 NOTE — GROUP NOTE
Psychotherapy Group Note    PATIENT'S NAME: Julita Fernandez  MRN:   [id number]  :   2000  ACCT. NUMBER: [id number]  DATE OF SERVICE: 23  START TIME: 10:00 AM  END TIME: 10:50 AM  FACILITATOR: Nessa Curry LICSW  TOPIC:  EBP Group: Self-Awareness  [facility]  TRACK: IOP 1 and DT 1    NUMBER OF PARTICIPANTS: 9    Summary of Group / Topics Discussed:  Self-Awareness: Personal Strengths: Topic focused on assisting patients in identifying personal strengths and how they relate to the management of mental health symptoms. Patients discussed the benefits of acknowledging their personal strengths and their impact on mood improvement, mindfulness, and perspective. Patients worked to increase time spent on recognition and appreciation of what is positive and working in their lives. The goal is to reduce rumination and negative thinking resulting in increased mindfulness and resilience. Patients will work to put skills into practice and problem-solve barriers.     Patient Session Goals / Objectives:  Identified personal strengths  Identified barriers to recognition of personal strengths  Verbalized understanding of strategies to increase use of their strengths in management of daily symptoms      Patient Participation / Response:  Fully participated with the group by sharing personal reflections / insights and openly received / provided feedback with other participants.    Demonstrated understanding of topics discussed through group discussion and participation, Demonstrated understanding of values, strengths, and challenges to learn about themselves, and Verbalized understanding of ways to proactively manage illness    Treatment Plan:  Patient has a current master individualized treatment plan.  See Epic treatment plan for more information.    JEREMY Soto

## 2023-11-14 ENCOUNTER — HOSPITAL ENCOUNTER (OUTPATIENT)
Dept: BEHAVIORAL HEALTH | Facility: CLINIC | Age: 23
Discharge: HOME | End: 2023-11-14
Attending: PSYCHIATRY & NEUROLOGY
Payer: COMMERCIAL

## 2023-11-14 PROCEDURE — 90853 GROUP PSYCHOTHERAPY: CPT

## 2023-11-14 NOTE — GROUP NOTE
"Process Group Note    PATIENT'S NAME: Julita Fernandez  MRN:   [id number]  :   2000  ACCT. NUMBER: [id number]  DATE OF SERVICE: 23  START TIME: 9:45 AM  END TIME:  9:50 AM  FACILITATOR: Nessa Curry LICSW  TOPIC:  Process Group    Diagnoses:  296.32 (F33.1) Major Depressive Disorder, Recurrent Episode, Moderate _ and With melancholic features  300.02 (F41.1) Generalized Anxiety Disorder.  4. Other Diagnoses that is relevant to services:   300.01 (F41.0) Panic Disorder  309.81 (F43.10) Posttraumatic Stress Disorder (includes Posttraumatic Stress Disorder for Children 6 Years and Younger)  With dissociative symptoms.  5. Provisional Diagnosis:  Substance-Related & Addictive Disorders Alcohol Use Disorder   305.00 (F10.10) Mild In a controlled environment  305.20 (F12.10) Cannabis Use Disorder Mild  In a controlled environment.    [facility]  TRACK: IOP 1 and DT 1    NUMBER OF PARTICIPANTS: 8        Data:    Session content: At the start of this group, patients were invited to check in by identifying themselves, describing their current emotional status, and identifying issues to address in this group.   Area(s) of treatment focus addressed in this session included Symptom Management and Personal Safety.  Pt arrives to group late, feeling upset and tearful. Pt feels mistreated and disrespected by mother. Pt is \"on a break\" with long time boyfriend and mother let him know that she went out with another steven yesterday. Pt describes mother's pattern of behavior as unpredictable and unsupportive. Pt feels stuck, living at home with parents, due to finances. Pt states \"all my relationships are messy right now\". She sets a goal to spend time with her dogs. Denies safety concerns.    Therapeutic Interventions/Treatment Strategies:  Psychotherapist offered support, feedback and validation and reinforced use of skills. Treatment modalities used include Cognitive " Behavioral Therapy. Interventions include Coping Skills: Discussed use of self-soothe skills to decrease distress in the body and Relationship Skills: Encouraged development and maintenance  of healthy boundaries.    Assessment:    Patient response:   Patient responded to session by accepting feedback, giving feedback, listening, focusing on goals, being attentive, and accepting support    Possible barriers to participation / learning include: and no barriers identified    Health Issues:   None reported       Substance Use Review:   Substance Use: alcohol  and cannabis .     Mental Status/Behavioral Observations  Appearance:   Appropriate   Eye Contact:   Good   Psychomotor Behavior: Normal   Attitude:   Cooperative   Orientation:   All  Speech   Rate / Production: Normal    Volume:  Normal   Mood:    Angry  Anxious  Depressed  Agitated  Affect:    Tearful Worrisome   Thought Content:   Rumination  Thought Form:  Coherent  Obsessive     Insight:    Fair     Plan:   Safety Plan: No current safety concerns identified.  Recommended that patient call 911 or go to the local ED should there be a change in any of these risk factors.   Barriers to treatment: None identified  Patient Contracts (see media tab):  None  Substance Use: Not addressed in session   Continue or Discharge: Patient will continue in Adult Day Treatment (ADT)  as planned. Patient is likely to benefit from learning and using skills as they work toward the goals identified in their treatment plan.      Nessa Velázquez, Northern [facility] A.R. Gould HospitalSW  November 14, 2023

## 2023-11-14 NOTE — GROUP NOTE
Psychotherapy Group Note    PATIENT'S NAME: Julita Fernandez  MRN:   [id number]  :   2000  ACCT. NUMBER: [id number]  DATE OF SERVICE: 23  START TIME: 10:00 AM  END TIME: 10:50 AM  FACILITATOR: Nessa Curry LICSW  TOPIC:  EBP Group: Self-Awareness  [facility]  TRACK: IOP 1 and DT 1    NUMBER OF PARTICIPANTS: 7    Summary of Group / Topics Discussed:  Self-Awareness: Personal Strengths part 2: Topic focused on assisting patients in identifying personal strengths and how they relate to the management of mental health symptoms. Patients discussed the benefits of acknowledging their personal strengths and their impact on mood improvement, mindfulness, and perspective. Patients worked to increase time spent on recognition and appreciation of what is positive and working in their lives. The goal is to reduce rumination and negative thinking resulting in increased mindfulness and resilience. Patients will work to put skills into practice and problem-solve barriers.     Patient Session Goals / Objectives:  Identified personal strengths  Identified barriers to recognition of personal strengths  Verbalized understanding of strategies to increase use of their strengths in management of daily symptoms      Patient Participation / Response:  Fully participated with the group by sharing personal reflections / insights and openly received / provided feedback with other participants.    Demonstrated understanding of topics discussed through group discussion and participation, Demonstrated understanding of values, strengths, and challenges to learn about themselves, and Verbalized understanding of ways to proactively manage illness    Treatment Plan:  Patient has a current master individualized treatment plan.  See Epic treatment plan for more information.    JEREMY Soto    yes

## 2023-11-14 NOTE — GROUP NOTE
Psychoeducation Group Note    PATIENT'S NAME: Julita Fernandez  MRN:   [id number]  :   2000  ACCT. NUMBER: [id number]  DATE OF SERVICE: 23  START TIME: 11:00 AM  END TIME: 11:50 AM  FACILITATOR: Kristina Upton LGSW  TOPIC: MH Wellness Group: Health Maintenance  [facility]  TRACK: IOP 1 including 2 patients from DT 1    NUMBER OF PARTICIPANTS: 7    Summary of Group / Topics Discussed:  Health Maintenance: Eight Dimensions of Wellness part 2: The concept of holistic health through the model of eight dimensions was introduced. Group members participated in identifying behaviors and activities in each of the dimensions of wellness.  The importance of each dimension was reinforced and the concept of balance in life as it relates to wellness was explored.      Patient Session Goals / Objectives:  Verbalized understanding of balance in wellness and how it relates to their life  Identified and explained the eight dimensions of wellness  Categorized activities and wellness needs into corresponding dimensions appropriately during exercise   Created wellness goals for the week       Patient Participation / Response:  Fully participated with the group by sharing personal reflections / insights and openly received / provided feedback with other participants.    Demonstrated understanding of topics discussed through group discussion and participation, Identified / Expressed personal readiness to practice skills, and Verbalized understanding of health maintenance topic    Treatment Plan:  Patient has a current master individualized treatment plan.  See Epic treatment plan for more information.    BARB Stone

## 2023-11-16 ENCOUNTER — TELEPHONE (OUTPATIENT)
Dept: BEHAVIORAL HEALTH | Facility: CLINIC | Age: 23
End: 2023-11-16

## 2023-11-16 ENCOUNTER — APPOINTMENT (OUTPATIENT)
Dept: CT IMAGING | Facility: HOSPITAL | Age: 23
End: 2023-11-16
Attending: EMERGENCY MEDICINE
Payer: COMMERCIAL

## 2023-11-16 ENCOUNTER — HOSPITAL ENCOUNTER (EMERGENCY)
Facility: HOSPITAL | Age: 23
Discharge: HOME | End: 2023-11-16
Attending: EMERGENCY MEDICINE | Admitting: EMERGENCY MEDICINE
Payer: COMMERCIAL

## 2023-11-16 VITALS
TEMPERATURE: 98.6 F | DIASTOLIC BLOOD PRESSURE: 94 MMHG | HEART RATE: 101 BPM | RESPIRATION RATE: 16 BRPM | OXYGEN SATURATION: 95 % | SYSTOLIC BLOOD PRESSURE: 137 MMHG

## 2023-11-16 DIAGNOSIS — G44.309 POST-CONCUSSION HEADACHE: ICD-10-CM

## 2023-11-16 DIAGNOSIS — S09.90XA HEAD INJURY, INITIAL ENCOUNTER: ICD-10-CM

## 2023-11-16 LAB — HCG UR QL: NEGATIVE

## 2023-11-16 PROCEDURE — 70450 CT HEAD/BRAIN W/O DYE: CPT

## 2023-11-16 PROCEDURE — 99284 EMERGENCY DEPT VISIT MOD MDM: CPT | Mod: 25

## 2023-11-16 PROCEDURE — 81025 URINE PREGNANCY TEST: CPT | Performed by: EMERGENCY MEDICINE

## 2023-11-16 RX ORDER — ONDANSETRON 4 MG/1
4 TABLET, ORALLY DISINTEGRATING ORAL EVERY 8 HOURS PRN
Qty: 10 TABLET | Refills: 0 | Status: SHIPPED | OUTPATIENT
Start: 2023-11-16 | End: 2024-05-15

## 2023-11-16 ASSESSMENT — ENCOUNTER SYMPTOMS
NERVOUS/ANXIOUS: 1
WOUND: 0
SPEECH DIFFICULTY: 0
NAUSEA: 1
VOMITING: 0
HEADACHES: 1

## 2023-11-16 ASSESSMENT — ACTIVITIES OF DAILY LIVING (ADL): ADLS_ACUITY_SCORE: 35

## 2023-11-16 NOTE — ED NOTES
Patient reports suicidal ideation but no thoughts or plan. States she is active outpatient therapy.
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
## 2023-11-17 ENCOUNTER — PATIENT OUTREACH (OUTPATIENT)
Dept: CARE COORDINATION | Facility: CLINIC | Age: 23
End: 2023-11-17
Payer: COMMERCIAL

## 2023-11-17 ENCOUNTER — HOSPITAL ENCOUNTER (EMERGENCY)
Facility: HOSPITAL | Age: 23
Discharge: HOME | End: 2023-11-17
Attending: EMERGENCY MEDICINE | Admitting: EMERGENCY MEDICINE
Payer: COMMERCIAL

## 2023-11-17 VITALS
OXYGEN SATURATION: 96 % | TEMPERATURE: 98.9 F | HEIGHT: 64 IN | BODY MASS INDEX: 18.78 KG/M2 | RESPIRATION RATE: 18 BRPM | WEIGHT: 110 LBS | SYSTOLIC BLOOD PRESSURE: 140 MMHG | DIASTOLIC BLOOD PRESSURE: 96 MMHG | HEART RATE: 115 BPM

## 2023-11-17 DIAGNOSIS — S61.011A LACERATION OF RIGHT THUMB WITHOUT FOREIGN BODY WITHOUT DAMAGE TO NAIL, INITIAL ENCOUNTER: ICD-10-CM

## 2023-11-17 PROCEDURE — 99282 EMERGENCY DEPT VISIT SF MDM: CPT

## 2023-11-17 PROCEDURE — 12001 RPR S/N/AX/GEN/TRNK 2.5CM/<: CPT

## 2023-11-17 NOTE — ED TRIAGE NOTES
"Pt arrives to triage ambulatory with boyfriend endorsing 4 shots of alcohol tonight endorsing has hx of alcohol abuse tonight is not the case pt reports. Pt endorsing when she was applying a new razor blade onto her razor as she pressed it and accidentally slid off and cut right thumb. Endorsing a lot of blood \"maybe from the alcohol\".            "

## 2023-11-17 NOTE — PROGRESS NOTES
Clinic Care Coordination Contact  Community Health Worker Initial Outreach    CHW Initial Information Gathering:  Referral Source: ED Follow-Up  Preferred Hospital: [facility]  [phone]  Current living arrangement:: I live in a private home with family (Lives with parents)  Type of residence:: Private home - stairs  Community Resources:  (Mental Health Day Treatment)  Supplies Currently Used at Home: None  Equipment Currently Used at Home: none  Informal Support system:: Family (Pt's mom helps with rides, pt reports she doesn't have anyone for informal/emotional support)  No PCP office visit in Past Year: No  Transportation means:: Family, Friend  CHW Additional Questions  If ED/Hospital discharge, follow-up appointment scheduled as recommended?: No  Patient agreeable to assistance with scheduling?: No  Patient declined (specify): Patient did not want to schedule ED follow up at this time  Medication changes made following ED/Hospital discharge?: Yes, patient to be scheduled with CC RN/SW within approx 1 business day  MyChart active?: Yes  Patient sent Social Determinants of Health questionnaire?: Yes    Patient accepts CC: Yes. Patient scheduled for assessment with CC SW on 11/21/23 at 3:00 PM. Patient noted desire to discuss finding a therapist that specializes in EMDR and establishing care with an allergist. She has been trying to look for a therapist who specializes in EMDR online but is finding this difficult. CHW also encouraged her to talk to her PCP for a referral to an Allergist/ENT.       Karen Lenz  Community Health Worker  [facility] [place]  Ph: [phone]

## 2023-11-17 NOTE — ED PROVIDER NOTES
EMERGENCY DEPARTMENT ENCOUNTER      NAME: Julita Fernandez  AGE: 23 year old female  YOB: 2000  MRN: [id number]  EVALUATION DATE & TIME: 11/17/2023  1:39 AM    PCP: Carolyn Latham    ED PROVIDER: Oralia Kumar MD    Chief Complaint   Patient presents with    Laceration         FINAL IMPRESSION:  1. Laceration of right thumb without foreign body without damage to nail, initial encounter          ED COURSE & MEDICAL DECISION MAKING:    Pertinent Labs & Imaging studies reviewed. (See chart for details)  23 year old female with history of anxiety/depression, alcohol and marijuana abuse who presents to the Emergency Department for evaluation of laceration to her right thumb after she accidentally cut it replacing a razor blade.  Laceration is superficial, repaired with Dermabond, please see procedure note.  Tetanus is up-to-date.  Discharged home.         1:39 AM I met with the patient, obtained history, performed an initial exam, and discussed options and plan for diagnostics and treatment here in the ED.  1:49 AM We discussed plans for discharge including supportive cares, symptomatic treatment, outpatient follow up, and reasons to return to the emergency department.    Medical Decision Making    History:  Supplemental history from: Friend  External Record(s) reviewed: Immunization records    Work Up:  Chart documentation includes differential considered and any EKGs or imaging independently interpreted by provider, see MDM  In additional to work up documented, I considered the following work up: see MDM    External consultation:  Discussion of management with another provider: N/A    Complicating factors:  Care impacted by chronic illness: Mental health  Care affected by social determinants of health: Access to Medical Care and Alcohol Abuse and/or Recreational Drug Use    Disposition considerations: Discharge. No recommendations on prescription strength medication(s). See documentation for  any additional details.        At the conclusion of the encounter I discussed the results of all of the tests and the disposition. The questions were answered. The patient or family acknowledged understanding and was agreeable with the care plan.        MEDICATIONS GIVEN IN THE EMERGENCY:  Medications - No data to display    NEW PRESCRIPTIONS STARTED AT TODAY'S ER VISIT  Discharge Medication List as of 11/17/2023  1:48 AM             =================================================================    HPI    Patient information was obtained from: Patient     Use of Intrepreter: N/A        Julita Fernandez is a 23 year old female with pertinent medical history of alcohol abuse, MELANY who presents to the emergency department by walk-in for evaluation for a laceration.    Tonight, patient was putting on a new razor blade when she accidentally cut her right thumb. She sustained a right thumb laceration. Patient reports drinking 4 alcoholic shots earlier tonight. She denies any other symptoms at this time. Her last Tdap was in 2022.      PAST MEDICAL HISTORY:  Past Medical History:   Diagnosis Date    Anxiety     Gastroesophageal reflux disease        PAST SURGICAL HISTORY:  No past surgical history on file.    CURRENT MEDICATIONS:    Prior to Admission Medications   Prescriptions Last Dose Informant Patient Reported? Taking?   gabapentin (NEURONTIN) 300 MG capsule   No No   Sig: Take 1 capsule (300 mg) by mouth 3 times daily as needed (severe anxiety)   levonorgestrel (MIRENA) 20 mcg/24 hours (5 yrs) 52 mg IUD   No No   Sig: [LEVONORGESTREL (MIRENA) 20 MCG/24 HOURS (5 YRS) 52 MG IUD] by Intrauterine route once for 1 dose.   melatonin 5 MG CAPS   Yes No   Sig: Take 5 mg by mouth nightly as needed (Sleep)   ondansetron (ZOFRAN ODT) 4 MG ODT tab   No No   Sig: Take 1 tablet (4 mg) by mouth every 8 hours as needed for nausea   pantoprazole (PROTONIX) 40 MG EC tablet   No No   Sig: Take 1 tablet (40 mg) by mouth daily  "  sertraline (ZOLOFT) 100 MG tablet   No No   Sig: Take 1 tablet (100 mg) by mouth daily      Facility-Administered Medications: None       ALLERGIES:  Allergies   Allergen Reactions    Soy [Isoflavones (Soy)] Other (See Comments)     Facial eczema        FAMILY HISTORY:  Family History   Problem Relation Age of Onset    Substance Abuse Father     Depression Father     Substance Abuse Paternal Grandmother     Depression Paternal Grandmother        SOCIAL HISTORY:  Social History     Tobacco Use    Smoking status: Never    Smokeless tobacco: Never   Vaping Use    Vaping Use: Former    Start date: 1/1/2020    Quit date: 5/1/2022    Substances: Nicotine, THC, Flavoring    Devices: Disposable, Pre-filled or refillable cartridge   Substance Use Topics    Alcohol use: Not Currently    Drug use: Yes     Frequency: 4.0 times per week     Types: Marijuana        VITALS:  Patient Vitals for the past 24 hrs:   BP Temp Temp src Pulse Resp SpO2 Height Weight   11/17/23 0139 -- -- -- -- -- -- -- 49.9 kg (110 lb)   11/17/23 0137 -- -- -- -- -- -- 1.626 m (5' 4\") --   11/17/23 0131 (!) 140/96 98.9  F (37.2  C) Oral 115 18 96 % -- --       PHYSICAL EXAM    General Appearance: Well-appearing, well-nourished, no acute distress   Head:  Normocephalic  Eyes:  conjunctiva/corneas clear  ENT:  membranes are moist without pallor  Cardio: Tach in the 110s  Pulm:  No respiratory distress  Extremities: Normal gait  Skin:  Skin warm, dry, no rashes. R thumb tip distal phalanx laceration.  Neuro:  Alert and oriented ×3, moving all extremities, no gross sensory defects         PROCEDURES:  PROCEDURE: Laceration Repair   INDICATIONS: Laceration   PROCEDURE PROVIDER: Dr Oralia Kumar   SITE: R thumb tip distal phalanx    TYPE/SIZE: simple, clean, and no foreign body visualized  1.2 cm (total length)   FUNCTIONAL ASSESSMENT: Distal sensation, circulation, and motor intact   MEDICATION: None   PREPARATION: scrubbing with Tap water   DEBRIDEMENT: no " debridement   CLOSURE:  Superficial layer closed with Dermabond (medical glue)    Total number of sutures/staples placed: 0           The creation of this record is based on the scribe s observations of the work being performed by Oralia Kumar MD and the provider s statements to them. It was created on her behalf by Richa Stephens, a trained medical scribe. This document has been checked and approved by the attending provider.    Oralia Kumar MD  Emergency Medicine  [facility] EMERGENCY DEPARTMENT  [address] [place] [id number]  [phone]  Dept: [phone]       Oralia Kumar MD  11/17/23 1706

## 2023-11-19 ENCOUNTER — TELEPHONE (OUTPATIENT)
Dept: NURSING | Facility: CLINIC | Age: 23
End: 2023-11-19

## 2023-11-19 ENCOUNTER — HOSPITAL ENCOUNTER (EMERGENCY)
Facility: HOSPITAL | Age: 23
Discharge: HOME | End: 2023-11-19
Attending: EMERGENCY MEDICINE | Admitting: EMERGENCY MEDICINE
Payer: COMMERCIAL

## 2023-11-19 VITALS
RESPIRATION RATE: 18 BRPM | TEMPERATURE: 98.8 F | OXYGEN SATURATION: 97 % | BODY MASS INDEX: 18.16 KG/M2 | DIASTOLIC BLOOD PRESSURE: 67 MMHG | HEART RATE: 105 BPM | WEIGHT: 105.82 LBS | SYSTOLIC BLOOD PRESSURE: 120 MMHG

## 2023-11-19 DIAGNOSIS — R11.2 NAUSEA AND VOMITING, UNSPECIFIED VOMITING TYPE: ICD-10-CM

## 2023-11-19 DIAGNOSIS — F41.1 ANXIETY REACTION: ICD-10-CM

## 2023-11-19 LAB
ANION GAP SERPL CALCULATED.3IONS-SCNC: 19 MMOL/L (ref 7–15)
BUN SERPL-MCNC: 12.9 MG/DL (ref 6–20)
CALCIUM SERPL-MCNC: 9.2 MG/DL (ref 8.6–10)
CHLORIDE SERPL-SCNC: 95 MMOL/L (ref 98–107)
CREAT SERPL-MCNC: 0.56 MG/DL (ref 0.51–0.95)
DEPRECATED HCO3 PLAS-SCNC: 24 MMOL/L (ref 22–29)
EGFRCR SERPLBLD CKD-EPI 2021: >90 ML/MIN/1.73M2
GLUCOSE SERPL-MCNC: 119 MG/DL (ref 70–99)
HCG SERPL QL: NEGATIVE
MAGNESIUM SERPL-MCNC: 2.1 MG/DL (ref 1.7–2.3)
POTASSIUM SERPL-SCNC: 3.8 MMOL/L (ref 3.4–5.3)
SODIUM SERPL-SCNC: 138 MMOL/L (ref 135–145)

## 2023-11-19 PROCEDURE — 250N000011 HC RX IP 250 OP 636: Performed by: EMERGENCY MEDICINE

## 2023-11-19 PROCEDURE — 99284 EMERGENCY DEPT VISIT MOD MDM: CPT | Mod: 25

## 2023-11-19 PROCEDURE — 84703 CHORIONIC GONADOTROPIN ASSAY: CPT | Performed by: EMERGENCY MEDICINE

## 2023-11-19 PROCEDURE — 36415 COLL VENOUS BLD VENIPUNCTURE: CPT | Performed by: EMERGENCY MEDICINE

## 2023-11-19 PROCEDURE — 96361 HYDRATE IV INFUSION ADD-ON: CPT

## 2023-11-19 PROCEDURE — 96375 TX/PRO/DX INJ NEW DRUG ADDON: CPT

## 2023-11-19 PROCEDURE — 80048 BASIC METABOLIC PNL TOTAL CA: CPT | Performed by: EMERGENCY MEDICINE

## 2023-11-19 PROCEDURE — 258N000003 HC RX IP 258 OP 636: Performed by: EMERGENCY MEDICINE

## 2023-11-19 PROCEDURE — 83735 ASSAY OF MAGNESIUM: CPT | Performed by: EMERGENCY MEDICINE

## 2023-11-19 PROCEDURE — 96374 THER/PROPH/DIAG INJ IV PUSH: CPT

## 2023-11-19 RX ORDER — ONDANSETRON 4 MG/1
4-8 TABLET, ORALLY DISINTEGRATING ORAL EVERY 8 HOURS PRN
Qty: 20 TABLET | Refills: 0 | Status: SHIPPED | OUTPATIENT
Start: 2023-11-19 | End: 2024-05-15

## 2023-11-19 RX ORDER — LORAZEPAM 2 MG/ML
1 INJECTION INTRAMUSCULAR ONCE
Status: COMPLETED | OUTPATIENT
Start: 2023-11-19 | End: 2023-11-19

## 2023-11-19 RX ORDER — ONDANSETRON 2 MG/ML
8 INJECTION INTRAMUSCULAR; INTRAVENOUS ONCE
Status: COMPLETED | OUTPATIENT
Start: 2023-11-19 | End: 2023-11-19

## 2023-11-19 RX ADMIN — LORAZEPAM 1 MG: 2 INJECTION INTRAMUSCULAR; INTRAVENOUS at 10:53

## 2023-11-19 RX ADMIN — ONDANSETRON 8 MG: 2 INJECTION INTRAMUSCULAR; INTRAVENOUS at 10:52

## 2023-11-19 RX ADMIN — SODIUM CHLORIDE 1000 ML: 9 INJECTION, SOLUTION INTRAVENOUS at 10:47

## 2023-11-19 ASSESSMENT — ACTIVITIES OF DAILY LIVING (ADL)
ADLS_ACUITY_SCORE: 35
ADLS_ACUITY_SCORE: 35

## 2023-11-19 NOTE — ED PROVIDER NOTES
EMERGENCY DEPARTMENT ENCOUNTER     NAME: Julita Fernandez   AGE: 23 year old female   YOB: 2000   MRN: [id number]   EVALUATION DATE & TIME: 11/19/2023 10:32 AM   PCP: Carolyn Latham     Chief Complaint   Patient presents with    Anxiety    Nausea   :    FINAL IMPRESSION       1. Anxiety reaction    2. Nausea and vomiting, unspecified vomiting type           ED COURSE & MEDICAL DECISION MAKING      Pertinent Labs & Imaging studies reviewed. (See chart for details)   10:37 AM I met with the patient, obtained history, performed an initial exam, and discussed options and plan for diagnostics and treatment here in the ED.  12:48 PM Checked in on and updated patient.     23 year old female  presents to the Emergency Department for evaluation of anxiety and panic, with nausea and vomiting.  Patient notes that she did not take her sertraline and then noted that she started to have increased anxiety.  It got so bad that she is now having nausea and vomiting and tried taking the meds and could not hold them down. Initial Vitals Reviewed. Initial exam notable for very anxious and tremulous patient who is retching into an empty emesis bag.  I suspect that this is a combination of anxiety reaction causing nausea and vomiting as well as potentially a bit of SSRI withdrawal contributing.  We did some labs which are reassuring without any significant electrolyte derangements or acute renal failure.  I treated her with some IV fluids, antiemetics with Zofran, and a dose of Ativan to control the anxiety.  She is now feeling significantly improved and is actually been able to take her oral sertraline and hold it down.  She is comfortable with discharge and I given her prescription for Zofran for any ongoing nausea at home.           At the conclusion of the encounter I discussed the results of all of the tests and the disposition. The questions were answered. The patient or family acknowledged understanding  and was agreeable with the care plan.     0 minutes critical care time, see procedure note below for details if relevant    Medical Decision Making    History:  Supplemental history from: Documented in chart, if applicable, friend  External Record(s) reviewed: Documented in chart, if applicable.,  Office visit for anxiety 11-23    Work Up:  Chart documentation includes differential considered and any EKGs or imaging independently interpreted by provider, where specified.  In additional to work up documented, I considered the following work up: Documented in chart, if applicable.    External consultation:  Discussion of management with another provider: Documented in chart, if applicable    Complicating factors:  Care impacted by chronic illness: Mental Health and Other: GERD  Care affected by social determinants of health: Access to Medical Care and Alcohol Abuse and/or Recreational Drug Use    Disposition considerations: Discharge. I prescribed additional prescription strength medication(s) as charted. I considered admission, but ultimately discharged patient with reassuring work-up and clinical improvement.        MEDICATIONS GIVEN IN THE EMERGENCY:   Medications   sodium chloride 0.9% BOLUS 1,000 mL (0 mLs Intravenous Stopped 11/19/23 1223)   LORazepam (ATIVAN) injection 1 mg (1 mg Intravenous $Given 11/19/23 1053)   ondansetron (ZOFRAN) injection 8 mg (8 mg Intravenous $Given 11/19/23 1052)      NEW PRESCRIPTIONS STARTED AT TODAY'S ER VISIT   New Prescriptions    No medications on file     ================================================================   HISTORY OF PRESENT ILLNESS       Patient information was obtained from: patient   Use of Intrepreter: N/A   Julita Fernandez is a 23 year old female with history of anxiety, depression, GERD, alcohol abuse, marijuana abuse, and benzodiazepine withdrawal who presents by walk in for evaluation of anxiety and vomiting.     Patient reports she recently sustained a  concussion and was seen in the ED. Due to the visit and symptoms, she missed a couple doses of her sertraline. Since last night, she reports feeling anxious, panicked, and nauseated with vomiting. She had been unable to keep anything down. The patient has tried to take her medications but cannot keep those down either. No other complaints at this time.    ================================================================        PAST HISTORY     PAST MEDICAL HISTORY:   Past Medical History:   Diagnosis Date    Anxiety     Gastroesophageal reflux disease       PAST SURGICAL HISTORY:   History reviewed. No pertinent surgical history.   CURRENT MEDICATIONS:   gabapentin (NEURONTIN) 300 MG capsule  levonorgestrel (MIRENA) 20 mcg/24 hours (5 yrs) 52 mg IUD  melatonin 5 MG CAPS  ondansetron (ZOFRAN ODT) 4 MG ODT tab  pantoprazole (PROTONIX) 40 MG EC tablet  sertraline (ZOLOFT) 100 MG tablet      ALLERGIES:   Allergies   Allergen Reactions    Soy [Isoflavones (Soy)] Other (See Comments)     Facial eczema       FAMILY HISTORY:   Family History   Problem Relation Age of Onset    Substance Abuse Father     Depression Father     Substance Abuse Paternal Grandmother     Depression Paternal Grandmother       SOCIAL HISTORY:   Social History     Socioeconomic History    Marital status: Single    Number of children: 0    Years of education: 13    Highest education level: High school graduate   Occupational History    Occupation: Student   Tobacco Use    Smoking status: Never    Smokeless tobacco: Never   Vaping Use    Vaping Use: Former    Start date: 1/1/2020    Quit date: 5/1/2022    Substances: Nicotine, THC, Flavoring    Devices: Disposable, Pre-filled or refillable cartridge   Substance and Sexual Activity    Alcohol use: Not Currently    Drug use: Yes     Frequency: 4.0 times per week     Types: Marijuana    Sexual activity: Yes     Partners: Male     Birth control/protection: I.U.D.   Social History Narrative    [facility]  [employer] in [place]. Taking a break now to save money and find her career path. 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; thinking about going to [employer] after [employer] for [employer] tech    Living with her parents- Tian (cardiologist) & Marija Watson in high school, otherwise not vaping or smoking    Rare use of alcohol    Using THC almost daily in the mornings    Carolyn Latham MD         Social Determinants of Health     Financial Resource Strain: Low Risk  (11/2/2023)    Financial Resource Strain     Within the past 12 months, have you or your family members you live with been unable to get utilities (heat, electricity) when it was really needed?: No   Food Insecurity: Low Risk  (11/2/2023)    Food Insecurity     Within the past 12 months, did you worry that your food would run out before you got money to buy more?: No     Within the past 12 months, did the food you bought just not last and you didn t have money to get more?: No   Transportation Needs: Low Risk  (11/2/2023)    Transportation Needs     Within the past 12 months, has lack of transportation kept you from medical appointments, getting your medicines, non-medical meetings or appointments, work, or from getting things that you need?: No   Housing Stability: Low Risk  (11/2/2023)    Housing Stability     Do you have housing? : Yes     Are you worried about losing your housing?: No        VITALS  Patient Vitals for the past 24 hrs:   BP Temp Temp src Pulse Resp SpO2 Weight   11/19/23 1213 114/69 -- -- 93 -- 96 % --   11/19/23 1203 129/73 -- -- 98 -- 97 % --   11/19/23 1146 126/66 -- -- 104 -- 96 % --   11/19/23 1132 137/80 -- -- 110 -- 98 % --   11/19/23 1113 128/72 -- -- 103 -- 96 % --   11/19/23 1112 132/71 -- -- 104 -- 97 % --   11/19/23 1031 132/86 98.8  F (37.1  C) Temporal 120 18 97 % 48 kg (105 lb 13.1 oz)        ================================================================    PHYSICAL EXAM     VITAL SIGNS: /69    Pulse 93   Temp 98.8  F (37.1  C) (Temporal)   Resp 18   Wt 48 kg (105 lb 13.1 oz)   LMP  (LMP Unknown)   SpO2 96%   BMI 18.16 kg/m     Constitutional:  Anxious and tremulous. Awake, no acute distress   HENT:  Atraumatic, oropharynx without exudate or erythema, membranes moist  Lymph:  No adenopathy  Eyes: EOM intact, PERRL, no injection  Neck: Supple  Respiratory:  Clear to auscultation bilaterally, no wheezes or crackles   Cardiovascular:  Regular rate and rhythm, single S1 and S2   GI:  Soft, nontender, nondistended, no rebound or guarding   Musculoskeletal:  Moves all extremities, no lower extremity edema, no deformities    Skin:  Warm, dry  Neurologic:  Alert and oriented x3, no focal deficits noted       ================================================================  LAB       All pertinent labs reviewed and interpreted.   Labs Ordered and Resulted from Time of ED Arrival to Time of ED Departure   BASIC METABOLIC PANEL - Abnormal       Result Value    Sodium 138      Potassium 3.8      Chloride 95 (*)     Carbon Dioxide (CO2) 24      Anion Gap 19 (*)     Urea Nitrogen 12.9      Creatinine 0.56      GFR Estimate >90      Calcium 9.2      Glucose 119 (*)    MAGNESIUM - Normal    Magnesium 2.1     HCG QUALITATIVE PREGNANCY - Normal    hCG Serum Qualitative Negative          ===============================================================  RADIOLOGY       Reviewed all pertinent imaging. Please see official radiology report.   No orders to display         ================================================================  EKG         I have independently reviewed and interpreted the EKG(s) documented above.     ================================================================  PROCEDURES         I, Aurora Ko, am serving as a scribe to document services personally performed by Dr. Stanley based on my observation and the provider's statements to me. I, Mala Stanley MD attest that Aurora Ko is acting  in a scribe capacity, has observed my performance of the services and has documented them in accordance with my direction.   Mala Stanley M.D.       Emergency Medicine   [facility] EMERGENCY DEPARTMENT  [address] [place] [id number] [phone]  Dept: [phone]        Mala Stanley MD  11/19/23 3634

## 2023-11-19 NOTE — ED TRIAGE NOTES
She has not been able to take her antianxiety meds. Now she has nausea. She was seen two days ago here for head injury.

## 2023-11-19 NOTE — TELEPHONE ENCOUNTER
Pt reports she was seen in the ED today for a panic attack. States she feels better but is wondering what medication they gave her. Writer discussed the following:        Pt did not have any additional questions or concerns. No need for triage at this time.            Nohelia Bazzi RN on 11/19/2023 at 2:41 PM

## 2023-11-19 NOTE — DISCHARGE INSTRUCTIONS
As we discussed, this is likely a combination of issues and that once you were not able to take your Zoloft your body went into some withdrawal from it and this worsens symptoms of things like headache and vomiting.  We seem to have it under control now with nausea medicine and anxiety medicine.  I am prescribing some additional nausea medicine to try at home in case this recurs.

## 2023-11-20 ENCOUNTER — HOSPITAL ENCOUNTER (OUTPATIENT)
Dept: BEHAVIORAL HEALTH | Facility: CLINIC | Age: 23
Discharge: HOME | End: 2023-11-20
Attending: PSYCHIATRY & NEUROLOGY
Payer: COMMERCIAL

## 2023-11-20 ENCOUNTER — TELEPHONE (OUTPATIENT)
Dept: FAMILY MEDICINE | Facility: CLINIC | Age: 23
End: 2023-11-20
Payer: COMMERCIAL

## 2023-11-20 ENCOUNTER — PATIENT OUTREACH (OUTPATIENT)
Dept: CARE COORDINATION | Facility: CLINIC | Age: 23
End: 2023-11-20
Payer: COMMERCIAL

## 2023-11-20 DIAGNOSIS — F41.9 ANXIETY DISORDER, UNSPECIFIED TYPE: ICD-10-CM

## 2023-11-20 PROCEDURE — 90853 GROUP PSYCHOTHERAPY: CPT

## 2023-11-20 RX ORDER — GABAPENTIN 300 MG/1
300 CAPSULE ORAL 3 TIMES DAILY PRN
Qty: 60 CAPSULE | Refills: 3 | Status: SHIPPED | OUTPATIENT
Start: 2023-11-20

## 2023-11-20 NOTE — TELEPHONE ENCOUNTER
S-(situation): gabapentin refill    B-(background): pt calling in stating she needs a refill on her gabapentin. She states that she only has 1 tablet left. She is unsure if she misplaced any but only has 1 tablet left. She states she has a history of a concussion and when filling out her meds noticed she only has 1 left and needs them for this week for her panic attacks. Writer questioned if this medication is working for her. She states she does take this TID and still has irritability but it helps. In past has been on ativan, hydroxyzine, and lexapro with no relief.     Outgoing call to [facility], they state for first couple of hours to [facility] their computer system had been down and that they were unable to bill any insurance on their end. They state Last fill 10/12, working on refill today, pt should be able to  today.     Outgoing call to pt to inform her that [facility] should have gabapentin filled today.     Pt informed this is her last fill and to notify PCP, pt asking writer if she will notify Carolyn Sal instead.      R-(recommendations): routing pended gabapentin script to PCP per patient request
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
## 2023-11-20 NOTE — TELEPHONE ENCOUNTER
Please let pt know I updated her Rx and for some reason PCP was listed incorrectly so I put my name back there   Please see if she would like ER follow up visit- she's had a 3  since our 11/2 visit for various issues.

## 2023-11-20 NOTE — PROGRESS NOTES
Clinic Care Coordination Contact  Community Health Worker Initial Outreach    Patient accepts CC: CHW received message from [facility] ELINOR Hess to reschedule patients SW Assessment on 11/21/23 @ 3:00.     CHW called and  spoke with patient; [facility] Assessment has been rescheduled to 11/27/23 @ 3:00.    Patient noted desire to discuss: finding a therapist that specializes in EMDR and establishing care with an allergist. She has been trying to look for a therapist who specializes in EMDR online but is finding this difficult. CHW also encouraged her to talk to her PCP for a referral to an Allergist/ENT.           Mitra Alexander  Community Health Worker  [facility]  Clinic Care Coordination   [facility], [facility], [place] and [facility]  Office: [phone]

## 2023-11-21 ENCOUNTER — TELEPHONE (OUTPATIENT)
Dept: BEHAVIORAL HEALTH | Facility: CLINIC | Age: 23
End: 2023-11-21

## 2023-11-21 ENCOUNTER — HOSPITAL ENCOUNTER (OUTPATIENT)
Dept: BEHAVIORAL HEALTH | Facility: CLINIC | Age: 23
Discharge: HOME | End: 2023-11-21
Attending: PSYCHIATRY & NEUROLOGY
Payer: COMMERCIAL

## 2023-11-21 PROCEDURE — 90853 GROUP PSYCHOTHERAPY: CPT

## 2023-11-21 NOTE — TELEPHONE ENCOUNTER
11-21-23  Pt called w/ status of her medication, I stated it was called into [facility] 11-20.  Pt also made  a ED follow up for 12-1  [place]
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
## 2023-11-21 NOTE — GROUP NOTE
Psychotherapy Group Note    PATIENT'S NAME: Julita Fernandez  MRN:   [id number]  :   2000  ACCT. NUMBER: [id number]  DATE OF SERVICE: 23  START TIME: 10:00 AM  END TIME: 10:50 AM  FACILITATOR: Nessa Curry LICSW  TOPIC:  EBP Group: Self-Awareness  [facility] Adult Mental Health Outpatient Programs  TRACK: IOP 1    NUMBER OF PARTICIPANTS: 7    Summary of Group / Topics Discussed:  Self-Awareness: Personal Strengths: Topic focused on assisting patients in identifying personal strengths and how they relate to the management of mental health symptoms. Patients discussed the benefits of acknowledging their personal strengths and their impact on mood improvement, mindfulness, and perspective. Patients worked to increase time spent on recognition and appreciation of what is positive and working in their lives. The goal is to reduce rumination and negative thinking resulting in increased mindfulness and resilience. Patients will work to put skills into practice and problem-solve barriers.     Patient Session Goals / Objectives:  Identified personal strengths  Identified barriers to recognition of personal strengths  Verbalized understanding of strategies to increase use of their strengths in management of daily symptoms      Patient Participation / Response:  Fully participated with the group by sharing personal reflections / insights and openly received / provided feedback with other participants.    Demonstrated understanding of topics discussed through group discussion and participation, Demonstrated understanding of values, strengths, and challenges to learn about themselves, and Identified / Expressed readiness to act intentionally, increase self-compassion, promote personal growth    Treatment Plan:  Patient has a current master individualized treatment plan.  See Epic treatment plan for more information.    JEREMY Soto

## 2023-11-22 ENCOUNTER — HOSPITAL ENCOUNTER (OUTPATIENT)
Dept: BEHAVIORAL HEALTH | Facility: CLINIC | Age: 23
Discharge: HOME | End: 2023-11-22
Attending: PSYCHIATRY & NEUROLOGY
Payer: COMMERCIAL

## 2023-11-22 PROCEDURE — 90853 GROUP PSYCHOTHERAPY: CPT

## 2023-11-27 ENCOUNTER — PATIENT OUTREACH (OUTPATIENT)
Dept: CARE COORDINATION | Facility: CLINIC | Age: 23
End: 2023-11-27

## 2023-11-27 ENCOUNTER — TELEPHONE (OUTPATIENT)
Dept: BEHAVIORAL HEALTH | Facility: CLINIC | Age: 23
End: 2023-11-27

## 2023-11-27 NOTE — LETTER
[facility] CARE COORDINATION  [address] [place] [address]   November 27, 2023    Julita Fernandez  [address]  [place] [address]      Dear Julita,    I am a clinic care coordinator who works with Carolyn Latham MD with the [facility]. I recently tried to call and was unable to reach you. Below is a description of clinic care coordination and how I can further assist you.       The clinic care coordination team is made up of a registered nurse, , financial resource worker and community health worker who understand the health care system. The goal of clinic care coordination is to help you manage your health and improve access to the health care system. Our team works alongside your provider to assist you in determining your health and social needs. We can help you obtain health care and community resources, providing you with necessary information and education. We can work with you through any barriers and develop a care plan that helps coordinate and strengthen the communication between you and your care team.  Our services are voluntary and are offered without charge to you personally.    Please feel free to contact me with any questions or concerns regarding care coordination and what we can offer.      We are focused on providing you with the highest-quality healthcare experience possible.    Sincerely,     Jimena Ca, [facility] Clinical Care Coordinator  [facility], [facility], [facility], and [facility]   [phone]

## 2023-11-27 NOTE — TELEPHONE ENCOUNTER
Spoke with patient's individual therapist about recent conflict at home and escalating fear/anxiety. Let her know that [place] is [place] out of [place] and not planning on attending group this week. Diane is scheduled to see patient on 11/29.

## 2023-11-27 NOTE — PROGRESS NOTES
Clinic Care Coordination Contact  [facility]/Voicemail    Clinical Data: Care Coordinator Outreach    Outreach Documentation Number of Outreach Attempt   11/27/2023   3:45 PM 2   11/28/2023   8:43 AM 3       Left message on patient's voicemail with call back information and requested return call.    Plan: Care Coordinator sent care coordination introduction letter on 11/27/23 via [employer]. Care Coordinator will do no further outreaches at this time. CCC program closed. Pt has this [facility]'s phone number via  and my chart. If Pt reaches out, CCC program will open.     [facility] received VM from Pt regarding IOP which they are currently missing; will be able to return 12/1/23. [facility] updated JEREMY Mejias who has been reaching out to Pt about IOP attendance.

## 2023-11-28 ENCOUNTER — TELEPHONE (OUTPATIENT)
Dept: BEHAVIORAL HEALTH | Facility: CLINIC | Age: 23
End: 2023-11-28
Payer: COMMERCIAL

## 2023-11-28 NOTE — TELEPHONE ENCOUNTER
Spoke with Julita regarding current situation. She is safe and staying with friends in [place]. She will be there until Friday, when she will attend a family counseling session. That session will focus on finding alternative housing. Pt denies safety concerns and will return to [facility] 12/4/23.

## 2023-11-30 ENCOUNTER — TELEPHONE (OUTPATIENT)
Dept: BEHAVIORAL HEALTH | Facility: CLINIC | Age: 23
End: 2023-11-30
Payer: COMMERCIAL

## 2023-12-01 ENCOUNTER — TRANSFERRED RECORDS (OUTPATIENT)
Dept: HEALTH INFORMATION MANAGEMENT | Facility: CLINIC | Age: 23
End: 2023-12-01

## 2023-12-04 ENCOUNTER — HOSPITAL ENCOUNTER (OUTPATIENT)
Dept: BEHAVIORAL HEALTH | Facility: CLINIC | Age: 23
Discharge: HOME | End: 2023-12-04
Attending: PSYCHIATRY & NEUROLOGY
Payer: COMMERCIAL

## 2023-12-04 DIAGNOSIS — F41.9 ANXIETY: ICD-10-CM

## 2023-12-04 DIAGNOSIS — F33.1 MDD (MAJOR DEPRESSIVE DISORDER), RECURRENT EPISODE, MODERATE (H): Primary | ICD-10-CM

## 2023-12-04 PROCEDURE — 99214 OFFICE O/P EST MOD 30 MIN: CPT

## 2023-12-04 PROCEDURE — 90853 GROUP PSYCHOTHERAPY: CPT

## 2023-12-04 NOTE — PROGRESS NOTES
"[facility]   Adult Mental Health Outpatient Programs  Provider Interval History Note    Program: Intensive Outpatient Program, track 1     PATIENT'S NAME: Julita Fernandez  MRN:   [id number]  :   2000  ACCT. NUMBER: [id number]  DATE OF SERVICE: 23    Interval History:  \"I am ok, doing very good.\" Julita presents today for follow-up and ongoing program supervision.   Endorses:  I am worried of the time I missed group, I would dao to stay longer.   I am feeling very anxious today. The last 10 days were very chaotic. Housing and many other things  Depression has been very bad as well  No cutting recently  Increased Suicidal ideation last week. No plan and no intent  I have been smoking marijuana everyday. I have medical cannabis card  Been drinking, 2 glasses mixed drinks of alcohol everyday  Medication  Compliant with All medication  Sertraline 100 mg daily  Gabapentin 300 mg three times a day as needed   Usually 2-3 times a day. Most  day 2 times    Symptoms/Systems:  Sleep: Not great. Difficult and staying asleep. I also run out the melatonin  Appetite: Pretty low due to stress  Daily function/ADLs: Struggling due to recent many changes in housing  Hygiene: No noted concerns  Socialization: isolated  Concerns about work or ability to work: No working      Substance use:  See above    Reactions/thoughts about program:  I need more times to cover the lost time I was absent due to housing issues    Safety Assessment:  Suicidal ideation: has passive suicidal thoughts described as no intent, no plan  Thoughts of non-suicidal self-injury: denied  Recent self-injurious behavior: denied  Homicidal ideation: denied  Other safety concerns: denied    Medications:  Current Outpatient Medications   Medication Sig Dispense Refill    cephALEXin (KEFLEX) 500 MG capsule 500 mg      gabapentin (NEURONTIN) 300 MG capsule Take 1 capsule (300 mg) by mouth 3 times daily as needed (severe " "anxiety) 60 capsule 3    melatonin 5 MG CAPS Take 5 mg by mouth nightly as needed (Sleep)      ondansetron (ZOFRAN ODT) 4 MG ODT tab Take 1-2 tablets (4-8 mg) by mouth every 8 hours as needed for nausea 20 tablet 0    ondansetron (ZOFRAN ODT) 4 MG ODT tab Take 1 tablet (4 mg) by mouth every 8 hours as needed for nausea 10 tablet 0    pantoprazole (PROTONIX) 40 MG EC tablet Take 1 tablet (40 mg) by mouth daily 90 tablet 1    sertraline (ZOLOFT) 100 MG tablet Take 1 tablet (100 mg) by mouth daily 90 tablet 1    levonorgestrel (MIRENA) 20 mcg/24 hours (5 yrs) 52 mg IUD [LEVONORGESTREL (MIRENA) 20 MCG/24 HOURS (5 YRS) 52 MG IUD] by Intrauterine route once for 1 dose. 1 each 0       The above list was reviewed and updated in EPIC with patient today.     Patient is taking medications as prescribed and denies adverse effects    Laboratory Results:  Most recent labs reviewed. Pertinent updates/findings: None.     Metrics:  PHQ-9 scores:       12/20/2022     4:06 PM 6/13/2023     2:29 PM 9/27/2023     1:25 PM 11/2/2023    12:01 PM   PHQ-9 SCORE   PHQ-9 Total Score MyChart 5 (Mild depression)  18 (Moderately severe depression) 15 (Moderately severe depression)   PHQ-9 Total Score 5 11 18 15    15       MELANY-7 scores:       6/13/2023     2:29 PM 9/18/2023     2:31 PM 11/2/2023    12:01 PM   MELANY-7 SCORE   Total Score  20 (severe anxiety) 18 (severe anxiety)   Total Score 13 20 18       CSSR-S: [place] Suicide Severity Rating Scale (Short Version)      9/19/2023    10:27 AM 9/19/2023     2:00 PM 9/19/2023     2:36 PM 9/28/2023     8:00 AM 11/16/2023     2:27 AM 11/17/2023     1:34 AM 11/19/2023    10:31 AM   [place] Suicide Severity Rating (Short Version)   Over the past 2 weeks have you felt down, depressed, or hopeless?     yes yes yes   Over the past 2 weeks have you had thoughts of killing yourself?     yes no no   Comments     suicidal ideation clarifies to say has suicidal ideations at times \"wake up as a different " "person\" \"my specialist says this is a suicidal ideation\", does not want to attempt or does not have thoughts of killing self.    Have you ever attempted to kill yourself?     yes yes no   When did this last happen?     more than 6 months ago more than 6 months ago    Comments      \"over 5 years ago\"    Q1 Wished to be Dead (Past Month) no  no yes no     Q2 Suicidal Thoughts (Past Month) yes  no yes no     Q3 Suicidal Thought Method no  no no      Q4 Suicidal Intent without Specific Plan no  no no      Q5 Suicide Intent with Specific Plan no  no no      Q6 Suicide Behavior (Lifetime) no   no      Within the Past 3 Months? no  no no      Level of Risk per Screen low risk  low risk low risk      Comments     charge nurse notified     1. Wish to be Dead (Since Last Contact)  N        2. Non-Specific Active Suicidal Thoughts (Since Last Contact)  N        Actual Attempt (Since Last Contact)  N        Has subject engaged in non-suicidal self-injurious behavior? (Since Last Contact)  N        Interrupted Attempts (Since Last Contact)  N        Aborted or Self-Interrupted Attempt (Since Last Contact)  N        Preparatory Acts or Behavior (Since Last Contact)  N        Suicide (Since Last Contact)  N        Calculated C-SSRS Risk Score (Since Last Contact)  No Risk Indicated              Mental Status Examination:  Vital Signs: LMP  (LMP Unknown)    Appearance: adequately groomed, appears stated age, and in no apparent distress.  Attitude: cooperative   Eye Contact: good   Muscle Strength and Tone: no gross abnormalities   Psychomotor Behavior: normal or unremarkable   Gait and Station: normal width, turn, arm swing  Speech: normal rate, production, volume, and rhythm of  Associations: No loosening of associations  Thought Process: coherent and goal directed  Thought Content: no evidence of psychotic thought, passive suicidal ideation present, no auditory hallucinations present, and no visual hallucinations present  Mood: " "\"angry and depressed\"  Affect: mood congruent  Insight: good  Judgment: intact, adequate for safety  Impulse Control: intact  Oriented to: time, place, person, and situation  Attention Span and Concentration: normal  Language: Intact  Recent and Remote Memory: intact to interview. Not formally assessed. No amnesia.  Fund of Knowledge/Assessment of Intelligence: Average  Capacity of Activities of Daily Living: Independent, able to participate in programmatic care services.    Diagnosis/es:    ICD-10-CM    1. MDD (major depressive disorder), recurrent episode, moderate (H)  F33.1       2. Anxiety  F41.9           Other Diagnoses that is relevant to services:     300.01 (F41.0) Panic Disorder  309.81 (F43.10) Posttraumatic Stress Disorder     Provisional Diagnosis:    305.00 (F10.10) Alcohol Use Disorder  Mild In a controlled environment  305.20 (F12.10) Cannabis Use Disorder Mild  In a controlled environment.    Assessment/Plan:  Julita presents today for a follow-up. She endorsed increased anxiety following a recent increase in stressors related to housing and missing therapy. The patient would like to extend therapy to cover the missed days. Although the patient reported an increase in suicide ideation, she denies intent or a plan. Denies recent instant of cutting. No additional safety concerns were reported. Compliant with Sertraline and using Gabapentin 2-3 times a day. No reported side effects. The patient was encouraged the patient to use all 3 doses of Gabapentin for now until the anxiety is under control. The Patient still meets the recommended level of IOP care criteria and is at a reasonable risk of requiring a higher level of care in the absence of current services. The patient agreed to the treatment plan    296.32 (F33.1) Major Depressive Disorder, Recurrent, Moderate and With melancholic features   Overall stable   Engage in psychotherapy  Continue working with primary care provider for medication " management  Continue with current medication regimen  Melatonin 5 mg daily  Gabapentin 300 mg 3 times daily as needed  Sertraline 50 mg daily.  Improve sleep hygiene  Maintain a balanced diet  Engage in physical activities as tolerated     300.02 (F41.1) Generalized Anxiety Disorder.   Overall stable  As noted above    Continue all other medications as reviewed per electronic medical record today    Continue therapy as planned:  Enrolled in Intensive Outpatient Program, track 1   Continues to benefit from services  Continues to meet criteria for this level of care  Patient would be at reasonable risk of requiring a higher level of care in the absence of current services.  I feel this patient does not meet criteria for an involuntary hold and is appropriate for treatment at an outpatient level of care.  Continue with individual therapist as appropriate    Safety plan reviewed:  To the Emergency Department as needed or call after hours crisis line at [phone] or [phone]. [place] Crisis Text Line: Text [place] to 912245 or Suicide LifeLine Chat: suicidepreventionlifeline.org/chat    Follow-up:   schedule an appointment with me or another program provider in approximately in 4 week(s) or sooner if needed.  Can speak with a staff member or call the appropriate program number (see below) to schedule  Follow up with outpatient provider(s) as planned or sooner if needed for acute medical concerns.    Questions or concerns:  Call program line with questions or concerns (see below)  VectorLearningt may be used to communicate with your provider, but this is not intended to be used for emergencies.    [facility] Adult Mental Health Program lines:  [facility]: [phone]  Dual Disorder: [phone]  Adult Day Treatment:  [phone]  55+/Intensive Outpatient: [phone]    Community Resources:    National Suicide Prevention Lifeline: 988 from any phone, or 632-902-8296 (TTY: 122.320.3908). Call anytime for help.   (www.suicidepreventionlifeline.org)  National [place] on Mental Illness (www.gilson.org): 763.442.8087 or 651-315-6431.   Mental Health Association (www.mentalhealth.org): 716.910.8722 or 369-934-6064.  [place] Crisis Text Line: Text [place] to 225333  Suicide LifeLine Chat: suicidepreSolexa.org/chat    Treatment Objective(s) Addressed in This Session:  The purpose of today's virtual visit is for this writer to provide oversight of patient's care while receiving program services. Specific treatment goals addressed included personal safety, symptoms stabilization and management, wellness and mental health, and community resources/discharge planning.     This author or another program provider will follow up with the patient as noted above.     Patient agrees with the current plan of care.    VALARIE SULLIVAN CNP  12/04/23      Visit Details:  Type of service: In-person    Location (patient and provider): [facility] Adult Mental Health Outpatient Programmatic Care Offices    Level of Medical Decision Making:   - At least 1 chronic problem that is not stable  - Engaged in prescription drug management during visit (discussed any medication benefits, side effects, alternatives, etc.)         30 min spent on the date of the encounter in chart review, patient visit, review of tests, documentation, care coordination, and/or discussion with other providers about the issues documented above.      This document completed in part using Dragon Medical One dictation software.  Please excuse any inadvertent word or phrase substitutions.

## 2023-12-04 NOTE — GROUP NOTE
Psychoeducation Group Note    PATIENT'S NAME: Julita Fernandez  MRN:   [id number]  :   2000  ACCT. NUMBER: [id number]  DATE OF SERVICE: 23  START TIME: 10:00 AM  END TIME: 10:50 AM  FACILITATOR: Nessa Curry LICSW  TOPIC: MH Wellness Group: [facility] Adult Mental Health Outpatient Programs  TRACK: IOP 1    NUMBER OF PARTICIPANTS: 6    Summary of Group / Topics Discussed:  Foundations of Health: Sleep:sleep hygiene: Patients explored the connection between sleep and mental illness. Patients learned about how adequate sleep can improve health, productivity, wellness, quality of life, and safety.     Patient Session Goals / Objectives:  Demonstrated understanding of sleep hygiene practices and benefits of sleep  Identified sleep hygiene strategies to utilize   Described the connection between sleep disturbances and mental illness      Patient Participation / Response:  Fully participated with the group by sharing personal reflections / insights and openly received / provided feedback with other participants.    Demonstrated understanding of topics discussed through group discussion and participation and Identified / Expressed personal readiness to practice skills    Treatment Plan:  Patient has a current master individualized treatment plan.  See Epic treatment plan for more information.    JEREMY Soto

## 2023-12-04 NOTE — GROUP NOTE
Psychotherapy Group Note    PATIENT'S NAME: Julita Fernandez  MRN:   [id number]  :   2000  ACCT. NUMBER: [id number]  DATE OF SERVICE: 23  START TIME: 11:00 AM  END TIME: 11:50 AM  FACILITATOR: Kristina Upton LGSW  TOPIC: MH EBP Group: Coping Skills  [facility] Adult Mental Health Outpatient Programs  TRACK: IOP 1 GM    NUMBER OF PARTICIPANTS: 5    Summary of Group / Topics Discussed:  Coping Skills: Self-Soothe: Patients learned to apply self-soothe as a way to decrease heightened stress in the moment.  Patients identified situations that necessitate self-soothe strategies.  They focused on ways to manage physical symptoms of distress using the senses. They discussed how to distinguish when this can be useful in their lives when other strategies are more relevant or helpful.    Patient Session Goals / Objectives:  Understand the purpose of using the senses to decrease distress  Process what happens in the body when using self-soothe strategies  Demonstrate understanding of when to use self-soothe strategies  Identify and problem solve barriers to applying self-soothe strategies.  Choose 1-2 self-soothe strategies to apply during times of distress.      Patient Participation / Response:  Fully participated with the group by sharing personal reflections / insights and openly received / provided feedback with other participants.    Demonstrated understanding of topics discussed through group discussion and participation, Expressed understanding of the relevance / importance of coping skills at distressing times in life, Demonstrated knowledge of when to consider using a variety of coping skills in daily life, Identified barriers to applying coping skills, Identified 2-3 positive coping strategies that have helped maintain / improve symptoms in the past, Practiced 2-3 new coping skills in session, and Identified / Expressed personal readiness to practice new coping skills    Treatment Plan:  Patient  has a current master individualized treatment plan.  See Epic treatment plan for more information.    Kristina Upton LGSW

## 2023-12-04 NOTE — GROUP NOTE
"Process Group Note    PATIENT'S NAME: Julita Fernandez  MRN:   [id number]  :   2000  ACCT. NUMBER: [id number]  DATE OF SERVICE: 23  START TIME:  9:00 AM  END TIME:  9:50 AM  FACILITATOR: Nessa Curry LICSW  TOPIC:  Process Group    Diagnoses:  296.32 (F33.1) Major Depressive Disorder, Recurrent Episode, Moderate _ and With melancholic features  300.02 (F41.1) Generalized Anxiety Disorder.  4. Other Diagnoses that is relevant to services:   300.01 (F41.0) Panic Disorder  309.81 (F43.10) Posttraumatic Stress Disorder (includes Posttraumatic Stress Disorder for Children 6 Years and Younger)  With dissociative symptoms.  5. Provisional Diagnosis:  Substance-Related & Addictive Disorders Alcohol Use Disorder   305.00 (F10.10) Mild In a controlled environment  305.20 (F12.10) Cannabis Use Disorder Mild  In a controlled environment.    [facility] Mental Health Outpatient Programs  TRACK: IOP 1    NUMBER OF PARTICIPANTS: 6        Data:    Session content: At the start of this group, patients were invited to check in by identifying themselves, describing their current emotional status, and identifying issues to address in this group.   Area(s) of treatment focus addressed in this session included Symptom Management and Personal Safety.  Pt returns to group after one week absence. She reports staying with an out of state friend, after her father became aggressive and the police were called. Pt describes his problem drinking, while mother continues to protect him. She reports observing how a \"normal\" family lives, while staying with friends. Pt had a family therapy session on Friday and mother agreed to pay for an apartment for her. Pt was hesitant to accept this offer, due to giving her parents too much control. She felt relieved when her grandma offered to  the rent, if mother quits paying it. She identifies coping skills as: boundaries and self-care. She is using medical marijuana and " "would like to quit drinking. Pt sets a goal to not drink tonight and if she does, she will seek more help. She denies safety concerns.    Therapeutic Interventions/Treatment Strategies:  Psychotherapist offered support, feedback and validation and reinforced use of skills. Treatment modalities used include Cognitive Behavioral Therapy. Interventions include Cognitive Restructuring:  Explored impact of ineffective thoughts / distortions on mood and activity and Assisted patient in identifying new neutral/positive core beliefs, Coping Skills: Discussed use of self-soothe skills to decrease distress in the body, Assisted patient in identifying 1-2 healthy distraction skills to reduce overall distress, and Discussed how the use of intentional \"in the moment\" actions can help reduce distress, and Relationship Skills: Encouraged development and maintenance  of healthy boundaries.    Assessment:    Patient response:   Patient responded to session by accepting feedback, giving feedback, listening, focusing on goals, being attentive, and accepting support    Possible barriers to participation / learning include: and no barriers identified    Health Issues:   Yes: Weight / dietary issues, Associated Psychological Distress       Substance Use Review:   Substance Use: alcohol  and cannabis .  and Last use: last night    Mental Status/Behavioral Observations  Appearance:   Disheveled   Eye Contact:   Good   Psychomotor Behavior: Retarded (Slowed)   Attitude:   Cooperative   Orientation:   All  Speech   Rate / Production: Normal    Volume:  Normal   Mood:    Anxious  Depressed  Fearful  Affect:    Tearful Worrisome   Thought Content:   Rumination  Thought Form:  Coherent     Insight:    Good     Plan:   Safety Plan: No current safety concerns identified.  Recommended that patient call 911 or go to the local ED should there be a change in any of these risk factors.   Barriers to treatment: None identified  Patient Contracts (see " media tab):  None  Substance Use: Provided encouragement towards sobriety   Continue or Discharge: Patient will continue in Adult Day Treatment (ADT)  as planned. Patient is likely to benefit from learning and using skills as they work toward the goals identified in their treatment plan.      Nessa Velázquez, St. Joseph HospitalSW  December 4, 2023

## 2023-12-05 ENCOUNTER — HOSPITAL ENCOUNTER (OUTPATIENT)
Dept: BEHAVIORAL HEALTH | Facility: CLINIC | Age: 23
Discharge: HOME | End: 2023-12-05
Attending: PSYCHIATRY & NEUROLOGY
Payer: COMMERCIAL

## 2023-12-05 PROCEDURE — 90853 GROUP PSYCHOTHERAPY: CPT

## 2023-12-05 NOTE — GROUP NOTE
Psychoeducation Group Note    PATIENT'S NAME: Julita Fernandez  MRN:   [id number]  :   2000  ACCT. NUMBER: [id number]  DATE OF SERVICE: 23  START TIME: 11:00 AM  END TIME: 11:50 AM  FACILITATOR: Kristina Upton LGSW  TOPIC: MH Wellness Group: Mental Health Maintenance  [facility] Adult Mental Health Outpatient Programs  TRACK: IOP 1    NUMBER OF PARTICIPANTS: 7    Summary of Group / Topics Discussed:  Mental Health Maintenance:  Stigma: In this group patients explored stigma surrounding a mental health diagnosis.  The group discussed the way stigma impacts their own life, and discussed strategies to reduce. The relationship between physical and mental health were also explored in the context of healthcare access, treatment, and support.    Patient Session Goals / Objectives:  Patients identified the importance of practicing emotional hygiene  Patients identified ways to decrease the  impact of stigma in their own life      Patient Participation / Response:  Fully participated with the group by sharing personal reflections / insights and openly received / provided feedback with other participants.    Demonstrated understanding of topics discussed through group discussion and participation, Identified / Expressed personal readiness to practice skills, and Verbalized understanding of mental health maintenance topic    Treatment Plan:  Patient has a current master individualized treatment plan.  See Epic treatment plan for more information.    BARB Stone

## 2023-12-05 NOTE — GROUP NOTE
"Process Group Note    PATIENT'S NAME: Julita Fernandez  MRN:   [id number]  :   2000  ACCT. NUMBER: [id number]  DATE OF SERVICE: 23  START TIME:  9:00 AM  END TIME:  9:50 AM  FACILITATOR: Nessa Curry LICSW  TOPIC:  Process Group    Diagnoses:  296.32 (F33.1) Major Depressive Disorder, Recurrent Episode, Moderate _ and With melancholic features  300.02 (F41.1) Generalized Anxiety Disorder.  4. Other Diagnoses that is relevant to services:   300.01 (F41.0) Panic Disorder  309.81 (F43.10) Posttraumatic Stress Disorder (includes Posttraumatic Stress Disorder for Children 6 Years and Younger)  With dissociative symptoms.  5. Provisional Diagnosis:  Substance-Related & Addictive Disorders Alcohol Use Disorder   305.00 (F10.10) Mild In a controlled environment  305.20 (F12.10) Cannabis Use Disorder Mild  In a controlled environment.    [facility]  TRACK: IOP 1    NUMBER OF PARTICIPANTS: 7        Data:    Session content: At the start of this group, patients were invited to check in by identifying themselves, describing their current emotional status, and identifying issues to address in this group.   Area(s) of treatment focus addressed in this session included Symptom Management and Personal Safety.  Pt reports feeling \"really good\" today. She was able to sleep well in her new apartment. Pt feels positive about getting settled in her apt and makes plans to continue to move in today. She feels proud of setting boundaries with her boyfriend and is looking forward to getting together with a friend today. She is looking forward to going to the [employer] to get a cat too. Pt is grateful for her own space and denies safety concerns.    Therapeutic Interventions/Treatment Strategies:  Psychotherapist offered support, feedback and validation and reinforced use of skills. Treatment modalities used include Cognitive Behavioral Therapy. Interventions include " "Behavioral Activation: Explored how behaviors effect mood and interact with thoughts and feelings, Cognitive Restructuring:  Assisted patient in identifying new neutral/positive core beliefs, Coping Skills: Discussed use of self-soothe skills to decrease distress in the body, Assisted patient in identifying 1-2 healthy distraction skills to reduce overall distress, Discussed how the use of intentional \"in the moment\" actions can help reduce distress, Reviewed patients current calming practices and discussed a more formal way of practicing and accessing skills, and Assisted patient in understanding the purpose of planning / creating / participating / sharing in positive experiences, and Relationship Skills: Encouraged development and maintenance  of healthy boundaries.    Assessment:    Patient response:   Patient responded to session by accepting feedback, giving feedback, listening, focusing on goals, being attentive, and accepting support    Possible barriers to participation / learning include: and no barriers identified    Health Issues:   None reported       Substance Use Review:   Substance Use: alcohol  and cannabis .  and Last use: medical cannabis and 1 shot last night. She feels ok with this use    Mental Status/Behavioral Observations  Appearance:   Appropriate   Eye Contact:   Good   Psychomotor Behavior: Normal   Attitude:   Cooperative   Orientation:   All  Speech   Rate / Production: Normal    Volume:  Normal   Mood:    Anxious  Normal  Affect:    Appropriate   Thought Content:   Clear  Thought Form:  Coherent  Logical     Insight:    Good     Plan:   Safety Plan: No current safety concerns identified.  Recommended that patient call 911 or go to the local ED should there be a change in any of these risk factors.   Barriers to treatment: None identified  Patient Contracts (see media tab):  None  Substance Use: Not addressed in session   Continue or Discharge: Patient will continue in Adult Day " Treatment (ADT)  as planned. Patient is likely to benefit from learning and using skills as they work toward the goals identified in their treatment plan.      Nessa Velázquez, [facility]  December 5, 2023

## 2023-12-05 NOTE — GROUP NOTE
Psychotherapy Group Note    PATIENT'S NAME: Julita Fernandez  MRN:   [id number]  :   2000  ACCT. NUMBER: [id number]  DATE OF SERVICE: 23  START TIME: 10:00 AM  END TIME: 10:50 AM  FACILITATOR: Nessa Curry LICSW  TOPIC: MH EBP Group: Coping Skills  [facility] Outpatient [facility]  TRACK: IOP 1    NUMBER OF PARTICIPANTS: 7    Summary of Group / Topics Discussed:  Coping Skills: Distraction: Patients learned to mindfully use distraction as a way to decrease heightened stress in the moment.  Patients will identified situations that necessitate healthy distraction strategies.  They explored ways to manage physical symptoms of distress using distraction. The group began to distinguish when this can be useful in their lives or when other strategies would be more relevant or helpful.    Patient Session Goals / Objectives:  Understand the purpose and benefits of using healthy distraction to decrease distress.  Process what happens in the body when using distraction strategies.  Demonstrate understanding of when to use distraction strategies.  Explore patient s current distraction activities, and how to take a more intentional approach to the use of distraction.  Identify and problem solve barriers to applying distraction strategies.  Choose 1-2 healthy distraction strategies to apply during times of distress.      Patient Participation / Response:  Fully participated with the group by sharing personal reflections / insights and openly received / provided feedback with other participants.    Demonstrated understanding of topics discussed through group discussion and participation, Expressed understanding of the relevance / importance of coping skills at distressing times in life, and Demonstrated knowledge of when to consider using a variety of coping skills in daily life    Treatment Plan:  Patient has a current master individualized treatment plan.  See Epic treatment plan for  more information.    Nessa Velázquez, LICSW

## 2023-12-06 ENCOUNTER — HOSPITAL ENCOUNTER (OUTPATIENT)
Dept: BEHAVIORAL HEALTH | Facility: CLINIC | Age: 23
Discharge: HOME | End: 2023-12-06
Attending: PSYCHIATRY & NEUROLOGY
Payer: COMMERCIAL

## 2023-12-06 PROCEDURE — 90853 GROUP PSYCHOTHERAPY: CPT

## 2023-12-06 NOTE — GROUP NOTE
"Process Group Note    PATIENT'S NAME: Julita Fernandez  MRN:   [id number]  :   2000  ACCT. NUMBER: [id number]  DATE OF SERVICE: 23  START TIME:  9:00 AM  END TIME:  9:50 AM  FACILITATOR: Nessa Curry LICSW  TOPIC:  Process Group    Diagnoses:  296.32 (F33.1) Major Depressive Disorder, Recurrent Episode, Moderate _ and With melancholic features  300.02 (F41.1) Generalized Anxiety Disorder.  4. Other Diagnoses that is relevant to services:   300.01 (F41.0) Panic Disorder  309.81 (F43.10) Posttraumatic Stress Disorder (includes Posttraumatic Stress Disorder for Children 6 Years and Younger)  With dissociative symptoms.  5. Provisional Diagnosis:  Substance-Related & Addictive Disorders Alcohol Use Disorder   305.00 (F10.10) Mild In a controlled environment  305.20 (F12.10) Cannabis Use Disorder Mild  In a controlled environment.    [facility] Outpatient [facility]  TRACK: IOP 1    NUMBER OF PARTICIPANTS: 5        Data:    Session content: At the start of this group, patients were invited to check in by identifying themselves, describing their current emotional status, and identifying issues to address in this group.   Area(s) of treatment focus addressed in this session included Symptom Management and Personal Safety.  Pt reports feeling \"excited and really good\" due to adopting a cat yesterday. Pt feels that the cat is therapeutic for her. She identifies coping skills as: self-compassion, self-soothing and radical acceptance. She is grateful for her apartment. Pt is proud of not drinking last night and sets a goal to avoid alcohol tonight. She denies safety concerns.    Therapeutic Interventions/Treatment Strategies:  Psychotherapist offered support, feedback and validation and reinforced use of skills. Treatment modalities used include Cognitive Behavioral Therapy. Interventions include Coping Skills: Discussed use of self-soothe skills to decrease distress in the body, " "Discussed how the use of intentional \"in the moment\" actions can help reduce distress, and Assisted patient in understanding the purpose of planning / creating / participating / sharing in positive experiences.    Assessment:    Patient response:   Patient responded to session by accepting feedback, giving feedback, listening, focusing on goals, being attentive, and accepting support    Possible barriers to participation / learning include: and no barriers identified    Health Issues:   Yes: Weight / dietary issues, Associated Psychological Distress       Substance Use Review:   Substance Use: cannabis .  and Last use: medical cannabis last night    Mental Status/Behavioral Observations  Appearance:   Appropriate   Eye Contact:   Good   Psychomotor Behavior: Normal   Attitude:   Cooperative   Orientation:   All  Speech   Rate / Production: Normal    Volume:  Normal   Mood:    Normal  Affect:    Appropriate   Thought Content:   Clear  Thought Form:  Coherent  Logical     Insight:    Good     Plan:   Safety Plan: No current safety concerns identified.  Recommended that patient call 911 or go to the local ED should there be a change in any of these risk factors.   Barriers to treatment: None identified  Patient Contracts (see media tab):  None  Substance Use: Not addressed in session   Continue or Discharge: Patient will continue in Adult Day Treatment (ADT)  as planned. Patient is likely to benefit from learning and using skills as they work toward the goals identified in their treatment plan.      Nessa Velázquez, LICSW  December 6, 2023  "

## 2023-12-06 NOTE — GROUP NOTE
Psychotherapy Group Note    PATIENT'S NAME: Julita Fernandez  MRN:   [id number]  :   2000  ACCT. NUMBER: [id number]  DATE OF SERVICE: 23  START TIME: 10:00 AM  END TIME: 10:50 AM  FACILITATOR: Nessa Curry LICSW  TOPIC: MH EBP Group: Coping Skills  [facility]  TRACK: IOP 1    NUMBER OF PARTICIPANTS: 5    Summary of Group / Topics Discussed:  Coping Skills: Additional Coping Skills/Looking Back:  Patients discussed the mental health benefits of acknowledging accomplishments.  Reviewed the benefits of applying the aforementioned coping strategies.  Patients explored how these strategies might be applied to daily stressors or distressing situations.    Patient Session Goals / Objectives:  Understand the purpose and benefits of applying Looking Back coping strategies  Identify personal accomplishments  Address barriers to utilizing coping skills when in distress.      Patient Participation / Response:  Fully participated with the group by sharing personal reflections / insights and openly received / provided feedback with other participants.    Demonstrated understanding of topics discussed through group discussion and participation and Expressed understanding of the relevance / importance of coping skills at distressing times in life    Treatment Plan:  Patient has a current master individualized treatment plan.  See Epic treatment plan for more information.    JEREMY Soto

## 2023-12-06 NOTE — GROUP NOTE
Psychoeducation Group Note    PATIENT'S NAME: Julita Fernandez  MRN:   [id number]  :   2000  ACCT. NUMBER: [id number]  DATE OF SERVICE: 23  START TIME: 11:00 AM  END TIME: 11:50 AM  FACILITATOR: Kristina Upton LGSW  TOPIC: MH Life Skills Group: Resiliency Development  [facility] Outpatient [facility]  TRACK: IOP 1    NUMBER OF PARTICIPANTS: 5    Summary of Group / Topics Discussed:  Resiliency Development:  Coping Skills: Aftercare Coping Cards: Patients were taught how to begin to develop a relapse management kit for both mental and substance use/abuse by creating individualized coping cards.    Patient Session Goals / Objectives:  Identified individualized coping skills they can use for effectively managing both mental health and substance abuse/abuse symptoms   Improved awareness of different types of coping skills and how to personalize them to their unique situation and circumstances    Established a plan for practice of these skills in their own environments  Practiced and reflected on how to generalize taught skills to their everyday life      Patient Participation / Response:  Fully participated with the group by sharing personal reflections / insights and openly received / provided feedback with other participants.    Demonstrated understanding of content through active participation  and Verbalized understanding of content    Treatment Plan:  Patient has a current master individualized treatment plan.  See Epic treatment plan for more information.    BARB Stone

## 2023-12-07 ENCOUNTER — HOSPITAL ENCOUNTER (OUTPATIENT)
Dept: BEHAVIORAL HEALTH | Facility: CLINIC | Age: 23
Discharge: HOME | End: 2023-12-07
Attending: PSYCHIATRY & NEUROLOGY
Payer: COMMERCIAL

## 2023-12-07 PROCEDURE — 90853 GROUP PSYCHOTHERAPY: CPT

## 2023-12-07 NOTE — GROUP NOTE
Process Group Note    PATIENT'S NAME: Julita Fernandez  MRN:   [id number]  :   2000  ACCT. NUMBER: [id number]  DATE OF SERVICE: 23  START TIME:  9:00 AM  END TIME:  9:50 AM  FACILITATOR: Nessa Curry LICSW  TOPIC:  Process Group    Diagnoses:  296.32 (F33.1) Major Depressive Disorder, Recurrent Episode, Moderate _ and With melancholic features  300.02 (F41.1) Generalized Anxiety Disorder.  4. Other Diagnoses that is relevant to services:   300.01 (F41.0) Panic Disorder  309.81 (F43.10) Posttraumatic Stress Disorder (includes Posttraumatic Stress Disorder for Children 6 Years and Younger)  With dissociative symptoms.  5. Provisional Diagnosis:  Substance-Related & Addictive Disorders Alcohol Use Disorder   305.00 (F10.10) Mild In a controlled environment  305.20 (F12.10) Cannabis Use Disorder Mild  In a controlled environment.    [facility]  TRACK: IOP 1    NUMBER OF PARTICIPANTS: 3        Data:    Session content: At the start of this group, patients were invited to check in by identifying themselves, describing their current emotional status, and identifying issues to address in this group.   Area(s) of treatment focus addressed in this session included Symptom Management and Personal Safety.  Pt reports feeling good and loving her new cat. Pt identifies the cat as her primary coping skill. She reports conflict with her boyfriend. He does not always respect her boundaries including refusing to leave her apartment when asked and taking her car to places outside of their agreement. Pt sets a goal to set boundaries with him. Pt was tangential, talkative and hard to re-focus. Denies chemical use before group and denies  safety concerns.    Therapeutic Interventions/Treatment Strategies:  Psychotherapist offered support, feedback and validation, set limits, provided redirection, and reinforced use of skills. Treatment modalities used include Cognitive  Behavioral Therapy. Interventions include Coping Skills: Discussed use of self-soothe skills to decrease distress in the body and Relationship Skills: Assisted patients in implementing more effective communication skills in their relationships and Encouraged development and maintenance  of healthy boundaries.    Assessment:    Patient response:   Patient responded to session by accepting feedback, giving feedback, listening, focusing on goals, being attentive, and accepting support    Possible barriers to participation / learning include: and no barriers identified    Health Issues:   Yes: Weight / dietary issues, Associated Psychological Distress       Substance Use Review:   Substance Use: alcohol  and cannabis .  and Last use: medical cannabis daily. 1 drink last night, which she is proud of. Pt sets a goal to only have one drink tonight or ask for resources    Mental Status/Behavioral Observations  Appearance:   Disheveled   Eye Contact:   Fair   Psychomotor Behavior: Restless   Attitude:   Cooperative   Orientation:   All  Speech   Rate / Production: Pressured  Talkative   Volume:  Normal   Mood:    Anxious   Affect:    Appropriate   Thought Content:   Rumination  Thought Form:  Coherent  Tangential     Insight:    Fair     Plan:   Safety Plan: No current safety concerns identified.  Recommended that patient call 911 or go to the local ED should there be a change in any of these risk factors.   Barriers to treatment: None identified  Patient Contracts (see media tab):  None  Substance Use: Provided encouragement towards sobriety   Continue or Discharge: Patient will continue in Adult Day Treatment (ADT)  as planned. Patient is likely to benefit from learning and using skills as they work toward the goals identified in their treatment plan.      Nessa Velázquez, [facility]  December 7, 2023

## 2023-12-07 NOTE — GROUP NOTE
Psychotherapy Group Note    PATIENT'S NAME: Julita Fernandez  MRN:   [id number]  :   2000  ACCT. NUMBER: [id number]  DATE OF SERVICE: 23  START TIME: 10:00 AM  END TIME: 10:50 AM  FACILITATOR: Nessa Curry LICSW  TOPIC: MH EBP Group: Coping Skills  [facility]  TRACK: IOP 1    NUMBER OF PATIENTS: 3  Summary of Group / Topics Discussed:  Coping Skills: Additional Coping Skills/ Looking Backwards part 2:  Patients discussed the mental health benefits of looking at past accomplishments and areas of gratitude.  Reviewed the benefits of applying the aforementioned coping strategies.  Patients explored how these strategies might be applied to daily stressors or distressing situations.    Patient Session Goals / Objectives:  Understand the purpose and benefits of applying Looking Back coping strategies  Identified accomplishments  Address barriers to utilizing coping skills when in distress.      Patient Participation / Response:  Fully participated with the group by sharing personal reflections / insights and openly received / provided feedback with other participants.    Demonstrated understanding of topics discussed through group discussion and participation, Expressed understanding of the relevance / importance of coping skills at distressing times in life, and Demonstrated knowledge of when to consider using a variety of coping skills in daily life    Treatment Plan:  Patient has a current master individualized treatment plan.  See Epic treatment plan for more information.    JEREMY Soto

## 2023-12-07 NOTE — GROUP NOTE
Psychotherapy Group Note    PATIENT'S NAME: Julita Fernandez  MRN:   [id number]  :   2000  ACCT. NUMBER: [id number]  DATE OF SERVICE: 23  START TIME: 11:00 AM  END TIME: 11:50 AM  FACILITATOR: Kristina Upton LGSW  TOPIC:  EBP Group: Behavioral Activation  [facility]  TRACK: IOP 1    NUMBER OF PARTICIPANTS: 3    Summary of Group / Topics Discussed:  Behavioral Activation: Building Healthy Habits: Patients explored existing habits and how specific behaviors impact thoughts and feelings.  The group explored the effect of negative and positive activities on mood states and thought patterns.  Patients identified activities that help to improve mood and thinking patterns, and developed a plan to implement healthy behaviors.      Patient Session Goals / Objectives:  Identify impact of current habitual behaviors on thoughts and mood  Discuss tips & strategies to form and maintain helpful habits. (Building New Habits - Therapist Aid)  Identify 2-3 behavioral changes that could have a positive impact on thoughts and mood  Prepare to make desired behavioral change: Create a habit plan (Therapist Aid)      Patient Participation / Response:  Fully participated with the group by sharing personal reflections / insights and openly received / provided feedback with other participants.    Demonstrated understanding of topics discussed through group discussion and participation, Expressed understanding of the relationship between behaviors, thoughts, and feelings, Shared experiences and challenges with making behavioral changes, Identified barriers to change, Identified / Expressed personal readiness to make behavioral change, Identified ways to increase goal directed activities, and Shared their progress tracking mood and activity    Treatment Plan:  Patient has a current master individualized treatment plan.  See Epic treatment plan for more information.    Kristian Upton  OLGASW

## 2023-12-11 ENCOUNTER — TELEPHONE (OUTPATIENT)
Dept: BEHAVIORAL HEALTH | Facility: CLINIC | Age: 23
End: 2023-12-11
Payer: COMMERCIAL

## 2023-12-12 ENCOUNTER — TELEPHONE (OUTPATIENT)
Dept: BEHAVIORAL HEALTH | Facility: CLINIC | Age: 23
End: 2023-12-12
Payer: COMMERCIAL

## 2023-12-12 NOTE — TELEPHONE ENCOUNTER
"Writer called patient about her IOP absence today and yesterday.  She reported drinking \"too much\" over the weekend triggering bad withdrawal symptoms on Monday.  She reports calling her mom who took her to the hospital on Monday.  She is still at the hospital.  She reports that her mom is coordinating a rehabilitation facility ([facility]) upon discharge from hospital.  She will likely discharge from [facility] but is holding off until she knows more. She will call treatment team back with more details.    BARB Stone on 12/12/2023 at 1:12 PM    "

## 2023-12-15 NOTE — PROGRESS NOTES
Adult Mental Health Intensive Outpatient Discharge Summary/Instructions      Patient: Julita Fernandez MRN: [id number]  : 2000 Age: 23 Sex: Female    Admission Date: 10/10/2023  Discharge Date: 2023  Patient is discharging early to seek treatment for alcohol use at [facility].  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  4. Other Diagnoses that is relevant to services:   300.01 (F41.0) Panic Disorder  309.81 (F43.10) Posttraumatic Stress Disorder (includes Posttraumatic Stress Disorder for Children 6 Years and Younger)  With dissociative symptoms.  5. Provisional Diagnosis:  Substance-Related & Addictive Disorders Alcohol Use Disorder   305.00 (F10.10) Mild In a controlled environment  305.20 (F12.10) Cannabis Use Disorder Mild  In a controlled environment.    Focus of Treatment / Progress    Personal Safety: Patient denied safety concerns at time of most recent check in.     * Follow your safety plan     * Call crisis lines as needed:    [facility] [phone] [facility] [phone]  [facility] [phone] Crisis Connection [phone]  [facility] [phone] [facility] COPE [phone]  [facility] [phone] National Suicide Prevention Text or call 988   [facility] [phone] Suicide Prevention [phone]  [facility] [phone]    Managing symptoms of:  Depression, Anxiety, Alcohol Use    Community support/health:  Boyfriend, Grandma  PEER SUPPORT PROGRAMS:     [place] Mental Health Warmline  https://mentalhealthmn.org/support/[place]-warmline/   The [place] Warmline is safe, anonymous, confidential, free, and here to help you when you need it.  Open Monday-Saturday, 12 PM to 10 PM   [phone]; Toll Free 855-WARMLINE or [phone] or text  Support  to 33444     The Peer Support Connection Warmline of  "[place]  The Peer Support Connection [facility] is a safe and free way for individuals to receive confidential and anonymous one on one peer support from trained Peers, Certified Peer Support Specialists, and Recovery Coaches.  https://mnwitRitani.org/michael   Available from 5pm - 9am  (7 days a week/365 days a year)  [phone]     Managing Symptoms and Preventing Relapse    * Go to all of your appointments    * Take all medications as directed.      * Carry a current list if medication with you    * Do not use illicit (street) drugs.  Avoid alcohol    * Report these symptoms to your care team. These are early signs of relapse:   Thoughts of suicide   Losing more sleep   Increased confusion   Mood getting worse   Other:  Increased isolation    *Use these skills daily:  Practice Mindfulness, Reality Checks, Opposite to Emotion, Identify thought distortions & feelings, distractions, express self, keep appointments, take medications daily, journal, use gratitude, self-compassion, Wise Mind, Use of the 5 Senses, grounding, Talk to someone you trust at least one time weekly, set boundaries and say \"no\", be assertive, act opposite of negative feelings, accept challenges with a positive attitude, exercise at least three times per week for 30 minutes,  get enough sleep, eat healthy foods, get into a good routine    Copy of summary sent to: client via [employer]    Follow up with psychiatrist / main caregiver: Carolyn [facility]    Next visit: Patient will schedule    Follow up with your therapist: Farideh Jeong and Rodney Kong   Next visit: Patient will schedule    Go to group therapy and / or support groups at:     AA Support Groups https://www.aa.org/find-aa  Consider [facility] support groups.   Listings for the free support groups are at https://namimn.org/support/natali-[place]-support-groups/ (178) 404-2448   free support groups for numerous group therapy " topics & issues including: individual, family, couples, LGBTQ, Young Adults, parenting, anxiety, grief and loss, depression, and  others  Virtual Peer Support Network by [facility]: Virtual Peer Support Network (VPSN) -- [facility]  Mental Health Advocacy (mnwitw.org) Each meeting (via Zoom) will provide tools and support to help you lead a healthier and happier life. We offer sessions that focus on specific topics such as recovery support, senior living, LGBTQ+ support and more. See our calendar here. Available from 10am - 4pm  (7 Days a Week)  To find more support groups: https://mentalhealthmn.org/what-we-do/peer-support/support-groups/  To find more community based mental health resources: https://mentalhealthmn.org/community-resources/     See your medical doctor about:  Annual physical exams    Other:  Your team appreciates the opportunity to work with you and wishes you the best of luck.  Please call [phone] with any further questions.    Client Signature:____Julita Fernandez (unable to sign due to not being on site at time of discharge)  Date___12/18/2023    Staff Signature:  ____  YANI Stone, ELINOR Clinical   Date ___12/18/2023   time  ______1200 pm    Main office [facility]:  [phone]

## 2023-12-18 ENCOUNTER — TELEPHONE (OUTPATIENT)
Dept: BEHAVIORAL HEALTH | Facility: CLINIC | Age: 23
End: 2023-12-18
Payer: COMMERCIAL

## 2023-12-18 ASSESSMENT — COLUMBIA-SUICIDE SEVERITY RATING SCALE - C-SSRS
1. SINCE LAST CONTACT, HAVE YOU WISHED YOU WERE DEAD OR WISHED YOU COULD GO TO SLEEP AND NOT WAKE UP?: YES
BASED ON RESPONSES TO C-SSRS QS 1-6, WHAT IS THE PATIENT'S OVERALL RISK RATING FOR SUICIDE: LOW RISK
2. HAVE YOU ACTUALLY HAD ANY THOUGHTS OF KILLING YOURSELF?: YES

## 2023-12-18 NOTE — TELEPHONE ENCOUNTER
----- Message from BARB Stone sent at 12/18/2023 12:10 PM CST -----  Regarding: Pt. Discharging from IOP 1  Jane is discharging from IOP 1 (M-TH 9-Noon) effective today 12/18/2023.  Please remove any future appointments from the [facility].    All the best,    BARB Stone

## 2024-02-07 DIAGNOSIS — K21.01 GASTROESOPHAGEAL REFLUX DISEASE WITH ESOPHAGITIS AND HEMORRHAGE: ICD-10-CM

## 2024-02-07 DIAGNOSIS — K22.6 MALLORY-WEISS TEAR: ICD-10-CM

## 2024-02-07 RX ORDER — PANTOPRAZOLE SODIUM 40 MG/1
40 TABLET, DELAYED RELEASE ORAL DAILY
Qty: 90 TABLET | Refills: 1 | Status: SHIPPED | OUTPATIENT
Start: 2024-02-07

## 2024-04-15 ENCOUNTER — OFFICE VISIT (OUTPATIENT)
Dept: URGENT CARE | Facility: URGENT CARE | Age: 24
End: 2024-04-15
Payer: COMMERCIAL

## 2024-04-15 VITALS
SYSTOLIC BLOOD PRESSURE: 125 MMHG | TEMPERATURE: 97.4 F | RESPIRATION RATE: 15 BRPM | DIASTOLIC BLOOD PRESSURE: 85 MMHG | HEIGHT: 64 IN | OXYGEN SATURATION: 100 % | WEIGHT: 114 LBS | BODY MASS INDEX: 19.46 KG/M2 | HEART RATE: 87 BPM

## 2024-04-15 DIAGNOSIS — N76.0 BV (BACTERIAL VAGINOSIS): ICD-10-CM

## 2024-04-15 DIAGNOSIS — B96.89 BV (BACTERIAL VAGINOSIS): ICD-10-CM

## 2024-04-15 DIAGNOSIS — N89.8 VAGINAL ODOR: Primary | ICD-10-CM

## 2024-04-15 DIAGNOSIS — B37.31 YEAST VAGINITIS: ICD-10-CM

## 2024-04-15 LAB
ALBUMIN UR-MCNC: NEGATIVE MG/DL
APPEARANCE UR: CLEAR
BILIRUB UR QL STRIP: NEGATIVE
CLUE CELLS: PRESENT
COLOR UR AUTO: YELLOW
GLUCOSE UR STRIP-MCNC: NEGATIVE MG/DL
HGB UR QL STRIP: NEGATIVE
KETONES UR STRIP-MCNC: NEGATIVE MG/DL
LEUKOCYTE ESTERASE UR QL STRIP: NEGATIVE
NITRATE UR QL: NEGATIVE
PH UR STRIP: 7.5 [PH] (ref 5–7)
SP GR UR STRIP: 1.02 (ref 1–1.03)
TRICHOMONAS, WET PREP: ABNORMAL
UROBILINOGEN UR STRIP-ACNC: 0.2 E.U./DL
WBC'S/HIGH POWER FIELD, WET PREP: ABNORMAL
YEAST, WET PREP: PRESENT

## 2024-04-15 PROCEDURE — 99213 OFFICE O/P EST LOW 20 MIN: CPT | Performed by: PHYSICIAN ASSISTANT

## 2024-04-15 PROCEDURE — 81003 URINALYSIS AUTO W/O SCOPE: CPT | Performed by: PHYSICIAN ASSISTANT

## 2024-04-15 PROCEDURE — 87210 SMEAR WET MOUNT SALINE/INK: CPT | Performed by: PHYSICIAN ASSISTANT

## 2024-04-15 PROCEDURE — 87591 N.GONORRHOEAE DNA AMP PROB: CPT | Performed by: PHYSICIAN ASSISTANT

## 2024-04-15 PROCEDURE — 87491 CHLMYD TRACH DNA AMP PROBE: CPT | Performed by: PHYSICIAN ASSISTANT

## 2024-04-15 RX ORDER — TRAZODONE HYDROCHLORIDE 50 MG/1
TABLET, FILM COATED ORAL
COMMUNITY
Start: 2024-02-07

## 2024-04-15 RX ORDER — METRONIDAZOLE 7.5 MG/G
1 GEL VAGINAL AT BEDTIME
Qty: 70 G | Refills: 0 | Status: SHIPPED | OUTPATIENT
Start: 2024-04-15 | End: 2024-04-20

## 2024-04-15 RX ORDER — FLUCONAZOLE 150 MG/1
150 TABLET ORAL ONCE
Qty: 1 TABLET | Refills: 0 | Status: SHIPPED | OUTPATIENT
Start: 2024-04-15 | End: 2024-04-15

## 2024-04-15 NOTE — PROGRESS NOTES
Assessment & Plan     1. Vaginal odor    - Wet prep - Clinic Collect  - NEISSERIA GONORRHOEA PCR; Future  - CHLAMYDIA TRACHOMATIS PCR; Future  - UA Macroscopic with reflex to Microscopic and Culture - Clinic Collect  - CHLAMYDIA TRACHOMATIS PCR  - NEISSERIA GONORRHOEA PCR    2. Yeast vaginitis  Treatment as below  - fluconazole (DIFLUCAN) 150 MG tablet; Take 1 tablet (150 mg) by mouth once for 1 dose  Dispense: 1 tablet; Refill: 0    3. BV (bacterial vaginosis)  Treatment as below  - metroNIDAZOLE (METROGEL) 0.75 % vaginal gel; Place 1 applicator (5 g) vaginally at bedtime for 5 days  Dispense: 70 g; Refill: 0      Patient with both yeast and clue cells noted on wet prep.  Treatment medication as above.  GC test is pending, she will be contacted if positive.  Avoid intercourse until treatment is complete.    Diagnosis and treatment plan was reviewed with patient and/or family.   We went over any labs or imaging. Discussed worsening symptoms or little to no relief despite treatment plan to follow-up with PCP or return to clinic.  Patient verbalizes understanding. All questions were addressed and answered.     Karen Sim PA-C  [facility] [place]    CHIEF COMPLAINT:   Chief Complaint   Patient presents with    Vaginal Problem     X1 week of discharge and odor     Urgent Care     Subjective     Julita is a 23 year old female who presents to clinic today for evaluation of vaginal odor and discharge. Symptoms have been present for about one week. Denies having fever, chills, flank pain, nausea, vomiting, hematuria or weakness. No dysuria. She has had new sexual partners.     Past Medical History:   Diagnosis Date    Anxiety     Gastroesophageal reflux disease      No past surgical history on file.  Social History     Tobacco Use    Smoking status: Every Day     Types: Cigarettes    Smokeless tobacco: Never   Substance Use Topics    Alcohol use: Not Currently     Current Outpatient  "Medications   Medication Sig Dispense Refill    fluconazole (DIFLUCAN) 150 MG tablet Take 1 tablet (150 mg) by mouth once for 1 dose 1 tablet 0    levonorgestrel (MIRENA) 20 mcg/24 hours (5 yrs) 52 mg IUD [LEVONORGESTREL (MIRENA) 20 MCG/24 HOURS (5 YRS) 52 MG IUD] by Intrauterine route once for 1 dose. 1 each 0    metroNIDAZOLE (METROGEL) 0.75 % vaginal gel Place 1 applicator (5 g) vaginally at bedtime for 5 days 70 g 0    sertraline (ZOLOFT) 100 MG tablet Take 1 tablet (100 mg) by mouth daily 90 tablet 1    traZODone (DESYREL) 50 MG tablet       cephALEXin (KEFLEX) 500 MG capsule 500 mg (Patient not taking: Reported on 4/15/2024)      gabapentin (NEURONTIN) 300 MG capsule Take 1 capsule (300 mg) by mouth 3 times daily as needed (severe anxiety) (Patient not taking: Reported on 4/15/2024) 60 capsule 3    melatonin 5 MG CAPS Take 5 mg by mouth nightly as needed (Sleep) (Patient not taking: Reported on 4/15/2024)      ondansetron (ZOFRAN ODT) 4 MG ODT tab Take 1-2 tablets (4-8 mg) by mouth every 8 hours as needed for nausea (Patient not taking: Reported on 4/15/2024) 20 tablet 0    ondansetron (ZOFRAN ODT) 4 MG ODT tab Take 1 tablet (4 mg) by mouth every 8 hours as needed for nausea (Patient not taking: Reported on 4/15/2024) 10 tablet 0    pantoprazole (PROTONIX) 40 MG EC tablet TAKE 1 TABLET BY MOUTH EVERY DAY (Patient not taking: Reported on 4/15/2024) 90 tablet 1     No current facility-administered medications for this visit.     Allergies   Allergen Reactions    Soy [Isoflavones (Soy)] Other (See Comments)     Facial eczema        10 point ROS of systems were all negative except for pertinent positives noted in my HPI.      Exam:   /85   Pulse 87   Temp 97.4  F (36.3  C) (Temporal)   Resp 15   Ht 1.626 m (5' 4\")   Wt 51.7 kg (114 lb)   SpO2 100%   BMI 19.57 kg/m    Constitutional: healthy, alert and no distress  ENT: MMM  Gastrointestinal: soft and nontender  Back: No CVA tenderness B/L  Skin: no " jesus    Results for orders placed or performed in visit on 04/15/24   UA Macroscopic with reflex to Microscopic and Culture - Clinic Collect     Status: Abnormal    Specimen: Urine, Midstream   Result Value Ref Range    Color Urine Yellow Colorless, Straw, Light Yellow, Yellow    Appearance Urine Clear Clear    Glucose Urine Negative Negative mg/dL    Bilirubin Urine Negative Negative    Ketones Urine Negative Negative mg/dL    Specific Gravity Urine 1.020 1.003 - 1.035    Blood Urine Negative Negative    pH Urine 7.5 (H) 5.0 - 7.0    Protein Albumin Urine Negative Negative mg/dL    Urobilinogen Urine 0.2 0.2, 1.0 E.U./dL    Nitrite Urine Negative Negative    Leukocyte Esterase Urine Negative Negative    Narrative    Microscopic not indicated   Wet prep - Clinic Collect     Status: Abnormal    Specimen: Vagina; Swab   Result Value Ref Range    Trichomonas Absent Absent    Yeast Present (A) Absent    Clue Cells Present (A) Absent    WBCs/high power field None None

## 2024-04-16 LAB
C TRACH DNA SPEC QL NAA+PROBE: NEGATIVE
N GONORRHOEA DNA SPEC QL NAA+PROBE: NEGATIVE

## 2024-05-14 ENCOUNTER — PATIENT OUTREACH (OUTPATIENT)
Dept: CARE COORDINATION | Facility: CLINIC | Age: 24
End: 2024-05-14
Payer: COMMERCIAL

## 2024-05-15 ENCOUNTER — HOSPITAL ENCOUNTER (EMERGENCY)
Facility: HOSPITAL | Age: 24
Discharge: HOME | End: 2024-05-15
Attending: EMERGENCY MEDICINE | Admitting: EMERGENCY MEDICINE
Payer: COMMERCIAL

## 2024-05-15 VITALS
OXYGEN SATURATION: 100 % | WEIGHT: 115 LBS | TEMPERATURE: 98.3 F | RESPIRATION RATE: 20 BRPM | HEART RATE: 97 BPM | HEIGHT: 64 IN | BODY MASS INDEX: 19.63 KG/M2 | SYSTOLIC BLOOD PRESSURE: 121 MMHG | DIASTOLIC BLOOD PRESSURE: 78 MMHG

## 2024-05-15 DIAGNOSIS — F41.9 ANXIETY: ICD-10-CM

## 2024-05-15 DIAGNOSIS — R11.2 NAUSEA AND VOMITING, UNSPECIFIED VOMITING TYPE: ICD-10-CM

## 2024-05-15 DIAGNOSIS — F10.11 HISTORY OF ALCOHOL ABUSE: ICD-10-CM

## 2024-05-15 DIAGNOSIS — F41.1 GENERALIZED ANXIETY DISORDER: ICD-10-CM

## 2024-05-15 LAB
ALBUMIN SERPL BCG-MCNC: 4.6 G/DL (ref 3.5–5.2)
ALBUMIN UR-MCNC: 70 MG/DL
ALP SERPL-CCNC: 88 U/L (ref 40–150)
ALT SERPL W P-5'-P-CCNC: 38 U/L (ref 0–50)
ANION GAP SERPL CALCULATED.3IONS-SCNC: 14 MMOL/L (ref 7–15)
APPEARANCE UR: ABNORMAL
AST SERPL W P-5'-P-CCNC: 41 U/L (ref 0–45)
BASOPHILS # BLD AUTO: 0.1 10E3/UL (ref 0–0.2)
BASOPHILS NFR BLD AUTO: 0 %
BILIRUB SERPL-MCNC: 0.7 MG/DL
BILIRUB UR QL STRIP: NEGATIVE
BUN SERPL-MCNC: 11 MG/DL (ref 6–20)
CALCIUM SERPL-MCNC: 9.9 MG/DL (ref 8.6–10)
CHLORIDE SERPL-SCNC: 101 MMOL/L (ref 98–107)
COLOR UR AUTO: YELLOW
CREAT SERPL-MCNC: 0.66 MG/DL (ref 0.51–0.95)
DEPRECATED HCO3 PLAS-SCNC: 24 MMOL/L (ref 22–29)
EGFRCR SERPLBLD CKD-EPI 2021: >90 ML/MIN/1.73M2
EOSINOPHIL # BLD AUTO: 0 10E3/UL (ref 0–0.7)
EOSINOPHIL NFR BLD AUTO: 0 %
ERYTHROCYTE [DISTWIDTH] IN BLOOD BY AUTOMATED COUNT: 13.3 % (ref 10–15)
GLUCOSE SERPL-MCNC: 89 MG/DL (ref 70–99)
GLUCOSE UR STRIP-MCNC: NEGATIVE MG/DL
HCG SERPL QL: NEGATIVE
HCT VFR BLD AUTO: 37.7 % (ref 35–47)
HGB BLD-MCNC: 12.8 G/DL (ref 11.7–15.7)
HGB UR QL STRIP: ABNORMAL
HOLD SPECIMEN: NORMAL
IMM GRANULOCYTES # BLD: 0.1 10E3/UL
IMM GRANULOCYTES NFR BLD: 1 %
KETONES UR STRIP-MCNC: >150 MG/DL
LEUKOCYTE ESTERASE UR QL STRIP: ABNORMAL
LIPASE SERPL-CCNC: 25 U/L (ref 13–60)
LYMPHOCYTES # BLD AUTO: 2.3 10E3/UL (ref 0.8–5.3)
LYMPHOCYTES NFR BLD AUTO: 12 %
MAGNESIUM SERPL-MCNC: 1.9 MG/DL (ref 1.7–2.3)
MCH RBC QN AUTO: 30.5 PG (ref 26.5–33)
MCHC RBC AUTO-ENTMCNC: 34 G/DL (ref 31.5–36.5)
MCV RBC AUTO: 90 FL (ref 78–100)
MONOCYTES # BLD AUTO: 1.1 10E3/UL (ref 0–1.3)
MONOCYTES NFR BLD AUTO: 6 %
MUCOUS THREADS #/AREA URNS LPF: PRESENT /LPF
NEUTROPHILS # BLD AUTO: 15.2 10E3/UL (ref 1.6–8.3)
NEUTROPHILS NFR BLD AUTO: 81 %
NITRATE UR QL: NEGATIVE
NRBC # BLD AUTO: 0.1 10E3/UL
NRBC BLD AUTO-RTO: 0 /100
PH UR STRIP: 8.5 [PH] (ref 5–7)
PLATELET # BLD AUTO: 303 10E3/UL (ref 150–450)
POTASSIUM SERPL-SCNC: 3.7 MMOL/L (ref 3.4–5.3)
PROT SERPL-MCNC: 7.7 G/DL (ref 6.4–8.3)
RBC # BLD AUTO: 4.2 10E6/UL (ref 3.8–5.2)
RBC URINE: 3 /HPF
SODIUM SERPL-SCNC: 139 MMOL/L (ref 135–145)
SP GR UR STRIP: 1.03 (ref 1–1.03)
SQUAMOUS EPITHELIAL: 40 /HPF
UROBILINOGEN UR STRIP-MCNC: <2 MG/DL
WBC # BLD AUTO: 18.7 10E3/UL (ref 4–11)
WBC URINE: 3 /HPF

## 2024-05-15 PROCEDURE — 250N000011 HC RX IP 250 OP 636: Performed by: EMERGENCY MEDICINE

## 2024-05-15 PROCEDURE — 258N000003 HC RX IP 258 OP 636: Performed by: EMERGENCY MEDICINE

## 2024-05-15 PROCEDURE — 96374 THER/PROPH/DIAG INJ IV PUSH: CPT

## 2024-05-15 PROCEDURE — 87086 URINE CULTURE/COLONY COUNT: CPT | Performed by: EMERGENCY MEDICINE

## 2024-05-15 PROCEDURE — 84703 CHORIONIC GONADOTROPIN ASSAY: CPT | Performed by: EMERGENCY MEDICINE

## 2024-05-15 PROCEDURE — 83735 ASSAY OF MAGNESIUM: CPT | Performed by: EMERGENCY MEDICINE

## 2024-05-15 PROCEDURE — 81001 URINALYSIS AUTO W/SCOPE: CPT | Performed by: EMERGENCY MEDICINE

## 2024-05-15 PROCEDURE — 250N000013 HC RX MED GY IP 250 OP 250 PS 637: Performed by: EMERGENCY MEDICINE

## 2024-05-15 PROCEDURE — 80053 COMPREHEN METABOLIC PANEL: CPT | Performed by: EMERGENCY MEDICINE

## 2024-05-15 PROCEDURE — 96361 HYDRATE IV INFUSION ADD-ON: CPT

## 2024-05-15 PROCEDURE — 85041 AUTOMATED RBC COUNT: CPT | Performed by: EMERGENCY MEDICINE

## 2024-05-15 PROCEDURE — 83690 ASSAY OF LIPASE: CPT | Performed by: EMERGENCY MEDICINE

## 2024-05-15 PROCEDURE — 96375 TX/PRO/DX INJ NEW DRUG ADDON: CPT

## 2024-05-15 PROCEDURE — 99284 EMERGENCY DEPT VISIT MOD MDM: CPT | Mod: 25

## 2024-05-15 PROCEDURE — 36415 COLL VENOUS BLD VENIPUNCTURE: CPT | Performed by: EMERGENCY MEDICINE

## 2024-05-15 RX ORDER — SERTRALINE HYDROCHLORIDE 25 MG/1
TABLET, FILM COATED ORAL
Qty: 70 TABLET | Refills: 0 | Status: SHIPPED | OUTPATIENT
Start: 2024-05-15 | End: 2024-06-12

## 2024-05-15 RX ORDER — ONDANSETRON 4 MG/1
4 TABLET, ORALLY DISINTEGRATING ORAL EVERY 8 HOURS PRN
Qty: 10 TABLET | Refills: 0 | Status: SHIPPED | OUTPATIENT
Start: 2024-05-15

## 2024-05-15 RX ORDER — LORAZEPAM 2 MG/ML
1 INJECTION INTRAMUSCULAR ONCE
Status: COMPLETED | OUTPATIENT
Start: 2024-05-15 | End: 2024-05-15

## 2024-05-15 RX ORDER — HYDROXYZINE HYDROCHLORIDE 25 MG/1
50 TABLET, FILM COATED ORAL ONCE
Status: COMPLETED | OUTPATIENT
Start: 2024-05-15 | End: 2024-05-15

## 2024-05-15 RX ORDER — ONDANSETRON 2 MG/ML
4 INJECTION INTRAMUSCULAR; INTRAVENOUS ONCE
Status: COMPLETED | OUTPATIENT
Start: 2024-05-15 | End: 2024-05-15

## 2024-05-15 RX ORDER — HYDROXYZINE PAMOATE 50 MG/1
50 CAPSULE ORAL 3 TIMES DAILY PRN
Qty: 20 CAPSULE | Refills: 0 | Status: SHIPPED | OUTPATIENT
Start: 2024-05-15

## 2024-05-15 RX ADMIN — ONDANSETRON 4 MG: 2 INJECTION INTRAMUSCULAR; INTRAVENOUS at 17:10

## 2024-05-15 RX ADMIN — SODIUM CHLORIDE 1000 ML: 9 INJECTION, SOLUTION INTRAVENOUS at 17:11

## 2024-05-15 RX ADMIN — LORAZEPAM 1 MG: 2 INJECTION INTRAMUSCULAR; INTRAVENOUS at 19:36

## 2024-05-15 RX ADMIN — HYDROXYZINE HYDROCHLORIDE 50 MG: 25 TABLET, FILM COATED ORAL at 17:13

## 2024-05-15 ASSESSMENT — COLUMBIA-SUICIDE SEVERITY RATING SCALE - C-SSRS
1. IN THE PAST MONTH, HAVE YOU WISHED YOU WERE DEAD OR WISHED YOU COULD GO TO SLEEP AND NOT WAKE UP?: NO
6. HAVE YOU EVER DONE ANYTHING, STARTED TO DO ANYTHING, OR PREPARED TO DO ANYTHING TO END YOUR LIFE?: NO
2. HAVE YOU ACTUALLY HAD ANY THOUGHTS OF KILLING YOURSELF IN THE PAST MONTH?: NO

## 2024-05-15 ASSESSMENT — ACTIVITIES OF DAILY LIVING (ADL)
ADLS_ACUITY_SCORE: 37
ADLS_ACUITY_SCORE: 37

## 2024-05-15 NOTE — Clinical Note
Julita Fernandez was seen and treated in our emergency department on 5/15/2024.    The patient received IV Ativan here in the ED as part of her treatment.       Sincerely,     [facility] Emergency Department

## 2024-05-15 NOTE — ED PROVIDER NOTES
EMERGENCY DEPARTMENT ENCOUNTER      NAME: Julita Fernandez  AGE: 23 year old female  YOB: 2000  MRN: [id number]  EVALUATION DATE & TIME: No admission date for patient encounter.    PCP: Carolyn Latham    ED PROVIDER: Denice Jorge DO      Chief Complaint   Patient presents with    Vomiting         FINAL IMPRESSION:  1. Nausea and vomiting, unspecified vomiting type    2. Anxiety    3. History of alcohol abuse    4. Generalized anxiety disorder          ED COURSE & MEDICAL DECISION MAKIN-year-old female with history of anxiety and alcohol abuse presented to the ED for evaluation of nausea and vomiting with associated epigastric abdominal pain.  Patient states that she relapsed and drank alcohol yesterday for the first time weeks.  The patient also stated that her anxiety has been worsening recently.  The patient was tachycardic upon arrival to the ED.  She was otherwise hemodynamic stable.  The patient was slightly uncomfortable and moderately anxious appearing.  On exam she was noted to have mild epigastric abdominal tenderness to palpation.  She did not have evidence of an acute abdomen, however.    Following initial evaluation the patient was given IV fluids, Zofran, and hydroxyzine to treat her anxiety.    The patient's white blood cell count was elevated 18.7.  The patient typically appears to have a leukocytosis which time she is seen over the last few months.  The elevated white blood cell count may be due to demargination from her repeated vomiting episodes.    CMP, lipase, and magnesium were all reassuring.  hCG assay was negative.  Urinalysis shows ketones and hematuria.  There was no clear urinary tract infection.    The patient was reevaluated and informed of the laboratory results in the waiting room the patient appeared to be hyperventilating at the time of reevaluation.  She admitted that her anxiety was getting worse.  The patient did note that her abdominal discomfort  and nausea had improved.  The patient was then moved into a ED room and given IV Ativan to treat her worsening anxiety/panic attack.     The patient was reevaluated after receiving the IV Ativan.  Patient was much more relaxed appearing.  Patient stated that she felt much better.  Patient denied any abdominal pain nausea, or vomiting at the time reevaluation.  Following reevaluation the patient felt comfortable returning home.  The patient was sent home with Zofran to use as needed for any further episodes of nausea or vomiting.  She was also restarted on her sertraline with instructions to increase her dose by 25 mg every week until she gets back up to her 100 mg daily dose that she was on previously.  She was sent home with hydroxyzine to use as needed for any further acute episodes of anxiety and/or panic attacks.  The patient was instructed to continue with her treatment for her alcohol abuse history.  It was also recommended that she seek treatment for her underlying anxiety.  The patient was instructed to return back to ED sooner for any worsening pain, vomiting, anxiety, or any other new or concerning symptoms.    Pertinent Labs & Imaging studies reviewed. (See chart for details)  3:47 PM I met with the patient to gather history and to perform my initial exam. We discussed plans for the ED course, including diagnostic testing and treatment.       At the conclusion of the encounter I discussed the results of all of the tests and the disposition. The questions were answered. The patient or family acknowledged understanding and was agreeable with the care plan.     Medical Decision Making  Obtained supplemental history:Supplemental history obtained?: No  Reviewed external records: External records reviewed?: Documented in chart and Outpatient Record:  ED 5/11/24  Care impacted by chronic illness:Other: MELANY, PTSD, intractable nausea and vomiting   Care significantly affected by social determinants of  health:Alcohol Abuse and/or Recreational Drug Use  Did you consider but not order tests?: Work up considered but not performed and documented in chart, if applicable  Did you interpret images independently?: Independent interpretation of ECG and images noted in documentation, when applicable.  Consultation discussion with other provider:Did you involve another provider (consultant, , pharmacy, etc.)?: No  Discharge. I prescribed additional prescription strength medication(s) as charted. See documentation for any additional details.       PPE worn: n95 mask, goggles    MEDICATIONS GIVEN IN THE EMERGENCY:  Medications   sodium chloride 0.9% BOLUS 1,000 mL (0 mLs Intravenous Stopped 5/15/24 1850)   ondansetron (ZOFRAN) injection 4 mg (4 mg Intravenous $Given 5/15/24 1710)   hydrOXYzine HCl (ATARAX) tablet 50 mg (50 mg Oral $Given 5/15/24 1713)   LORazepam (ATIVAN) injection 1 mg (1 mg Intravenous $Given 5/15/24 1936)       NEW PRESCRIPTIONS STARTED AT TODAY'S ER VISIT  Discharge Medication List as of 5/15/2024  8:55 PM        START taking these medications    Details   hydrOXYzine (VISTARIL) 50 MG capsule Take 1 capsule (50 mg) by mouth 3 times daily as needed for anxiety, Disp-20 capsule, R-0, E-Prescribe      !! sertraline (ZOLOFT) 25 MG tablet Take 1 tablet (25 mg) by mouth daily for 7 days, THEN 2 tablets (50 mg) daily for 7 days, THEN 3 tablets (75 mg) daily for 7 days, THEN 4 tablets (100 mg) daily for 7 days., Disp-70 tablet, R-0, E-Prescribe       !! - Potential duplicate medications found. Please discuss with provider.             =================================================================    HPI    Patient information was obtained from: patient     Use of : N/A         uJlita Fernandez is a 23 year old female with a pertinent history of MELANY, PTSD, intractable nausea and vomiting, alcohol use, cannabis use, who presents to this ED via personal vehicle for evaluation of vomiting.     Per chart  review:   Patient was seen at  ED on 5/11/24 for nausea and vomiting after relapsing on alcohol the night before. No symptoms of withdrawal. Labs were deferred. Patient given benadryl, droperidol, vistaril, and IV fluids which improved symptoms and patient was discharged after successful PO challenge.     Patient reports she relapsed drinking last night and has now had nausea, vomiting, epigastric abdominal pain, room-spinning dizziness, faint feeling. Notes she is feeling anxious which will maker her vomit as well. States she had a few episodes of bright red blood (~2 tablespoons) the last few times she vomited. Has a history of hematemesis. Is planning to go to a sober living house tomorrow (5/16/24). Denies diarrhea, fever, or chest pain.     REVIEW OF SYSTEMS   See HPI     PAST MEDICAL HISTORY:  Past Medical History:   Diagnosis Date    Anxiety     Gastroesophageal reflux disease        PAST SURGICAL HISTORY:  No past surgical history on file.        CURRENT MEDICATIONS:    hydrOXYzine (VISTARIL) 50 MG capsule  ondansetron (ZOFRAN ODT) 4 MG ODT tab  sertraline (ZOLOFT) 25 MG tablet  cephALEXin (KEFLEX) 500 MG capsule  gabapentin (NEURONTIN) 300 MG capsule  levonorgestrel (MIRENA) 20 mcg/24 hours (5 yrs) 52 mg IUD  melatonin 5 MG CAPS  pantoprazole (PROTONIX) 40 MG EC tablet  sertraline (ZOLOFT) 100 MG tablet  traZODone (DESYREL) 50 MG tablet        ALLERGIES:  Allergies   Allergen Reactions    Soy [Isoflavones (Soy)] Other (See Comments)     Facial eczema        FAMILY HISTORY:  Family History   Problem Relation Age of Onset    Substance Abuse Father     Depression Father     Substance Abuse Paternal Grandmother     Depression Paternal Grandmother        SOCIAL HISTORY:   Social History     Socioeconomic History    Marital status: Single    Number of children: 0    Years of education: 13    Highest education level: High school graduate   Occupational History    Occupation: Student   Tobacco Use    Smoking  status: Every Day     Types: Cigarettes    Smokeless tobacco: Never   Vaping Use    Vaping status: Every Day    Start date: 1/1/2020    Last attempt to quit: 5/1/2022    Substances: Nicotine, THC, Flavoring    Devices: Disposable, Pre-filled or refillable cartridge   Substance and Sexual Activity    Alcohol use: Not Currently    Drug use: Yes     Frequency: 4.0 times per week     Types: Marijuana    Sexual activity: Yes     Partners: Male     Birth control/protection: I.U.D.   Social History [facility] in [place]. Taking a break now to save money and find her career path. Applying for jobs in the summer for [employer]; thinking about going to [email or web address] after [employer] for [employer] tech    Living with her parents- Tian (cardiologist) & Marijaaneesh Watson in high school, otherwise not vaping or smoking    Rare use of alcohol    Using THC almost daily in the mornings    Carolyn Latham MD         Social Determinants of Health     Financial Resource Strain: Low Risk  (11/2/2023)    Financial Resource Strain     Within the past 12 months, have you or your family members you live with been unable to get utilities (heat, electricity) when it was really needed?: No   Food Insecurity: Low Risk  (11/2/2023)    Food Insecurity     Within the past 12 months, did you worry that your food would run out before you got money to buy more?: No     Within the past 12 months, did the food you bought just not last and you didn t have money to get more?: No   Transportation Needs: Low Risk  (11/2/2023)    Transportation Needs     Within the past 12 months, has lack of transportation kept you from medical appointments, getting your medicines, non-medical meetings or appointments, work, or from getting things that you need?: No   Interpersonal Safety: Unknown (5/11/2024)    Received from [facility]    Humiliation, Afraid, Rape, and Kick questionnaire     Physically Abused: Patient unable to answer     " Sexually Abused: Patient unable to answer   Housing Stability: Low Risk  (11/2/2023)    Housing Stability     Do you have housing? : Yes     Are you worried about losing your housing?: No       VITALS:  /78   Pulse 97   Temp 98.3  F (36.8  C) (Oral)   Resp 20   Ht 1.626 m (5' 4\")   Wt 52.2 kg (115 lb)   SpO2 100%   BMI 19.74 kg/m      PHYSICAL EXAM    General presentation: Alert, Vital signs reviewed. NAD. Anxious appearing   HENT: ENT inspection is normal. Oropharynx is moist and clear.   Eye: Pupils are equal and reactive to light. EOMI  Neck: The neck is supple, with full ROM, with no evidence of meningismus.  Pulmonary: Currently in no acute respiratory distress. Normal, non labored respirations, the lung sounds are normal with good equal air movement. Clear to auscultation bilaterally.   Circulatory: Tachycardia with regular rhythm. Peripheral pulses are strong and equal. No murmurs, rubs, or gallops.   Abdominal: The abdomen is soft. Epigastric tenderness. No rigidity, guarding, or rebound. Bowel sounds normal.   Neurologic: Alert, oriented to person, place, and time. No motor deficit. No sensory deficit. Cranial nerves II through XII are intact.  Musculoskeletal: No extremity tenderness. Full range of motion in all extremities. No extremity edema.   Skin: Skin color is normal. No rash. Warm. Dry to touch.      LAB:  All pertinent labs reviewed and interpreted.  Results for orders placed or performed during the hospital encounter of 05/15/24   Comprehensive metabolic panel   Result Value Ref Range    Sodium 139 135 - 145 mmol/L    Potassium 3.7 3.4 - 5.3 mmol/L    Carbon Dioxide (CO2) 24 22 - 29 mmol/L    Anion Gap 14 7 - 15 mmol/L    Urea Nitrogen 11.0 6.0 - 20.0 mg/dL    Creatinine 0.66 0.51 - 0.95 mg/dL    GFR Estimate >90 >60 mL/min/1.73m2    Calcium 9.9 8.6 - 10.0 mg/dL    Chloride 101 98 - 107 mmol/L    Glucose 89 70 - 99 mg/dL    Alkaline Phosphatase 88 40 - 150 U/L    AST 41 0 - 45 U/L    " ALT 38 0 - 50 U/L    Protein Total 7.7 6.4 - 8.3 g/dL    Albumin 4.6 3.5 - 5.2 g/dL    Bilirubin Total 0.7 <=1.2 mg/dL   hCG QUALitative Pregnancy (blood)   Result Value Ref Range    hCG Serum Qualitative Negative Negative   Result Value Ref Range    Lipase 25 13 - 60 U/L   UA with Microscopic reflex to Culture    Specimen: Urine, Midstream   Result Value Ref Range    Color Urine Yellow Colorless, Straw, Light Yellow, Yellow    Appearance Urine Turbid (A) Clear    Glucose Urine Negative Negative mg/dL    Bilirubin Urine Negative Negative    Ketones Urine >150 (A) Negative mg/dL    Specific Gravity Urine 1.032 (H) 1.001 - 1.030    Blood Urine 0.1 mg/dL (A) Negative    pH Urine 8.5 (H) 5.0 - 7.0    Protein Albumin Urine 70 (A) Negative mg/dL    Urobilinogen Urine <2.0 <2.0 mg/dL    Nitrite Urine Negative Negative    Leukocyte Esterase Urine 250 Linda/uL (A) Negative    Mucus Urine Present (A) None Seen /LPF    RBC Urine 3 (H) <=2 /HPF    WBC Urine 3 <=5 /HPF    Squamous Epithelials Urine 40 (H) <=1 /HPF   Result Value Ref Range    Magnesium 1.9 1.7 - 2.3 mg/dL   CBC with platelets and differential   Result Value Ref Range    WBC Count 18.7 (H) 4.0 - 11.0 10e3/uL    RBC Count 4.20 3.80 - 5.20 10e6/uL    Hemoglobin 12.8 11.7 - 15.7 g/dL    Hematocrit 37.7 35.0 - 47.0 %    MCV 90 78 - 100 fL    MCH 30.5 26.5 - 33.0 pg    MCHC 34.0 31.5 - 36.5 g/dL    RDW 13.3 10.0 - 15.0 %    Platelet Count 303 150 - 450 10e3/uL    % Neutrophils 81 %    % Lymphocytes 12 %    % Monocytes 6 %    % Eosinophils 0 %    % Basophils 0 %    % Immature Granulocytes 1 %    NRBCs per 100 WBC 0 <1 /100    Absolute Neutrophils 15.2 (H) 1.6 - 8.3 10e3/uL    Absolute Lymphocytes 2.3 0.8 - 5.3 10e3/uL    Absolute Monocytes 1.1 0.0 - 1.3 10e3/uL    Absolute Eosinophils 0.0 0.0 - 0.7 10e3/uL    Absolute Basophils 0.1 0.0 - 0.2 10e3/uL    Absolute Immature Granulocytes 0.1 <=0.4 10e3/uL    Absolute NRBCs 0.1 10e3/uL   Extra Purple Top Tube   Result Value Ref  Range    Hold Specimen [facility]        RADIOLOGY:  Reviewed all pertinent imaging. Please see official radiology report.  No orders to display           I, Joanne Miranda , am serving as a scribe to document services personally performed by Denice Jorge DO based on my observation and the provider's statements to me. I, Denice Jorge, attest that Joanne Miranda is acting in a scribe capacity, has observed my performance of the services and has documented them in accordance with my direction.    Denice Jorge DO  Emergency Medicine  [facility] EMERGENCY DEPARTMENT  [address] [place] [id number] [phone]  Quite anxious     Denice Jorge DO  05/15/24 3190

## 2024-05-15 NOTE — ED TRIAGE NOTES
Patient presents here for evaluation of vomiting that has persisted since about 8 am this morning with several bouts. No diarrhea.   She notes that her abdomen has a achy pain. No fever. She reports that she was drinking alcohol last night, but she does not feel like she was intoxicated. She notes feeling anxious.

## 2024-05-16 NOTE — ED NOTES
Patient is anxious.  States she has a lot of various things going on in her life that make her feel anxious. But, didn't go into specific details.

## 2024-05-16 NOTE — DISCHARGE INSTRUCTIONS
Take the sertraline daily as directed to help control your anxiety.  You should increase this dose by 25 mg every week until you are back up to 100 mg daily.  Use the prescribed hydroxyzine as needed for any further episodes of anxiety and/or panic attacks.  Take the Zofran as needed for any further episodes of nausea or vomiting.      Please follow-up with your outpatient treatment program regarding your alcohol abuse history.  Please follow-up with a therapist/counselor regarding your chronic anxiety.    Return back to ED sooner for any worsening pain, vomiting, fevers, or any other new or concerning symptoms.

## 2024-05-17 LAB — BACTERIA UR CULT: NORMAL

## 2024-05-23 ENCOUNTER — OFFICE VISIT (OUTPATIENT)
Dept: URGENT CARE | Facility: URGENT CARE | Age: 24
End: 2024-05-23
Payer: COMMERCIAL

## 2024-05-23 VITALS
BODY MASS INDEX: 18.78 KG/M2 | HEIGHT: 64 IN | OXYGEN SATURATION: 98 % | RESPIRATION RATE: 15 BRPM | TEMPERATURE: 98.3 F | DIASTOLIC BLOOD PRESSURE: 70 MMHG | HEART RATE: 73 BPM | WEIGHT: 110 LBS | SYSTOLIC BLOOD PRESSURE: 122 MMHG

## 2024-05-23 DIAGNOSIS — B96.89 BV (BACTERIAL VAGINOSIS): Primary | ICD-10-CM

## 2024-05-23 DIAGNOSIS — N76.0 BV (BACTERIAL VAGINOSIS): Primary | ICD-10-CM

## 2024-05-23 DIAGNOSIS — Z91.89 AT RISK FOR SEXUALLY TRANSMITTED DISEASE DUE TO UNPROTECTED SEX: ICD-10-CM

## 2024-05-23 DIAGNOSIS — B37.31 YEAST INFECTION OF THE VAGINA: ICD-10-CM

## 2024-05-23 LAB
CLUE CELLS: PRESENT
TRICHOMONAS, WET PREP: ABNORMAL
WBC'S/HIGH POWER FIELD, WET PREP: ABNORMAL
YEAST, WET PREP: PRESENT

## 2024-05-23 PROCEDURE — 87210 SMEAR WET MOUNT SALINE/INK: CPT

## 2024-05-23 PROCEDURE — 87591 N.GONORRHOEAE DNA AMP PROB: CPT | Performed by: STUDENT IN AN ORGANIZED HEALTH CARE EDUCATION/TRAINING PROGRAM

## 2024-05-23 PROCEDURE — 36415 COLL VENOUS BLD VENIPUNCTURE: CPT | Performed by: STUDENT IN AN ORGANIZED HEALTH CARE EDUCATION/TRAINING PROGRAM

## 2024-05-23 PROCEDURE — 87389 HIV-1 AG W/HIV-1&-2 AB AG IA: CPT | Performed by: STUDENT IN AN ORGANIZED HEALTH CARE EDUCATION/TRAINING PROGRAM

## 2024-05-23 PROCEDURE — 99213 OFFICE O/P EST LOW 20 MIN: CPT | Performed by: STUDENT IN AN ORGANIZED HEALTH CARE EDUCATION/TRAINING PROGRAM

## 2024-05-23 PROCEDURE — 86780 TREPONEMA PALLIDUM: CPT | Performed by: STUDENT IN AN ORGANIZED HEALTH CARE EDUCATION/TRAINING PROGRAM

## 2024-05-23 PROCEDURE — 86803 HEPATITIS C AB TEST: CPT | Performed by: STUDENT IN AN ORGANIZED HEALTH CARE EDUCATION/TRAINING PROGRAM

## 2024-05-23 PROCEDURE — 87661 TRICHOMONAS VAGINALIS AMPLIF: CPT | Performed by: STUDENT IN AN ORGANIZED HEALTH CARE EDUCATION/TRAINING PROGRAM

## 2024-05-23 PROCEDURE — 87491 CHLMYD TRACH DNA AMP PROBE: CPT | Performed by: STUDENT IN AN ORGANIZED HEALTH CARE EDUCATION/TRAINING PROGRAM

## 2024-05-23 RX ORDER — FLUCONAZOLE 150 MG/1
150 TABLET ORAL ONCE
Qty: 1 TABLET | Refills: 0 | Status: SHIPPED | OUTPATIENT
Start: 2024-05-23 | End: 2024-05-23

## 2024-05-23 RX ORDER — METRONIDAZOLE 500 MG/1
500 TABLET ORAL 2 TIMES DAILY
Qty: 14 TABLET | Refills: 0 | Status: SHIPPED | OUTPATIENT
Start: 2024-05-23 | End: 2024-05-30

## 2024-05-23 NOTE — PATIENT INSTRUCTIONS
Safer Sex: Care Instructions  Overview  Safer sex is a way to reduce your risk of getting a sexually transmitted infection (STI). It can also help prevent pregnancy.  Several products can help you practice safer sex and reduce your chance of STIs. One of the best is a condom. There are internal and external condoms. You can use a special rubber sheet (dental dam) for protection during oral sex. Disposable gloves can keep your hands from touching blood, semen, or other body fluids that can carry infections.  Remember that birth control methods such as diaphragms, IUDs, foams, and birth control pills do not stop you from getting STIs.  Follow-up care is a key part of your treatment and safety. Be sure to make and go to all appointments, and call your doctor if you are having problems. It's also a good idea to know your test results and keep a list of the medicines you take.  How can you care for yourself at home?  Think about getting vaccinated to help prevent hepatitis A, hepatitis B, and human papillomavirus (HPV). They can be spread through sex.  Use a condom every time you have sex. Use an external condom, which goes on the penis. Or use an internal condom, which goes into the vagina or anus.  Make sure you use the right size external condom. A condom that's too small can break easily. A condom that's too big can slip off during sex.  Use a new condom each time you have sex. Be careful not to poke a hole in the condom when you open the wrapper.  Don't use an internal condom and an external condom at the same time.  Never use petroleum jelly (such as Vaseline), grease, hand lotion, baby oil, or anything with oil in it. These products can make holes in the condom.  After intercourse, hold the edge of the condom as you remove it. This will help keep semen from spilling out of the condom.  Do not have sex with anyone who has symptoms of an STI, such as sores on the genitals or mouth.  Do not drink a lot of alcohol or  "use drugs before sex.  Limit your sex partners. Sex with one partner who has sex only with you can reduce your risk of getting an STI.  Don't share sex toys. But if you do share them, use a condom and clean the sex toys between each use.  Talk to any partners before you have sex. Talk about what you feel comfortable with and whether you have any boundaries with sex. And find out if your partner or partners may be at risk for any STI. Keep in mind that a person may be able to spread an STI even if they do not have symptoms. You and any partners may want to get tested for STIs.  Where can you learn more?  Go to https://www.[employer].net/patiented  Enter B608 in the search box to learn more about \"Safer Sex: Care Instructions.\"  Current as of: November 27, 2023               Content Version: 14.0    1867-0556 [employer].   Care instructions adapted under license by your healthcare professional. If you have questions about a medical condition or this instruction, always ask your healthcare professional. [employer] disclaims any warranty or liability for your use of this information.      "

## 2024-05-23 NOTE — PROGRESS NOTES
Assessment & Plan     BV (bacterial vaginosis)  - Wet prep - Clinic Collect  - metroNIDAZOLE (FLAGYL) 500 MG tablet  Dispense: 14 tablet; Refill: 0    Yeast infection of the vagina  - fluconazole (DIFLUCAN) 150 MG tablet  Dispense: 1 tablet; Refill: 0    At risk for sexually transmitted disease due to unprotected sex  - Vaginal-Chlamydia trachomatis/Neisseria gonorrhoeae by PCR  - HIV Antigen Antibody Combo Cascade  - Treponema Abs w Reflex to RPR and Titer  - Trichomonas vaginalis by PCR (first-voided urine)  - Hepatitis C Screen Reflex to HCV RNA Quant and Genotype      Return for if symptoms do not improve in 2-3 days.    Radha Lopez, DO  she/her  [facility] URGENT CARE    Subjective     Julita Fernandez is a 23 year old female who presents to clinic today for the following health issues:    HPI    GYN Complaint  Treated for BV and yeast infection 4/15/24, symptoms resolved completely  Abnormal smell 3d ago restarted, no itching, change in discharge  Sexually active with multiple partners, not using condoms, has IUD  STI panel ok  No menses on Mirena    Past Medical History:   Diagnosis Date    Anxiety     Gastroesophageal reflux disease        Allergies   Allergen Reactions    Soy [Isoflavones (Soy)] Other (See Comments)     Facial eczema      Current Outpatient Medications   Medication Sig Dispense Refill    fluconazole (DIFLUCAN) 150 MG tablet Take 1 tablet (150 mg) by mouth once for 1 dose 1 tablet 0    levonorgestrel (MIRENA) 20 mcg/24 hours (5 yrs) 52 mg IUD [LEVONORGESTREL (MIRENA) 20 MCG/24 HOURS (5 YRS) 52 MG IUD] by Intrauterine route once for 1 dose. 1 each 0    metroNIDAZOLE (FLAGYL) 500 MG tablet Take 1 tablet (500 mg) by mouth 2 times daily for 7 days 14 tablet 0    sertraline (ZOLOFT) 100 MG tablet Take 1 tablet (100 mg) by mouth daily 90 tablet 1    cephALEXin (KEFLEX) 500 MG capsule 500 mg (Patient not taking: Reported on 4/15/2024)      gabapentin (NEURONTIN) 300 MG capsule Take 1  "capsule (300 mg) by mouth 3 times daily as needed (severe anxiety) (Patient not taking: Reported on 4/15/2024) 60 capsule 3    hydrOXYzine (VISTARIL) 50 MG capsule Take 1 capsule (50 mg) by mouth 3 times daily as needed for anxiety 20 capsule 0    melatonin 5 MG CAPS Take 5 mg by mouth nightly as needed (Sleep) (Patient not taking: Reported on 4/15/2024)      ondansetron (ZOFRAN ODT) 4 MG ODT tab Take 1 tablet (4 mg) by mouth every 8 hours as needed for nausea 10 tablet 0    pantoprazole (PROTONIX) 40 MG EC tablet TAKE 1 TABLET BY MOUTH EVERY DAY (Patient not taking: Reported on 4/15/2024) 90 tablet 1    sertraline (ZOLOFT) 25 MG tablet Take 1 tablet (25 mg) by mouth daily for 7 days, THEN 2 tablets (50 mg) daily for 7 days, THEN 3 tablets (75 mg) daily for 7 days, THEN 4 tablets (100 mg) daily for 7 days. 70 tablet 0    traZODone (DESYREL) 50 MG tablet        No current facility-administered medications for this visit.          Review of Systems  Constitutional, HEENT, cardiovascular, pulmonary, gi and gu systems are negative, except as otherwise noted.      Objective    /70   Pulse 73   Temp 98.3  F (36.8  C) (Temporal)   Resp 15   Ht 1.626 m (5' 4\")   Wt 49.9 kg (110 lb)   SpO2 98%   BMI 18.88 kg/m      Physical Exam   General: Alert and oriented, soft spoken  Skin: Warm and dry, no abnormalities noted.  Eyes: Extra-ocular muscles intact, pupils equal and reactive.  ENT: Speech intact, nasal passages open, no hearing impairment noted.  CV: No cyanosis or pallor, warm and well perfused.  Respiratory: No respiratory distress, no accessory muscle use.  Neuro: Gait and station normal, comprehension intact. Gross and fine motor skills intact.   Psychiatric: Mood and affect appear normal.   Extremities: Warm, able to move all four extremities at will.    Results for orders placed or performed in visit on 05/23/24   Wet prep - Clinic Collect     Status: Abnormal    Specimen: Vagina; Swab   Result Value Ref " Range    Trichomonas Absent Absent    Yeast Present (A) Absent    Clue Cells Present (A) Absent    WBCs/high power field None None           The use of Dragon/[employer] dictation services may have been used to construct the content in this note; any grammatical or spelling errors are non-intentional. Please contact the author of this note directly if you are in need of any clarification.

## 2024-05-24 LAB
C TRACH DNA SPEC QL PROBE+SIG AMP: NEGATIVE
HCV AB SERPL QL IA: NONREACTIVE
HIV 1+2 AB+HIV1 P24 AG SERPL QL IA: NONREACTIVE
N GONORRHOEA DNA SPEC QL NAA+PROBE: NEGATIVE
T PALLIDUM AB SER QL: NONREACTIVE
T VAGINALIS DNA SPEC QL NAA+PROBE: NOT DETECTED

## 2024-05-28 ENCOUNTER — PATIENT OUTREACH (OUTPATIENT)
Dept: CARE COORDINATION | Facility: CLINIC | Age: 24
End: 2024-05-28
Payer: COMMERCIAL

## 2024-07-02 ENCOUNTER — OFFICE VISIT (OUTPATIENT)
Dept: URGENT CARE | Facility: URGENT CARE | Age: 24
End: 2024-07-02
Payer: COMMERCIAL

## 2024-07-02 VITALS
BODY MASS INDEX: 19.63 KG/M2 | TEMPERATURE: 98.2 F | WEIGHT: 115 LBS | HEIGHT: 64 IN | HEART RATE: 92 BPM | OXYGEN SATURATION: 99 %

## 2024-07-02 DIAGNOSIS — R39.15 URGENCY OF URINATION: Primary | ICD-10-CM

## 2024-07-02 DIAGNOSIS — N30.01 ACUTE CYSTITIS WITH HEMATURIA: ICD-10-CM

## 2024-07-02 DIAGNOSIS — N76.0 BV (BACTERIAL VAGINOSIS): ICD-10-CM

## 2024-07-02 DIAGNOSIS — B96.89 BV (BACTERIAL VAGINOSIS): ICD-10-CM

## 2024-07-02 LAB
ALBUMIN UR-MCNC: 100 MG/DL
APPEARANCE UR: CLEAR
BACTERIA #/AREA URNS HPF: ABNORMAL /HPF
BILIRUB UR QL STRIP: ABNORMAL
CLUE CELLS: PRESENT
COLOR UR AUTO: YELLOW
GLUCOSE UR STRIP-MCNC: NEGATIVE MG/DL
HGB UR QL STRIP: ABNORMAL
HYALINE CASTS #/AREA URNS LPF: ABNORMAL /LPF
KETONES UR STRIP-MCNC: NEGATIVE MG/DL
LEUKOCYTE ESTERASE UR QL STRIP: ABNORMAL
MUCOUS THREADS #/AREA URNS LPF: PRESENT /LPF
NITRATE UR QL: NEGATIVE
PH UR STRIP: 5.5 [PH] (ref 5–7)
RBC #/AREA URNS AUTO: ABNORMAL /HPF
SP GR UR STRIP: >=1.03 (ref 1–1.03)
SQUAMOUS #/AREA URNS AUTO: ABNORMAL /LPF
TRICHOMONAS, WET PREP: ABNORMAL
UROBILINOGEN UR STRIP-ACNC: 0.2 E.U./DL
WBC #/AREA URNS AUTO: ABNORMAL /HPF
WBC'S/HIGH POWER FIELD, WET PREP: ABNORMAL
YEAST, WET PREP: ABNORMAL

## 2024-07-02 PROCEDURE — 87186 SC STD MICRODIL/AGAR DIL: CPT | Performed by: NURSE PRACTITIONER

## 2024-07-02 PROCEDURE — 87210 SMEAR WET MOUNT SALINE/INK: CPT

## 2024-07-02 PROCEDURE — 81001 URINALYSIS AUTO W/SCOPE: CPT

## 2024-07-02 PROCEDURE — 99213 OFFICE O/P EST LOW 20 MIN: CPT | Performed by: NURSE PRACTITIONER

## 2024-07-02 PROCEDURE — 87086 URINE CULTURE/COLONY COUNT: CPT | Performed by: NURSE PRACTITIONER

## 2024-07-02 RX ORDER — METRONIDAZOLE 500 MG/1
500 TABLET ORAL 2 TIMES DAILY
Qty: 14 TABLET | Refills: 0 | Status: SHIPPED | OUTPATIENT
Start: 2024-07-02 | End: 2024-07-09

## 2024-07-02 RX ORDER — NITROFURANTOIN 25; 75 MG/1; MG/1
100 CAPSULE ORAL 2 TIMES DAILY
Qty: 10 CAPSULE | Refills: 0 | Status: SHIPPED | OUTPATIENT
Start: 2024-07-02 | End: 2024-07-07

## 2024-07-02 NOTE — PROGRESS NOTES
No chief complaint on file.    SUBJECTIVE:   Julita Fernandez is a 23 year old female who  presents today for a possible UTI.  She describes having urinary frequency urgency pressure burning over the past 2 days.  She has an IUD and is currently spotting.  Declines fever sweats chills nausea vomiting flank pain.  She has had yeast and bacterial vaginosis infections previously.  No concerns for STDs here today.    Past Medical History:   Diagnosis Date    Anxiety     Gastroesophageal reflux disease      Current Outpatient Medications   Medication Sig Dispense Refill    hydrOXYzine (VISTARIL) 50 MG capsule Take 1 capsule (50 mg) by mouth 3 times daily as needed for anxiety 20 capsule 0    levonorgestrel (MIRENA) 20 mcg/24 hours (5 yrs) 52 mg IUD [LEVONORGESTREL (MIRENA) 20 MCG/24 HOURS (5 YRS) 52 MG IUD] by Intrauterine route once for 1 dose. 1 each 0    metroNIDAZOLE (FLAGYL) 500 MG tablet Take 1 tablet (500 mg) by mouth 2 times daily for 7 days 14 tablet 0    naltrexone (VIVITROL) 380 MG SUSR Inject into the muscle once      nitroFURantoin macrocrystal-monohydrate (MACROBID) 100 MG capsule Take 1 capsule (100 mg) by mouth 2 times daily for 5 days 10 capsule 0    sertraline (ZOLOFT) 100 MG tablet Take 1 tablet (100 mg) by mouth daily 90 tablet 1    traZODone (DESYREL) 50 MG tablet       cephALEXin (KEFLEX) 500 MG capsule 500 mg (Patient not taking: Reported on 4/15/2024)      gabapentin (NEURONTIN) 300 MG capsule Take 1 capsule (300 mg) by mouth 3 times daily as needed (severe anxiety) (Patient not taking: Reported on 4/15/2024) 60 capsule 3    melatonin 5 MG CAPS Take 5 mg by mouth nightly as needed (Sleep) (Patient not taking: Reported on 4/15/2024)      ondansetron (ZOFRAN ODT) 4 MG ODT tab Take 1 tablet (4 mg) by mouth every 8 hours as needed for nausea 10 tablet 0    pantoprazole (PROTONIX) 40 MG EC tablet TAKE 1 TABLET BY MOUTH EVERY DAY (Patient not taking: Reported on 4/15/2024) 90 tablet 1    sertraline  "(ZOLOFT) 25 MG tablet Take 1 tablet (25 mg) by mouth daily for 7 days, THEN 2 tablets (50 mg) daily for 7 days, THEN 3 tablets (75 mg) daily for 7 days, THEN 4 tablets (100 mg) daily for 7 days. 70 tablet 0     Social History     Tobacco Use    Smoking status: Former     Types: Cigarettes    Smokeless tobacco: Never   Substance Use Topics    Alcohol use: Not Currently     Allergies   Allergen Reactions    Soy [Isoflavones (Soy)] Other (See Comments)     Facial eczema      Review of Systems  All systems negative except for those listed above in HPI.    OBJECTIVE:  Pulse 92   Temp 98.2  F (36.8  C) (Temporal)   Ht 1.626 m (5' 4\")   Wt 52.2 kg (115 lb)   SpO2 99%   BMI 19.74 kg/m      Physical Exam  Vitals reviewed.   Constitutional:       General: She is not in acute distress.     Appearance: Normal appearance. She is not ill-appearing, toxic-appearing or diaphoretic.   HENT:      Head: Normocephalic and atraumatic.   Cardiovascular:      Rate and Rhythm: Normal rate.      Pulses: Normal pulses.   Pulmonary:      Effort: Pulmonary effort is normal.   Abdominal:      General: There is no distension.      Tenderness: There is no abdominal tenderness. There is no right CVA tenderness, left CVA tenderness or guarding.   Musculoskeletal:         General: Normal range of motion.   Skin:     General: Skin is warm and dry.      Coloration: Skin is not pale.      Findings: No rash.   Neurological:      General: No focal deficit present.      Mental Status: She is alert and oriented to person, place, and time.      Motor: No weakness.      Gait: Gait normal.   Psychiatric:         Mood and Affect: Mood normal.         Behavior: Behavior normal.       Results for orders placed or performed in visit on 07/02/24   UA Macroscopic with reflex to Microscopic and Culture - Clinic Collect     Status: Abnormal    Specimen: Urine, Clean Catch   Result Value Ref Range    Color Urine Yellow Colorless, Straw, Light Yellow, Yellow    " Appearance Urine Clear Clear    Glucose Urine Negative Negative mg/dL    Bilirubin Urine Small (A) Negative    Ketones Urine Negative Negative mg/dL    Specific Gravity Urine >=1.030 1.003 - 1.035    Blood Urine Small (A) Negative    pH Urine 5.5 5.0 - 7.0    Protein Albumin Urine 100 (A) Negative mg/dL    Urobilinogen Urine 0.2 0.2, 1.0 E.U./dL    Nitrite Urine Negative Negative    Leukocyte Esterase Urine Small (A) Negative   UA Microscopic with Reflex to Culture     Status: Abnormal   Result Value Ref Range    Bacteria Urine Few (A) None Seen /HPF    RBC Urine 2-5 (A) 0-2 /HPF /HPF    WBC Urine 5-10 (A) 0-5 /HPF /HPF    Squamous Epithelials Urine Few (A) None Seen /LPF    Mucus Urine Present (A) None Seen /LPF    Hyaline Casts Urine 0-2 (A) None Seen /LPF    Narrative    Urine Culture not indicated   Wet prep - Clinic Collect     Status: Abnormal    Specimen: Vagina; Swab   Result Value Ref Range    Trichomonas Absent Absent    Yeast Absent Absent    Clue Cells Present (A) Absent    WBCs/high power field None None   Urine Culture Aerobic Bacterial - lab collect     Status: Abnormal    Specimen: Urine, Clean Catch   Result Value Ref Range    Culture 50,000-100,000 CFU/mL Escherichia coli (A)        Susceptibility    Escherichia coli - ELIZABETH     Ampicillin 4 Susceptible ug/mL     Ampicillin/ Sulbactam <=2 Susceptible ug/mL     Piperacillin/Tazobactam <=4 Susceptible ug/mL     Cefazolin* <=4 Susceptible ug/mL      * Cefazolin ELIZABETH breakpoints are for the treatment of uncomplicated urinary tract infections. For the treatment of systemic infections, please contact the laboratory for additional testing.     Cefoxitin <=4 Susceptible ug/mL     Ceftazidime <=1 Susceptible ug/mL     Ceftriaxone <=1 Susceptible ug/mL     Cefepime <=1 Susceptible ug/mL     Gentamicin <=1 Susceptible ug/mL     Tobramycin <=1 Susceptible ug/mL     Ciprofloxacin <=0.25 Susceptible ug/mL     Levofloxacin <=0.12 Susceptible ug/mL     Nitrofurantoin  <=16 Susceptible ug/mL     Trimethoprim/Sulfamethoxazole <=1/19 Susceptible ug/mL     ASSESSMENT:    ICD-10-CM    1. Urgency of urination  R39.15 UA Macroscopic with reflex to Microscopic and Culture - Clinic Collect     Wet prep - Clinic Collect     UA Microscopic with Reflex to Culture     Urine Culture Aerobic Bacterial - lab collect     Urine Culture Aerobic Bacterial - lab collect      2. BV (bacterial vaginosis)  N76.0 metroNIDAZOLE (FLAGYL) 500 MG tablet    B96.89       3. Acute cystitis with hematuria  N30.01 nitroFURantoin macrocrystal-monohydrate (MACROBID) 100 MG capsule        PLAN:     Take full course of antibiotics.  Macrobid for urinary tract infection  Flagyl for BV  Urine culture pending, we will call you only if culture shows resistance and change of antibiotic is required or if no growth to stop antibiotics and to follow-up with your primary care provider.  May use over the counter AZO pain relief or Uristat (phenazopyridine) for urinary burning but do not use for 24 hours before future urine tests.  Drink plenty of fluids. Limit caffeine and alcohol as these are bladder irritants.  May take tylenol or ibuprofen as needed for discomfort.   If you develop any vomiting, high fevers or lower back pain, these can be signs of a kidney infection and you should be seen in urgent care or in the ER.  Prevention of future infections by drinking cranberry juice, urination after intercourse and wiping from front to back after using the toilet.  Please follow up with primary care provider if symptoms return, if you're not improving, worsening or new symptoms or for any adverse reactions to medications.     Follow up with primary care provider with any problems, questions or concerns or if symptoms worsen or fail to improve. Patient agreed to plan and verbalized understanding.    Samantha Josue, [facility]

## 2024-07-02 NOTE — PATIENT INSTRUCTIONS
Take full course of antibiotics.  Macrobid for urinary tract infection  Flagyl for BV  Urine culture pending, we will call you only if culture shows resistance and change of antibiotic is required or if no growth to stop antibiotics and to follow-up with your primary care provider.  May use over the counter AZO pain relief or Uristat (phenazopyridine) for urinary burning but do not use for 24 hours before future urine tests.  Drink plenty of fluids. Limit caffeine and alcohol as these are bladder irritants.  May take tylenol or ibuprofen as needed for discomfort.   If you develop any vomiting, high fevers or lower back pain, these can be signs of a kidney infection and you should be seen in urgent care or in the ER.  Prevention of future infections by drinking cranberry juice, urination after intercourse and wiping from front to back after using the toilet.  Please follow up with primary care provider if symptoms return, if you're not improving, worsening or new symptoms or for any adverse reactions to medications.

## 2024-07-03 LAB — BACTERIA UR CULT: ABNORMAL

## 2024-07-25 ENCOUNTER — OFFICE VISIT (OUTPATIENT)
Dept: URGENT CARE | Facility: URGENT CARE | Age: 24
End: 2024-07-25
Payer: COMMERCIAL

## 2024-07-25 VITALS
OXYGEN SATURATION: 97 % | WEIGHT: 110 LBS | TEMPERATURE: 97.6 F | HEIGHT: 64 IN | RESPIRATION RATE: 15 BRPM | BODY MASS INDEX: 18.78 KG/M2 | SYSTOLIC BLOOD PRESSURE: 116 MMHG | DIASTOLIC BLOOD PRESSURE: 80 MMHG | HEART RATE: 84 BPM

## 2024-07-25 DIAGNOSIS — N89.8 VAGINAL DISCHARGE: Primary | ICD-10-CM

## 2024-07-25 LAB
CLUE CELLS: NORMAL
TRICHOMONAS, WET PREP: NORMAL
WBC'S/HIGH POWER FIELD, WET PREP: NORMAL
YEAST, WET PREP: NORMAL

## 2024-07-25 PROCEDURE — 87210 SMEAR WET MOUNT SALINE/INK: CPT | Performed by: NURSE PRACTITIONER

## 2024-07-25 PROCEDURE — 99213 OFFICE O/P EST LOW 20 MIN: CPT | Performed by: NURSE PRACTITIONER

## 2024-07-25 RX ORDER — CLONIDINE HYDROCHLORIDE 0.1 MG/1
0.1 TABLET ORAL 2 TIMES DAILY
COMMUNITY

## 2024-07-25 NOTE — PROGRESS NOTES
"Assessment & Plan     1. Vaginal discharge  Negative wet prep symptoms likely related to menstrual cycle.   - Wet prep - Clinic Collect      VALARIE Rivera CNP  [facility] [place]    Liliana Cancino is a 23 year old female who presents to clinic today for the following health issues:  Chief Complaint   Patient presents with    Urgent Care     Vaginal odor, was seen recently for this but came back.        HPI      GYN Complaint    Onset of symptoms was 1 day(s) ago.  Course of illness is waxing and waning.    Severity mild  Current and Associated symptoms: vaginal discharge described as clear and white  Treatment measures tried include:  None  Sexually active: no  Predisposing factors: None  Hx of previous symptom: recently treated for BV      Review of Systems  Constitutional, HEENT, cardiovascular, pulmonary, gi and gu systems are negative, except as otherwise noted.      Objective    /80   Pulse 84   Temp 97.6  F (36.4  C) (Temporal)   Resp 15   Ht 1.626 m (5' 4\")   Wt 49.9 kg (110 lb)   SpO2 97%   BMI 18.88 kg/m    Physical Exam   GENERAL: alert and no distress  NECK: no adenopathy, no asymmetry, masses, or scars  RESP: lungs clear to auscultation - no rales, rhonchi or wheezes  CV: regular rate and rhythm, normal S1 S2, no S3 or S4, no murmur, click or rub, no peripheral edema  ABDOMEN: soft, nontender, no hepatosplenomegaly, no masses and bowel sounds normal  MS: no gross musculoskeletal defects noted, no edema          "

## 2024-09-15 ENCOUNTER — HEALTH MAINTENANCE LETTER (OUTPATIENT)
Age: 24
End: 2024-09-15

## 2024-09-17 ENCOUNTER — OFFICE VISIT (OUTPATIENT)
Dept: FAMILY MEDICINE | Facility: CLINIC | Age: 24
End: 2024-09-17
Payer: COMMERCIAL

## 2024-09-17 VITALS
SYSTOLIC BLOOD PRESSURE: 117 MMHG | BODY MASS INDEX: 17.42 KG/M2 | DIASTOLIC BLOOD PRESSURE: 78 MMHG | WEIGHT: 102 LBS | HEIGHT: 64 IN | HEART RATE: 94 BPM | RESPIRATION RATE: 16 BRPM | TEMPERATURE: 98.1 F | OXYGEN SATURATION: 100 %

## 2024-09-17 DIAGNOSIS — N89.8 VAGINAL ODOR: Primary | ICD-10-CM

## 2024-09-17 DIAGNOSIS — B96.89 BV (BACTERIAL VAGINOSIS): ICD-10-CM

## 2024-09-17 DIAGNOSIS — N76.0 BV (BACTERIAL VAGINOSIS): ICD-10-CM

## 2024-09-17 LAB
CLUE CELLS: PRESENT
TRICHOMONAS, WET PREP: ABNORMAL
WBC'S/HIGH POWER FIELD, WET PREP: ABNORMAL
YEAST, WET PREP: ABNORMAL

## 2024-09-17 PROCEDURE — 87210 SMEAR WET MOUNT SALINE/INK: CPT | Performed by: PHYSICIAN ASSISTANT

## 2024-09-17 PROCEDURE — 99213 OFFICE O/P EST LOW 20 MIN: CPT | Performed by: PHYSICIAN ASSISTANT

## 2024-09-17 ASSESSMENT — PATIENT HEALTH QUESTIONNAIRE - PHQ9
SUM OF ALL RESPONSES TO PHQ QUESTIONS 1-9: 0
10. IF YOU CHECKED OFF ANY PROBLEMS, HOW DIFFICULT HAVE THESE PROBLEMS MADE IT FOR YOU TO DO YOUR WORK, TAKE CARE OF THINGS AT HOME, OR GET ALONG WITH OTHER PEOPLE: NOT DIFFICULT AT ALL
SUM OF ALL RESPONSES TO PHQ QUESTIONS 1-9: 0

## 2024-09-17 NOTE — PROGRESS NOTES
Preventive Care Visit  [facility]  Amy Alberts PA-C, Family Medicine  Sep 17, 2024  {Provider  Link to SmartSet :271565}    {PROVIDER CHARTING PREFERENCE:398273}    Subjective   Julita is a 23 year old, presenting for the following:  Physical (Pt is here for annual physical , pt stated that she had BV sometime ago and would to get pap done.)        9/17/2024     3:19 PM   Additional Questions   Roomed by Lily   Accompanied by self        Health Care Directive  Patient does not have a Health Care Directive or Living Will: {ADVANCE_DIRECTIVE_STATUS:554178}    HPI  ***  {MA/LPN/RN Pre-Provider Visit Orders- hCG/UA/Strep (Optional):539060}  {SUPERLIST (Optional):838027}  {additonal problems for provider to add (Optional):067931}      9/17/2024   General Health   How would you rate your overall physical health? Good   Feel stress (tense, anxious, or unable to sleep) Not at all            9/17/2024   Nutrition   Three or more servings of calcium each day? Yes   Diet: Vegetarian/vegan   How many servings of fruit and vegetables per day? (!) 2-3   How many sweetened beverages each day? 0-1            9/17/2024   Exercise   Days per week of moderate/strenous exercise 4 days            9/17/2024   Social Factors   Frequency of gathering with friends or relatives More than three times a week   Worry food won't last until get money to buy more No   Food not last or not have enough money for food? No   Do you have housing? (Housing is defined as stable permanent housing and does not include staying ouside in a car, in a tent, in an abandoned building, in an overnight shelter, or couch-surfing.) Yes   Are you worried about losing your housing? No   Lack of transportation? No   Unable to get utilities (heat,electricity)? No            9/17/2024   Dental   Dentist two times every year? Yes            9/17/2024   TB Screening   Were you born outside of the [place]? No          Today's PHQ-9 Score:  "      9/17/2024     2:35 PM   PHQ-9 SCORE   PHQ-9 Total Score MyChart 0   PHQ-9 Total Score 0         9/17/2024   Substance Use   Alcohol more than 3/day or more than 7/wk No   Do you use any other substances recreationally? (!) CANNABIS PRODUCTS        Social History     Tobacco Use    Smoking status: Former     Types: Cigarettes    Smokeless tobacco: Never   Vaping Use    Vaping status: Every Day    Start date: 1/1/2020    Last attempt to quit: 5/1/2022    Substances: Nicotine, THC, Flavoring    Devices: Disposable, Pre-filled or refillable cartridge   Substance Use Topics    Alcohol use: Not Currently    Drug use: Yes     Frequency: 4.0 times per week     Types: Marijuana     {Provider  If there are gaps in the social history shown above, please follow the link to update and then refresh the note Link to Social and Substance History :153558}      9/17/2024   STI Screening   New sexual partner(s) since last STI/HIV test? No        History of abnormal Pap smear: { :624892}             9/17/2024   Contraception/Family Planning   Questions about contraception or family planning No        {Provider  REQUIRED FOR AWV Use the storyboard to review patient history, after sections have been marked as reviewed, refresh note to capture documentation:004486}   Reviewed and updated as needed this visit by Provider                    {HISTORY OPTIONS (Optional):827718}    {ROS Picklists (Optional):596471}     Objective    Exam  /78 (BP Location: Right arm, Patient Position: Sitting, Cuff Size: Adult Regular)   Pulse 94   Temp 98.1  F (36.7  C) (Temporal)   Resp 16   Ht 1.635 m (5' 4.37\")   Wt 46.3 kg (102 lb)   SpO2 100%   BMI 17.31 kg/m     Estimated body mass index is 17.31 kg/m  as calculated from the following:    Height as of this encounter: 1.635 m (5' 4.37\").    Weight as of this encounter: 46.3 kg (102 lb).    Physical Exam  {Exam Choices (Optional):093196}        Signed Electronically by: Amy Callahan " "ARMAAN Alberts  Prior to immunization administration, verified patients identity using patient s name and date of birth. Please see Immunization Activity for additional information.     Screening Questionnaire for Adult Immunization    Are you sick today?   No   Do you have allergies to medications, food, a vaccine component or latex?   No   Have you ever had a serious reaction after receiving a vaccination?   No   Do you have a long-term health problem with heart, lung, kidney, or metabolic disease (e.g., diabetes), asthma, a blood disorder, no spleen, complement component deficiency, a cochlear implant, or a spinal fluid leak?  Are you on long-term aspirin therapy?   No   Do you have cancer, leukemia, HIV/AIDS, or any other immune system problem?   No   Do you have a parent, brother, or sister with an immune system problem?   No   In the past 3 months, have you taken medications that affect  your immune system, such as prednisone, other steroids, or anticancer drugs; drugs for the treatment of rheumatoid arthritis, Crohn s disease, or psoriasis; or have you had radiation treatments?   No   Have you had a seizure, or a brain or other nervous system problem?   No   During the past year, have you received a transfusion of blood or blood    products, or been given immune (gamma) globulin or antiviral drug?   No   For women: Are you pregnant or is there a chance you could become       pregnant during the next month?   No   Have you received any vaccinations in the past 4 weeks?   No     Immunization questionnaire { :042717::\"answers were all negative.\"}      Patient instructed to remain in clinic for 15 minutes afterwards, and to report any adverse reactions.     Screening performed by Lily Herrera MA on 9/17/2024 at 3:23 PM.   {Email feedback regarding this note to [email or web address]   :399357}  Answers submitted by the patient for this visit:  Patient Health Questionnaire (Submitted " on 9/17/2024)  If you checked off any problems, how difficult have these problems made it for you to do your work, take care of things at home, or get along with other people?: Not difficult at all  PHQ9 TOTAL SCORE: 0

## 2024-09-20 NOTE — PROGRESS NOTES
"  Assessment & Plan     Vaginal odor  -suspect BV  -will send clindamycin vaginal   -Side effects of medication(s) discussed as well as risks/benefits of taking    -follow-up prn  - Wet prep - Clinic Collect  - clindamycin (CLEOCIN) 100 MG vaginal suppository; Place 1 suppository (100 mg) vaginally at bedtime for 3 days.    BV (bacterial vaginosis)  - Wet prep - Clinic Collect  - clindamycin (CLEOCIN) 100 MG vaginal suppository; Place 1 suppository (100 mg) vaginally at bedtime for 3 days.      Counseling  Appropriate preventive services were addressed with this patient via screening, questionnaire, or discussion as appropriate for fall prevention, nutrition, physical activity, Tobacco-use cessation, social engagement, weight loss and cognition.  Checklist reviewing preventive services available has been given to the patient.  Reviewed patient's diet, addressing concerns and/or questions.           Subjective   Julita is a 23 year old, presenting for the following health issues:  Physical (Pt is here for annual physical , pt stated that she had BV sometime ago and would to get pap done.)        9/17/2024     3:19 PM   Additional Questions   Roomed by Lily   Accompanied by self     HPI     -has vaginal odor, has had BV in the past.  Thinks she has this again  -no concerns for STIs  -no vaginal pain or urinary sxs  -no abdominal pain, no cramping    -would like to do self collection of vaginal swab.  Will RTC for pap.        Objective    /78 (BP Location: Right arm, Patient Position: Sitting, Cuff Size: Adult Regular)   Pulse 94   Temp 98.1  F (36.7  C) (Temporal)   Resp 16   Ht 1.635 m (5' 4.37\")   Wt 46.3 kg (102 lb)   SpO2 100%   BMI 17.31 kg/m    Body mass index is 17.31 kg/m .  Physical Exam   GENERAL: alert and no distress  NECK: no adenopathy, no asymmetry, masses, or scars  RESP: lungs clear to auscultation - no rales, rhonchi or wheezes  CV: regular rate and rhythm, normal S1 S2, no S3 or S4, no " murmur, click or rub, no peripheral edema  ABDOMEN: soft, nontender, no hepatosplenomegaly, no masses and bowel sounds normal  MS: no gross musculoskeletal defects noted, no edema    Results for orders placed or performed in visit on 09/17/24   Wet prep - Clinic Collect     Status: Abnormal    Specimen: Vagina; Swab   Result Value Ref Range    Trichomonas Absent Absent    Yeast Absent Absent    Clue Cells Present (A) Absent    WBCs/high power field 1+ (A) None           Signed Electronically by: Amy Alberts PA-C    Answers submitted by the patient for this visit:  Patient Health Questionnaire (Submitted on 9/17/2024)  If you checked off any problems, how difficult have these problems made it for you to do your work, take care of things at home, or get along with other people?: Not difficult at all  PHQ9 TOTAL SCORE: 0

## 2024-10-10 ENCOUNTER — OFFICE VISIT (OUTPATIENT)
Dept: FAMILY MEDICINE | Facility: CLINIC | Age: 24
End: 2024-10-10
Payer: COMMERCIAL

## 2024-10-10 VITALS
WEIGHT: 102 LBS | HEART RATE: 96 BPM | SYSTOLIC BLOOD PRESSURE: 117 MMHG | TEMPERATURE: 98.3 F | BODY MASS INDEX: 17.31 KG/M2 | DIASTOLIC BLOOD PRESSURE: 82 MMHG | OXYGEN SATURATION: 100 % | RESPIRATION RATE: 18 BRPM

## 2024-10-10 DIAGNOSIS — B96.89 BV (BACTERIAL VAGINOSIS): Primary | ICD-10-CM

## 2024-10-10 DIAGNOSIS — N89.8 VAGINAL DISCHARGE: ICD-10-CM

## 2024-10-10 DIAGNOSIS — N76.0 BV (BACTERIAL VAGINOSIS): Primary | ICD-10-CM

## 2024-10-10 DIAGNOSIS — Z11.3 SCREEN FOR STD (SEXUALLY TRANSMITTED DISEASE): ICD-10-CM

## 2024-10-10 PROCEDURE — 87491 CHLMYD TRACH DNA AMP PROBE: CPT | Performed by: STUDENT IN AN ORGANIZED HEALTH CARE EDUCATION/TRAINING PROGRAM

## 2024-10-10 PROCEDURE — 86803 HEPATITIS C AB TEST: CPT | Performed by: STUDENT IN AN ORGANIZED HEALTH CARE EDUCATION/TRAINING PROGRAM

## 2024-10-10 PROCEDURE — 87210 SMEAR WET MOUNT SALINE/INK: CPT | Performed by: STUDENT IN AN ORGANIZED HEALTH CARE EDUCATION/TRAINING PROGRAM

## 2024-10-10 PROCEDURE — 99214 OFFICE O/P EST MOD 30 MIN: CPT | Performed by: STUDENT IN AN ORGANIZED HEALTH CARE EDUCATION/TRAINING PROGRAM

## 2024-10-10 PROCEDURE — 36415 COLL VENOUS BLD VENIPUNCTURE: CPT | Performed by: STUDENT IN AN ORGANIZED HEALTH CARE EDUCATION/TRAINING PROGRAM

## 2024-10-10 PROCEDURE — 87591 N.GONORRHOEAE DNA AMP PROB: CPT | Performed by: STUDENT IN AN ORGANIZED HEALTH CARE EDUCATION/TRAINING PROGRAM

## 2024-10-10 PROCEDURE — 87389 HIV-1 AG W/HIV-1&-2 AB AG IA: CPT | Performed by: STUDENT IN AN ORGANIZED HEALTH CARE EDUCATION/TRAINING PROGRAM

## 2024-10-10 PROCEDURE — 86780 TREPONEMA PALLIDUM: CPT | Performed by: STUDENT IN AN ORGANIZED HEALTH CARE EDUCATION/TRAINING PROGRAM

## 2024-10-10 RX ORDER — METRONIDAZOLE 7.5 MG/G
1 GEL VAGINAL DAILY
Qty: 25 G | Refills: 0 | Status: SHIPPED | OUTPATIENT
Start: 2024-10-10 | End: 2024-10-15

## 2024-10-10 NOTE — PROGRESS NOTES
ASSESSMENT & PLAN:   Diagnoses and all orders for this visit:  BV (bacterial vaginosis)  -     metroNIDAZOLE (METROGEL) 0.75 % vaginal gel; Place 1 applicator (5 g) vaginally daily for 5 days.  Vaginal discharge  -     Wet prep - Clinic Collect  Screen for STD (sexually transmitted disease)  -     Chlamydia & Gonorrhea by PCR, GICH/Range - Clinic Collect  -     Treponema Abs w Reflex to RPR and Titer  -     Hepatitis C antibody  -     HIV Antigen Antibody Combo Cascade      Vaginal discharge x 4 days. Wet prep positive for BV - Metrogel x 5 days, she prefers topical treatment and was treated with clindamycin suppositories 2 weeks ago.   STD panel pending. Empiric treatment not indicated.     At the end of the encounter, I discussed results, diagnosis, medications. Discussed red flags for immediate return to clinic/ER, as well as indications for follow up if no improvement. Patient and/or caregiver understood and agreed to plan. Patient was stable for discharge.    There are no Patient Instructions on file for this visit.    No follow-ups on file.    ------------------------------------------------------------------------  SUBJECTIVE  History was obtained from patient.    Patient presents with:  Vaginal Problem: Had BV 2 weeks ago and symptom come bake having odor and itchy and pt wants to get checked for STD.    HPI  Julita Fernandez is a(n) 23 year old female presenting to urgent care for vaginal discharge x 4 days. Reports itching and odor. She is on period now, so hard to tell if having abnormal discharge. No STD exposure but would like testing. She was treated for BV 2 weeks ago with clindamycin suppository, symptoms did resolve with treatment.    Review of Systems    Current Outpatient Medications   Medication Sig Dispense Refill    metroNIDAZOLE (METROGEL) 0.75 % vaginal gel Place 1 applicator (5 g) vaginally daily for 5 days. 25 g 0    sertraline (ZOLOFT) 100 MG tablet Take 1 tablet (100 mg) by mouth daily 90  tablet 1    cephALEXin (KEFLEX) 500 MG capsule 500 mg (Patient not taking: Reported on 4/15/2024)      cloNIDine (CATAPRES) 0.1 MG tablet Take 0.1 mg by mouth 2 times daily (Patient not taking: Reported on 9/17/2024)      gabapentin (NEURONTIN) 300 MG capsule Take 1 capsule (300 mg) by mouth 3 times daily as needed (severe anxiety) (Patient not taking: Reported on 4/15/2024) 60 capsule 3    hydrOXYzine (VISTARIL) 50 MG capsule Take 1 capsule (50 mg) by mouth 3 times daily as needed for anxiety 20 capsule 0    levonorgestrel (MIRENA) 20 mcg/24 hours (5 yrs) 52 mg IUD [LEVONORGESTREL (MIRENA) 20 MCG/24 HOURS (5 YRS) 52 MG IUD] by Intrauterine route once for 1 dose. 1 each 0    melatonin 5 MG CAPS Take 5 mg by mouth nightly as needed (Sleep) (Patient not taking: Reported on 4/15/2024)      naltrexone (VIVITROL) 380 MG SUSR Inject into the muscle once      ondansetron (ZOFRAN ODT) 4 MG ODT tab Take 1 tablet (4 mg) by mouth every 8 hours as needed for nausea (Patient not taking: Reported on 9/17/2024) 10 tablet 0    pantoprazole (PROTONIX) 40 MG EC tablet TAKE 1 TABLET BY MOUTH EVERY DAY (Patient not taking: Reported on 4/15/2024) 90 tablet 1    sertraline (ZOLOFT) 25 MG tablet Take 1 tablet (25 mg) by mouth daily for 7 days, THEN 2 tablets (50 mg) daily for 7 days, THEN 3 tablets (75 mg) daily for 7 days, THEN 4 tablets (100 mg) daily for 7 days. 70 tablet 0    traZODone (DESYREL) 50 MG tablet  (Patient not taking: Reported on 7/25/2024)       Problem List:  2023-10: MDD (major depressive disorder), recurrent episode, moderate   (H)  2023-09: Anxiety disorder, unspecified type  2023-09: Panic attack  2023-09: Alcohol abuse, episodic drinking behavior  2023-09: Alcohol abuse  2023-09: Anxiety  2023-09: Marijuana abuse  2023-08: Marijuana use  2023-08: Benzodiazepine withdrawal without complication (H)  2022-02: Sinus tachycardia  2022-02: Lactic acidosis  2022-02: H/O alcohol abuse  2022-02: Hypoglycemia  2022-02: High  anion gap metabolic acidosis  2022-02: UGIB (upper gastrointestinal bleed)  2021-12: Chest wall pain  2019-10: IUD (intrauterine device) in place  2019-09: Strain of neck muscle, initial encounter  2019-09: Strain of lumbar region, initial encounter  2019-09: Gastroesophageal reflux disease, esophagitis presence not   specified  2019-09: Encounter for other general counseling or advice on   contraception  2019-09: Generalized anxiety disorder    Allergies   Allergen Reactions    Soy [Isoflavones (Soy)] Other (See Comments)     Facial eczema          OBJECTIVE  Vitals:    10/10/24 1746   BP: 117/82   BP Location: Right arm   Patient Position: Sitting   Cuff Size: Adult Regular   Pulse: 96   Resp: 18   Temp: 98.3  F (36.8  C)   TempSrc: Oral   SpO2: 100%   Weight: 46.3 kg (102 lb)     Physical Exam   GENERAL: healthy, alert, no acute distress.   PSYCH: mentation appears normal. Normal affect    Results for orders placed or performed in visit on 10/10/24   Wet prep - Clinic Collect     Status: Abnormal    Specimen: Vagina; Swab   Result Value Ref Range    Trichomonas Absent Absent    Yeast Absent Absent    Clue Cells Present (A) Absent    WBCs/high power field 1+ (A) None

## 2024-10-11 LAB
C TRACH DNA SPEC QL PROBE+SIG AMP: NEGATIVE
HCV AB SERPL QL IA: NONREACTIVE
HIV 1+2 AB+HIV1 P24 AG SERPL QL IA: NONREACTIVE
N GONORRHOEA DNA SPEC QL NAA+PROBE: NEGATIVE
T PALLIDUM AB SER QL: NONREACTIVE

## 2024-10-21 ENCOUNTER — E-VISIT (OUTPATIENT)
Dept: URGENT CARE | Facility: CLINIC | Age: 24
End: 2024-10-21
Payer: COMMERCIAL

## 2024-10-21 DIAGNOSIS — N89.8 VAGINAL DISCHARGE: Primary | ICD-10-CM

## 2024-10-21 PROCEDURE — 99421 OL DIG E/M SVC 5-10 MIN: CPT | Performed by: FAMILY MEDICINE

## 2024-10-21 RX ORDER — FLUCONAZOLE 150 MG/1
150 TABLET ORAL ONCE
Qty: 1 TABLET | Refills: 0 | Status: SHIPPED | OUTPATIENT
Start: 2024-10-21 | End: 2024-10-21

## 2024-10-21 NOTE — PATIENT INSTRUCTIONS
Thank you for choosing us for your care. I have placed an order for a prescription so that you can start treatment. View your full visit summary for details by clicking on the link below. Your pharmacist will able to address any questions you may have about the medication.     If you re not feeling better within 2-3 days, please schedule an appointment.  You can schedule an appointment right here in [facility], or call [phone]  If the visit is for the same symptoms as your eVisit, we ll refund the cost of your eVisit if seen within seven days.    Vaginal Yeast Infection: Care Instructions  Overview     A vaginal yeast infection is the growth of too many yeast cells in the vagina. This is a common problem. Itching, vaginal discharge and irritation, and other symptoms can bother you. But yeast infections don't often cause other health problems.  Some medicines can increase your risk of getting a yeast infection. These include antibiotics, hormones, and steroids. You may also be more likely to get a yeast infection if you are pregnant, have diabetes, douche, or wear tight clothes.  With treatment, most yeast infections get better in a few days.  Follow-up care is a key part of your treatment and safety. Be sure to make and go to all appointments, and call your doctor if you are having problems. It's also a good idea to know your test results and keep a list of the medicines you take.  How can you care for yourself at home?  Take your medicines exactly as prescribed. Call your doctor if you think you are having a problem with your medicine.  Ask your doctor about over-the-counter (OTC) medicines for yeast infections. If you use an OTC treatment, read and follow all instructions on the label.  Don't use tampons while using a vaginal cream or suppository. The tampons can absorb the medicine. Use pads instead.  Wear loose cotton clothing. Don't wear nylon or other fabric that holds body heat and moisture close to the  "skin.  Try sleeping without underwear.  Don't scratch. Relieve itching with a cold pack or a cool bath.  Don't wash your vulva more than once a day. Use plain water or a mild, unscented soap. Air-dry the vulva.  Change out of wet or damp clothes as soon as possible.  If you are using a vaginal medicine, don't have sex until you have finished your treatment. But if you do have sex, don't depend on a condom or diaphragm for birth control. The oil in some vaginal medicines weakens latex.  Don't douche or use powders, sprays, or perfumes in your vagina or on your vulva. These items can change the normal balance of organisms in your vagina.  When should you call for help?   Call your doctor now or seek immediate medical care if:    You have new or increased pain in your vagina or pelvis.   Watch closely for changes in your health, and be sure to contact your doctor if:    You have unexpected vaginal bleeding.     You have a fever.     You are not getting better after 2 days.     Your symptoms come back after you finish your medicines.   Where can you learn more?  Go to https://www.[employer].net/patiented  Enter F639 in the search box to learn more about \"Vaginal Yeast Infection: Care Instructions.\"  Current as of: November 27, 2023  Content Version: 14.2 2024 [facility].   Care instructions adapted under license by your healthcare professional. If you have questions about a medical condition or this instruction, always ask your healthcare professional. [employer] disclaims any warranty or liability for your use of this information.    "

## 2024-10-26 ENCOUNTER — E-VISIT (OUTPATIENT)
Dept: URGENT CARE | Facility: CLINIC | Age: 24
End: 2024-10-26
Payer: COMMERCIAL

## 2024-10-26 DIAGNOSIS — B37.31 CANDIDAL VULVOVAGINITIS: Primary | ICD-10-CM

## 2024-10-26 PROCEDURE — 99207 PR NON-BILLABLE SERV PER CHARTING: CPT | Performed by: PHYSICIAN ASSISTANT

## 2024-10-26 RX ORDER — FLUCONAZOLE 150 MG/1
150 TABLET ORAL ONCE
Qty: 1 TABLET | Refills: 1 | Status: SHIPPED | OUTPATIENT
Start: 2024-10-26 | End: 2024-10-26

## 2024-10-26 NOTE — PATIENT INSTRUCTIONS
Thank you for choosing us for your care. I have placed an order for a prescription so that you can start treatment. View your full visit summary for details by clicking on the link below. Your pharmacist will able to address any questions you may have about the medication.     If you re not feeling better within 2-3 days, please schedule an appointment.  You can schedule an appointment right here in [facility], or call [phone]  If the visit is for the same symptoms as your eVisit, we ll refund the cost of your eVisit if seen within seven days.    Vaginal Yeast Infection: Care Instructions  Overview     A vaginal yeast infection is the growth of too many yeast cells in the vagina. This is a common problem. Itching, vaginal discharge and irritation, and other symptoms can bother you. But yeast infections don't often cause other health problems.  Some medicines can increase your risk of getting a yeast infection. These include antibiotics, hormones, and steroids. You may also be more likely to get a yeast infection if you are pregnant, have diabetes, douche, or wear tight clothes.  With treatment, most yeast infections get better in a few days.  Follow-up care is a key part of your treatment and safety. Be sure to make and go to all appointments, and call your doctor if you are having problems. It's also a good idea to know your test results and keep a list of the medicines you take.  How can you care for yourself at home?  Take your medicines exactly as prescribed. Call your doctor if you think you are having a problem with your medicine.  Ask your doctor about over-the-counter (OTC) medicines for yeast infections. If you use an OTC treatment, read and follow all instructions on the label.  Don't use tampons while using a vaginal cream or suppository. The tampons can absorb the medicine. Use pads instead.  Wear loose cotton clothing. Don't wear nylon or other fabric that holds body heat and moisture close to the  "skin.  Try sleeping without underwear.  Don't scratch. Relieve itching with a cold pack or a cool bath.  Don't wash your vulva more than once a day. Use plain water or a mild, unscented soap. Air-dry the vulva.  Change out of wet or damp clothes as soon as possible.  If you are using a vaginal medicine, don't have sex until you have finished your treatment. But if you do have sex, don't depend on a condom or diaphragm for birth control. The oil in some vaginal medicines weakens latex.  Don't douche or use powders, sprays, or perfumes in your vagina or on your vulva. These items can change the normal balance of organisms in your vagina.  When should you call for help?   Call your doctor now or seek immediate medical care if:    You have new or increased pain in your vagina or pelvis.   Watch closely for changes in your health, and be sure to contact your doctor if:    You have unexpected vaginal bleeding.     You have a fever.     You are not getting better after 2 days.     Your symptoms come back after you finish your medicines.   Where can you learn more?  Go to https://www.[employer].net/patiented  Enter F639 in the search box to learn more about \"Vaginal Yeast Infection: Care Instructions.\"  Current as of: November 27, 2023  Content Version: 14.2 2024 [employer].   Care instructions adapted under license by your healthcare professional. If you have questions about a medical condition or this instruction, always ask your healthcare professional. Healthwise[employer] disclaims any warranty or liability for your use of this information.    "

## 2024-11-04 ENCOUNTER — E-VISIT (OUTPATIENT)
Dept: URGENT CARE | Facility: CLINIC | Age: 24
End: 2024-11-04
Payer: COMMERCIAL

## 2024-11-04 DIAGNOSIS — R21 RASH: Primary | ICD-10-CM

## 2024-11-04 PROCEDURE — 99207 PR NON-BILLABLE SERV PER CHARTING: CPT | Performed by: PHYSICIAN ASSISTANT

## 2024-11-04 NOTE — PATIENT INSTRUCTIONS
Dear Julita Fernandez,    We are sorry you are not feeling well. Based on the responses you provided, it is recommended that you be seen in-person in urgent care so we can better evaluate your symptoms. Please click here to find the nearest urgent care location to you.   You will not be charged for this Visit. Thank you for trusting us with your care.    Ana Thrasher PA-C

## 2024-11-26 ENCOUNTER — OFFICE VISIT (OUTPATIENT)
Dept: FAMILY MEDICINE | Facility: CLINIC | Age: 24
End: 2024-11-26
Payer: COMMERCIAL

## 2024-11-26 VITALS
WEIGHT: 101 LBS | RESPIRATION RATE: 16 BRPM | HEIGHT: 66 IN | DIASTOLIC BLOOD PRESSURE: 85 MMHG | HEART RATE: 96 BPM | BODY MASS INDEX: 16.23 KG/M2 | OXYGEN SATURATION: 100 % | TEMPERATURE: 97.1 F | SYSTOLIC BLOOD PRESSURE: 128 MMHG

## 2024-11-26 DIAGNOSIS — F41.1 GENERALIZED ANXIETY DISORDER: ICD-10-CM

## 2024-11-26 DIAGNOSIS — Z12.4 CERVICAL CANCER SCREENING: ICD-10-CM

## 2024-11-26 DIAGNOSIS — F41.0 PANIC ATTACK: ICD-10-CM

## 2024-11-26 DIAGNOSIS — F33.1 MDD (MAJOR DEPRESSIVE DISORDER), RECURRENT EPISODE, MODERATE (H): ICD-10-CM

## 2024-11-26 DIAGNOSIS — B96.89 BACTERIAL VAGINOSIS: ICD-10-CM

## 2024-11-26 DIAGNOSIS — Z00.00 ANNUAL PHYSICAL EXAM: Primary | ICD-10-CM

## 2024-11-26 DIAGNOSIS — N76.0 BACTERIAL VAGINOSIS: ICD-10-CM

## 2024-11-26 PROBLEM — Z30.09 ENCOUNTER FOR OTHER GENERAL COUNSELING OR ADVICE ON CONTRACEPTION: Status: RESOLVED | Noted: 2019-09-30 | Resolved: 2024-11-26

## 2024-11-26 PROBLEM — F10.10 ALCOHOL ABUSE, EPISODIC DRINKING BEHAVIOR: Status: RESOLVED | Noted: 2023-09-19 | Resolved: 2024-11-26

## 2024-11-26 PROBLEM — F41.9 ANXIETY: Status: RESOLVED | Noted: 2023-09-19 | Resolved: 2024-11-26

## 2024-11-26 PROBLEM — F10.10 ALCOHOL ABUSE: Status: RESOLVED | Noted: 2023-09-19 | Resolved: 2024-11-26

## 2024-11-26 PROBLEM — F13.930 BENZODIAZEPINE WITHDRAWAL WITHOUT COMPLICATION (H): Status: RESOLVED | Noted: 2023-08-31 | Resolved: 2024-11-26

## 2024-11-26 PROBLEM — F41.9 ANXIETY DISORDER, UNSPECIFIED TYPE: Status: RESOLVED | Noted: 2023-09-19 | Resolved: 2024-11-26

## 2024-11-26 PROBLEM — F12.10 MARIJUANA ABUSE: Status: RESOLVED | Noted: 2023-09-19 | Resolved: 2024-11-26

## 2024-11-26 PROBLEM — F12.20 CANNABIS DEPENDENCE, DAILY USE (H): Chronic | Status: RESOLVED | Noted: 2023-12-11 | Resolved: 2024-11-26

## 2024-11-26 PROCEDURE — 99395 PREV VISIT EST AGE 18-39: CPT | Mod: 25 | Performed by: PHYSICIAN ASSISTANT

## 2024-11-26 PROCEDURE — 99214 OFFICE O/P EST MOD 30 MIN: CPT | Mod: 25 | Performed by: PHYSICIAN ASSISTANT

## 2024-11-26 PROCEDURE — 87210 SMEAR WET MOUNT SALINE/INK: CPT | Performed by: PHYSICIAN ASSISTANT

## 2024-11-26 PROCEDURE — 91320 SARSCV2 VAC 30MCG TRS-SUC IM: CPT | Performed by: PHYSICIAN ASSISTANT

## 2024-11-26 PROCEDURE — 90656 IIV3 VACC NO PRSV 0.5 ML IM: CPT | Performed by: PHYSICIAN ASSISTANT

## 2024-11-26 PROCEDURE — 90471 IMMUNIZATION ADMIN: CPT | Performed by: PHYSICIAN ASSISTANT

## 2024-11-26 PROCEDURE — 90480 ADMN SARSCOV2 VAC 1/ONLY CMP: CPT | Performed by: PHYSICIAN ASSISTANT

## 2024-11-26 RX ORDER — CLONIDINE HYDROCHLORIDE 0.1 MG/1
0.1 TABLET ORAL 2 TIMES DAILY
Status: SHIPPED
Start: 2024-11-26

## 2024-11-26 RX ORDER — METRONIDAZOLE 7.5 MG/G
1 GEL VAGINAL AT BEDTIME
Qty: 70 G | Refills: 1 | Status: SHIPPED | OUTPATIENT
Start: 2024-11-26 | End: 2024-12-01

## 2024-11-26 RX ORDER — METRONIDAZOLE 500 MG/1
500 TABLET ORAL 2 TIMES DAILY
Qty: 14 TABLET | Refills: 0 | Status: SHIPPED | OUTPATIENT
Start: 2024-11-26 | End: 2024-12-03

## 2024-11-26 RX ORDER — HYDROXYZINE PAMOATE 50 MG/1
50 CAPSULE ORAL 3 TIMES DAILY PRN
Qty: 45 CAPSULE | Refills: 1 | Status: SHIPPED | OUTPATIENT
Start: 2024-11-26

## 2024-11-26 SDOH — HEALTH STABILITY: PHYSICAL HEALTH: ON AVERAGE, HOW MANY DAYS PER WEEK DO YOU ENGAGE IN MODERATE TO STRENUOUS EXERCISE (LIKE A BRISK WALK)?: 4 DAYS

## 2024-11-26 ASSESSMENT — SOCIAL DETERMINANTS OF HEALTH (SDOH): HOW OFTEN DO YOU GET TOGETHER WITH FRIENDS OR RELATIVES?: THREE TIMES A WEEK

## 2024-11-26 NOTE — PROGRESS NOTES
SUBJECTIVE    Julita Fernandez is a 24 year old female who presents for an annual exam.    Other concerns today:  Generally doing well.  Has a therapist that she sees through [facility].  Current drug or alcohol use.  History of recurrent BV.  She has IUD for contraception.  She feels like her mental health is generally under good control.    Patient Active Problem List    Diagnosis Date Noted    Panic attack 09/19/2023     Priority: High    MDD (major depressive disorder), recurrent episode, moderate (H) 10/10/2023     Priority: Medium    Marijuana use 08/31/2023     Priority: Medium    H/O alcohol abuse 02/13/2022     Priority: Medium    UGIB (upper gastrointestinal bleed) 02/13/2022     Priority: Medium    IUD in place - Mirena (10/1/19) 10/01/2019     Priority: Medium     Mirena IUD placed 10/1/2019.      Gastroesophageal reflux disease, esophagitis presence not specified 09/30/2019     Priority: Medium    Generalized anxiety disorder 09/20/2019     Priority: Medium        Immunization History   Administered Date(s) Administered    COVID-19 12+ (Pfizer) 11/02/2023, 11/26/2024    COVID-19 Bivalent 12+ (Pfizer) 04/07/2023    COVID-19 MONOVALENT 12+ (Pfizer) 04/08/2021, 04/29/2021, 01/05/2022    Comvax (HIB/HepB) 01/16/2003, 03/21/2003, 02/19/2004    DTAP (<7y) 01/16/2003, 03/21/2003, 05/16/2003, 02/19/2004, 11/14/2007    HEPATITIS A (PEDS 12M-18Y) 11/14/2007, 06/17/2010    HPV 08/21/2015, 03/15/2016    HPV Quadrivalent 12/18/2014    HPV9 08/21/2015, 03/15/2016    Influenza (IIV3) PF 11/19/2003, 10/29/2004, 09/21/2014    Influenza (prior to 2024) 11/14/2007, 09/16/2009    Influenza Vaccine >6 months,quad, PF 11/28/2006, 12/03/2013, 10/27/2014, 09/16/2016, 09/27/2017, 11/07/2018, 10/25/2020, 12/09/2021, 04/07/2023, 11/02/2023    Influenza Vaccine, 6+MO IM (QUADRIVALENT W/PRESERVATIVES) 10/01/2019    Influenza, Split Virus, Trivalent, Pf (Fluzone\Fluarix) 11/26/2024    MMR 11/19/2003, 11/14/2007    Meningococcal ACWY  (Menveo ) 12/18/2014, 12/14/2018    Meningococcal B (Bexsero ) 12/20/2022    Pneumo Conj 13-V (2010&after) 02/05/2003, 03/21/2003, 05/16/2003    Pneumococcal (PCV 7) 11/19/2003    Pneumococcal 20 valent Conjugate (Prevnar 20) 07/20/2022    Poliovirus, inactivated (IPV) 01/16/2003, 03/21/2003, 05/20/2004, 11/14/2007    TDAP (Adacel,Boostrix) 07/30/2013, 04/14/2022    Varicella 11/19/2003, 11/14/2007       Patient's last menstrual period was 11/19/2024 (exact date).  OB History   No obstetric history on file.       Current Outpatient Medications   Medication Sig Dispense Refill    cloNIDine (CATAPRES) 0.1 MG tablet Take 1 tablet (0.1 mg) by mouth 2 times daily.      hydrOXYzine (VISTARIL) 50 MG capsule Take 1 capsule (50 mg) by mouth 3 times daily as needed for anxiety. 45 capsule 1    sertraline (ZOLOFT) 100 MG tablet Take 1 tablet (100 mg) by mouth daily 90 tablet 1    levonorgestrel (MIRENA) 20 mcg/24 hours (5 yrs) 52 mg IUD [LEVONORGESTREL (MIRENA) 20 MCG/24 HOURS (5 YRS) 52 MG IUD] by Intrauterine route once for 1 dose. 1 each 0     No current facility-administered medications for this visit.       Past Medical History:   Diagnosis Date    Alcohol abuse, episodic drinking behavior 09/19/2023    Anxiety     Benzodiazepine withdrawal without complication (H) 08/31/2023    possibly benzo withdraw resulting in ER visit.      Cannabis dependence, daily use (H) 12/11/2023    Gastroesophageal reflux disease        No past surgical history on file.     Family History   Problem Relation Age of Onset    Substance Abuse Father     Depression Father     Substance Abuse Paternal Grandmother     Depression Paternal Grandmother               11/26/2024    10:53 AM   Additional Questions   Roomed by deepthi   Accompanied by self         11/26/2024   Declines Weight   Did patient decline having their weight taken? Yes          11/26/2024   General Health   How would you rate your overall physical health? (!) FAIR   Feel stress  (tense, anxious, or unable to sleep) Rather much      (!) STRESS CONCERN      11/26/2024   Nutrition   Three or more servings of calcium each day? Yes   Diet: Vegetarian/vegan   How many servings of fruit and vegetables per day? 4 or more   How many sweetened beverages each day? 0-1            11/26/2024   Exercise   Days per week of moderate/strenous exercise 4 days            11/26/2024   Social Factors   Frequency of gathering with friends or relatives Three times a week   Worry food won't last until get money to buy more No   Food not last or not have enough money for food? No   Do you have housing? (Housing is defined as stable permanent housing and does not include staying ouside in a car, in a tent, in an abandoned building, in an overnight shelter, or couch-surfing.) Yes   Are you worried about losing your housing? No   Lack of transportation? No   Unable to get utilities (heat,electricity)? No            11/26/2024   Dental   Dentist two times every year? Yes            9/17/2024   TB Screening   Were you born outside of the [place]? No            Today's PHQ-9 Score:       9/17/2024     2:35 PM   PHQ-9 SCORE   PHQ-9 Total Score MyChart 0   PHQ-9 Total Score 0           11/26/2024   Substance Use   Alcohol more than 3/day or more than 7/wk No   Do you use any other substances recreationally? No        Social History     Tobacco Use    Smoking status: Former     Types: Cigarettes    Smokeless tobacco: Never   Vaping Use    Vaping status: Every Day    Start date: 1/1/2020    Last attempt to quit: 5/1/2022    Substances: Nicotine, THC, Flavoring    Devices: Disposable, Pre-filled or refillable cartridge   Substance Use Topics    Alcohol use: Not Currently    Drug use: Yes     Frequency: 4.0 times per week     Types: Marijuana         11/26/2024   STI Screening   New sexual partner(s) since last STI/HIV test? No               11/26/2024   Contraception/Family Planning   Questions about contraception or family  "planning No      Reviewed and updated as needed this visit by Provider                  OBJECTIVE    Vitals:    11/26/24 1054   BP: 128/85   BP Location: Left arm   Patient Position: Sitting   Cuff Size: Adult Regular   Pulse: 96   Resp: 16   Temp: 97.1  F (36.2  C)   TempSrc: Temporal   SpO2: 100%   Weight: 45.8 kg (101 lb)   Height: 1.676 m (5' 6\")       Body mass index is 16.3 kg/m .    Physical Exam:  General: patient is alert and oriented x 3, in no apparent distress, appropriately groomed with normal affect  HEENT, Thyroid, Lymphatic, Cardiac, Pulmonary, GI, Musculoskeletal, Skin, and Neuro exams were completed today and grossly normal  Breast exam: Bilateral breasts are healthy and normal.  No masses, skin changes, nipple discharge, or lymphadenopathy noted.  Genitourinary: External genitalia is normal in appearance, vaginal walls are healthy, cervix is well visualized and normal in appearance, large amount of thick discharge present near the cervix.  Most of this was cleared with vaginal swabs.  IUD strings visualized.  Pap smear taken without difficulty.  Patient does have some hardened discharge present on the IUD strings, but it is not causing her any problems currently.  I was unable to remove this discharge material during exam.        Recent Results (from the past 24 hours)   Wet preparation    Collection Time: 11/26/24 11:40 AM    Specimen: Vagina; Swab   Result Value Ref Range    Trichomonas Absent Absent    Yeast Absent Absent    Clue Cells Present (A) Absent    WBCs/high power field 1+ (A) None     Other labs pending      ASSESSMENT and PLAN  1. Annual physical exam (Primary)  Health maintenance is discussed with patient as appropriate for age and risk factors.  Flu and COVID vaccines given today.  Pap smear completed today.  She has Mirena IUD for contraception.  She declines STD screening, she had negative STD screening in October 2024.  - Pap Screen Reflex to HPV if ASCUS - Recommended Age 25 - " 29 Years    2. Generalized anxiety disorder  3. MDD (major depressive disorder), recurrent episode, moderate (H)  4. Panic attack  Chronic, generally well-controlled.  She has a therapist through [facility] that she sees regularly.  No current drug or alcohol use.  Current mental health medications are sertraline, clonidine, and hydroxyzine as needed.  - hydrOXYzine (VISTARIL) 50 MG capsule; Take 1 capsule (50 mg) by mouth 3 times daily as needed for anxiety.  Dispense: 45 capsule; Refill: 1  - cloNIDine (CATAPRES) 0.1 MG tablet; Take 1 tablet (0.1 mg) by mouth 2 times daily.    5. Bacterial vaginosis - recurrent  New concern today.  Chronic problem.  We discussed some lifestyle modifications that may help prevent BV recurrence.  She does have a BV infection today from lab results.  Prescription sent for oral pills to take for current infection.  Then, if she would like to use MetroGel in the future as needed she can.  I discussed appropriate use.  - Wet preparation  - metroNIDAZOLE (FLAGYL) 500 MG tablet; Take 1 tablet (500 mg) by mouth 2 times daily for 7 days.  Dispense: 14 tablet; Refill: 0  - metroNIDAZOLE (METROGEL) 0.75 % vaginal gel; Place 1 applicator (5 g) vaginally at bedtime for 5 days. Use as needed  Dispense: 70 g; Refill: 1          This dictation uses voice recognition software, which may contain typographical errors.

## 2024-12-03 LAB
BKR LAB AP GYN ADEQUACY: NORMAL
BKR LAB AP GYN INTERPRETATION: NORMAL
BKR LAB AP HPV REFLEX: NORMAL
BKR LAB AP LMP: NORMAL
BKR LAB AP PREVIOUS ABNORMAL: NORMAL
PATH REPORT.COMMENTS IMP SPEC: NORMAL
PATH REPORT.COMMENTS IMP SPEC: NORMAL
PATH REPORT.RELEVANT HX SPEC: NORMAL

## 2024-12-06 ENCOUNTER — HOSPITAL ENCOUNTER (EMERGENCY)
Facility: HOSPITAL | Age: 24
Discharge: HOME | End: 2024-12-07
Attending: EMERGENCY MEDICINE | Admitting: EMERGENCY MEDICINE
Payer: COMMERCIAL

## 2024-12-06 VITALS
DIASTOLIC BLOOD PRESSURE: 90 MMHG | HEART RATE: 105 BPM | SYSTOLIC BLOOD PRESSURE: 136 MMHG | OXYGEN SATURATION: 97 % | RESPIRATION RATE: 21 BRPM | TEMPERATURE: 98.1 F | WEIGHT: 100 LBS | BODY MASS INDEX: 17.07 KG/M2 | HEIGHT: 64 IN

## 2024-12-06 DIAGNOSIS — R11.2 NAUSEA AND VOMITING, UNSPECIFIED VOMITING TYPE: ICD-10-CM

## 2024-12-06 DIAGNOSIS — E87.29 ALCOHOLIC KETOACIDOSIS: ICD-10-CM

## 2024-12-06 DIAGNOSIS — F17.200 TOBACCO USE DISORDER: ICD-10-CM

## 2024-12-06 DIAGNOSIS — F41.9 ANXIETY: ICD-10-CM

## 2024-12-06 DIAGNOSIS — F10.930 ALCOHOL WITHDRAWAL, UNCOMPLICATED (H): ICD-10-CM

## 2024-12-06 LAB
ALBUMIN SERPL BCG-MCNC: 5.1 G/DL (ref 3.5–5.2)
ALP SERPL-CCNC: 107 U/L (ref 40–150)
ALT SERPL W P-5'-P-CCNC: 36 U/L (ref 0–50)
ANION GAP SERPL CALCULATED.3IONS-SCNC: 23 MMOL/L (ref 7–15)
AST SERPL W P-5'-P-CCNC: 55 U/L (ref 0–45)
BILIRUB SERPL-MCNC: 0.3 MG/DL
BUN SERPL-MCNC: 10.5 MG/DL (ref 6–20)
CALCIUM SERPL-MCNC: 9.6 MG/DL (ref 8.8–10.4)
CHLORIDE SERPL-SCNC: 102 MMOL/L (ref 98–107)
CREAT SERPL-MCNC: 0.68 MG/DL (ref 0.51–0.95)
EGFRCR SERPLBLD CKD-EPI 2021: >90 ML/MIN/1.73M2
ERYTHROCYTE [DISTWIDTH] IN BLOOD BY AUTOMATED COUNT: 13.6 % (ref 10–15)
ETHANOL SERPL-MCNC: 0.06 G/DL
GLUCOSE SERPL-MCNC: 98 MG/DL (ref 70–99)
HCG SERPL QL: NEGATIVE
HCO3 SERPL-SCNC: 18 MMOL/L (ref 22–29)
HCT VFR BLD AUTO: 41.2 % (ref 35–47)
HGB BLD-MCNC: 14.3 G/DL (ref 11.7–15.7)
LIPASE SERPL-CCNC: 25 U/L (ref 13–60)
MCH RBC QN AUTO: 29.5 PG (ref 26.5–33)
MCHC RBC AUTO-ENTMCNC: 34.7 G/DL (ref 31.5–36.5)
MCV RBC AUTO: 85 FL (ref 78–100)
PLATELET # BLD AUTO: 369 10E3/UL (ref 150–450)
POTASSIUM SERPL-SCNC: 3.9 MMOL/L (ref 3.4–5.3)
PROT SERPL-MCNC: 8.1 G/DL (ref 6.4–8.3)
RBC # BLD AUTO: 4.85 10E6/UL (ref 3.8–5.2)
SODIUM SERPL-SCNC: 143 MMOL/L (ref 135–145)
WBC # BLD AUTO: 16.4 10E3/UL (ref 4–11)

## 2024-12-06 PROCEDURE — 96375 TX/PRO/DX INJ NEW DRUG ADDON: CPT

## 2024-12-06 PROCEDURE — 83690 ASSAY OF LIPASE: CPT | Performed by: EMERGENCY MEDICINE

## 2024-12-06 PROCEDURE — 82077 ASSAY SPEC XCP UR&BREATH IA: CPT | Performed by: EMERGENCY MEDICINE

## 2024-12-06 PROCEDURE — 36415 COLL VENOUS BLD VENIPUNCTURE: CPT | Performed by: STUDENT IN AN ORGANIZED HEALTH CARE EDUCATION/TRAINING PROGRAM

## 2024-12-06 PROCEDURE — 99284 EMERGENCY DEPT VISIT MOD MDM: CPT | Mod: 25

## 2024-12-06 PROCEDURE — 250N000011 HC RX IP 250 OP 636: Performed by: EMERGENCY MEDICINE

## 2024-12-06 PROCEDURE — 250N000013 HC RX MED GY IP 250 OP 250 PS 637: Performed by: EMERGENCY MEDICINE

## 2024-12-06 PROCEDURE — 96374 THER/PROPH/DIAG INJ IV PUSH: CPT | Mod: 59

## 2024-12-06 PROCEDURE — 84703 CHORIONIC GONADOTROPIN ASSAY: CPT | Performed by: STUDENT IN AN ORGANIZED HEALTH CARE EDUCATION/TRAINING PROGRAM

## 2024-12-06 PROCEDURE — 258N000003 HC RX IP 258 OP 636: Performed by: EMERGENCY MEDICINE

## 2024-12-06 PROCEDURE — 85014 HEMATOCRIT: CPT | Performed by: EMERGENCY MEDICINE

## 2024-12-06 PROCEDURE — 80053 COMPREHEN METABOLIC PANEL: CPT | Performed by: EMERGENCY MEDICINE

## 2024-12-06 RX ORDER — METRONIDAZOLE 500 MG/1
500 TABLET ORAL ONCE
Status: COMPLETED | OUTPATIENT
Start: 2024-12-06 | End: 2024-12-06

## 2024-12-06 RX ORDER — LORAZEPAM 2 MG/ML
0.5 INJECTION INTRAMUSCULAR ONCE
Status: COMPLETED | OUTPATIENT
Start: 2024-12-06 | End: 2024-12-06

## 2024-12-06 RX ORDER — KETOROLAC TROMETHAMINE 15 MG/ML
15 INJECTION, SOLUTION INTRAMUSCULAR; INTRAVENOUS ONCE
Status: COMPLETED | OUTPATIENT
Start: 2024-12-06 | End: 2024-12-06

## 2024-12-06 RX ORDER — ONDANSETRON 2 MG/ML
4 INJECTION INTRAMUSCULAR; INTRAVENOUS ONCE
Status: COMPLETED | OUTPATIENT
Start: 2024-12-06 | End: 2024-12-06

## 2024-12-06 RX ORDER — NITROFURANTOIN 25; 75 MG/1; MG/1
100 CAPSULE ORAL ONCE
Status: COMPLETED | OUTPATIENT
Start: 2024-12-06 | End: 2024-12-06

## 2024-12-06 RX ADMIN — METRONIDAZOLE 500 MG: 500 TABLET ORAL at 23:48

## 2024-12-06 RX ADMIN — LORAZEPAM 0.5 MG: 2 INJECTION INTRAMUSCULAR; INTRAVENOUS at 23:49

## 2024-12-06 RX ADMIN — KETOROLAC TROMETHAMINE 15 MG: 15 INJECTION, SOLUTION INTRAMUSCULAR; INTRAVENOUS at 23:48

## 2024-12-06 RX ADMIN — NITROFURANTOIN MONOHYDRATE/MACROCRYSTALS 100 MG: 75; 25 CAPSULE ORAL at 23:48

## 2024-12-06 RX ADMIN — FAMOTIDINE 20 MG: 10 INJECTION, SOLUTION INTRAVENOUS at 23:48

## 2024-12-06 RX ADMIN — SODIUM CHLORIDE 1000 ML: 9 INJECTION, SOLUTION INTRAVENOUS at 23:39

## 2024-12-06 RX ADMIN — ONDANSETRON 4 MG: 2 INJECTION INTRAMUSCULAR; INTRAVENOUS at 23:48

## 2024-12-06 ASSESSMENT — COLUMBIA-SUICIDE SEVERITY RATING SCALE - C-SSRS
5. HAVE YOU STARTED TO WORK OUT OR WORKED OUT THE DETAILS OF HOW TO KILL YOURSELF? DO YOU INTEND TO CARRY OUT THIS PLAN?: NO
2. HAVE YOU ACTUALLY HAD ANY THOUGHTS OF KILLING YOURSELF IN THE PAST MONTH?: YES
4. HAVE YOU HAD THESE THOUGHTS AND HAD SOME INTENTION OF ACTING ON THEM?: NO
6. HAVE YOU EVER DONE ANYTHING, STARTED TO DO ANYTHING, OR PREPARED TO DO ANYTHING TO END YOUR LIFE?: NO
3. HAVE YOU BEEN THINKING ABOUT HOW YOU MIGHT KILL YOURSELF?: NO
1. IN THE PAST MONTH, HAVE YOU WISHED YOU WERE DEAD OR WISHED YOU COULD GO TO SLEEP AND NOT WAKE UP?: YES

## 2024-12-07 PROCEDURE — 96361 HYDRATE IV INFUSION ADD-ON: CPT

## 2024-12-07 RX ORDER — CLONIDINE HYDROCHLORIDE 0.1 MG/1
0.1 TABLET ORAL DAILY PRN
COMMUNITY

## 2024-12-07 NOTE — MEDICATION SCRIBE - ADMISSION MEDICATION HISTORY
Medication Scribe Admission Medication History    Admission medication history is complete. The information provided in this note is only as accurate as the sources available at the time of the update.    Information Source(s): Facility (U/NH/) medication list/MAR via phone    Pertinent Information: Patient states that Zoloft dose have changed from 100 mg daily to 50 mg daily.    Changes made to PTA medication list:  Added: None  Deleted: None  Changed: Sertraline from 100 mg daily to 50 mg daily (per patient)    Allergies reviewed with patient and updates made in EHR: yes    Medication History Completed By: Hue Shah 12/7/2024 12:28 AM    PTA Med List   Medication Sig Last Dose/Taking    cloNIDine (CATAPRES) 0.1 MG tablet Take 0.1 mg by mouth daily as needed. Taking As Needed    hydrOXYzine (VISTARIL) 50 MG capsule Take 1 capsule (50 mg) by mouth 3 times daily as needed for anxiety. Taking As Needed    sertraline (ZOLOFT) 50 MG tablet Take 50 mg by mouth daily. 12/5/2024 Morning
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
## 2024-12-07 NOTE — DISCHARGE INSTRUCTIONS
You were seen in the Emergency Department today for alcohol withdrawal, anxiety, and vomiting.      Please continue to take all of your medications as prescribed and use the support and resources that you have for alcohol abuse as you have been.      Please return to the ER if you experience inability to keep food or fluids down, seizures, difficulty breathing, thoughts of harming yourself or others, and/or for any other new or concerning symptoms, otherwise please follow up with your primary doctor for recheck.     Below is some information you might find useful.     Thank you for choosing [facility]. It was a pleasure taking care of you today!  - Dr. Radha Smiley

## 2024-12-07 NOTE — ED TRIAGE NOTES
Brought to ED by mother for evaluation of alcohol withdrawal. Having N/V. Mother stated patient has had at least 11 episodes of emesis since 1700 hours today. Stated last drink was at 0200 hours today. Thinks she drank about 6 shots of vodka. Reports having high anxiety. Tearful and weeping in triage.     Triage Assessment (Adult)       Row Name 12/06/24 5413          Triage Assessment    Airway WDL WDL        Respiratory WDL    Respiratory WDL WDL        Skin Circulation/Temperature WDL    Skin Circulation/Temperature WDL WDL        Cardiac WDL    Cardiac WDL X  slighty tachy        Peripheral/Neurovascular WDL    Peripheral Neurovascular WDL WDL        Cognitive/Neuro/Behavioral WDL    Cognitive/Neuro/Behavioral WDL WDL

## 2025-01-27 ENCOUNTER — E-VISIT (OUTPATIENT)
Dept: FAMILY MEDICINE | Facility: CLINIC | Age: 25
End: 2025-01-27
Payer: COMMERCIAL

## 2025-01-27 DIAGNOSIS — F41.1 GENERALIZED ANXIETY DISORDER: Primary | ICD-10-CM

## 2025-01-27 PROCEDURE — 99207 PR NON-BILLABLE SERV PER CHARTING: CPT | Performed by: PHYSICIAN ASSISTANT

## 2025-01-27 ASSESSMENT — ANXIETY QUESTIONNAIRES
7. FEELING AFRAID AS IF SOMETHING AWFUL MIGHT HAPPEN: NOT AT ALL
IF YOU CHECKED OFF ANY PROBLEMS ON THIS QUESTIONNAIRE, HOW DIFFICULT HAVE THESE PROBLEMS MADE IT FOR YOU TO DO YOUR WORK, TAKE CARE OF THINGS AT HOME, OR GET ALONG WITH OTHER PEOPLE: SOMEWHAT DIFFICULT
8. IF YOU CHECKED OFF ANY PROBLEMS, HOW DIFFICULT HAVE THESE MADE IT FOR YOU TO DO YOUR WORK, TAKE CARE OF THINGS AT HOME, OR GET ALONG WITH OTHER PEOPLE?: SOMEWHAT DIFFICULT
3. WORRYING TOO MUCH ABOUT DIFFERENT THINGS: SEVERAL DAYS
2. NOT BEING ABLE TO STOP OR CONTROL WORRYING: NOT AT ALL
GAD7 TOTAL SCORE: 1
1. FEELING NERVOUS, ANXIOUS, OR ON EDGE: NOT AT ALL
GAD7 TOTAL SCORE: 1
7. FEELING AFRAID AS IF SOMETHING AWFUL MIGHT HAPPEN: NOT AT ALL
5. BEING SO RESTLESS THAT IT IS HARD TO SIT STILL: NOT AT ALL
6. BECOMING EASILY ANNOYED OR IRRITABLE: NOT AT ALL
GAD7 TOTAL SCORE: 1
4. TROUBLE RELAXING: NOT AT ALL

## 2025-01-27 ASSESSMENT — PATIENT HEALTH QUESTIONNAIRE - PHQ9
10. IF YOU CHECKED OFF ANY PROBLEMS, HOW DIFFICULT HAVE THESE PROBLEMS MADE IT FOR YOU TO DO YOUR WORK, TAKE CARE OF THINGS AT HOME, OR GET ALONG WITH OTHER PEOPLE: NOT DIFFICULT AT ALL
SUM OF ALL RESPONSES TO PHQ QUESTIONS 1-9: 2
SUM OF ALL RESPONSES TO PHQ QUESTIONS 1-9: 2

## 2025-01-28 RX ORDER — CLONIDINE HYDROCHLORIDE 0.1 MG/1
0.1 TABLET ORAL DAILY PRN
Qty: 30 TABLET | Refills: 2 | Status: SHIPPED | OUTPATIENT
Start: 2025-01-28

## 2025-01-28 RX ORDER — HYDROXYZINE PAMOATE 50 MG/1
50 CAPSULE ORAL 3 TIMES DAILY PRN
Qty: 45 CAPSULE | Refills: 1 | Status: SHIPPED | OUTPATIENT
Start: 2025-01-28

## 2025-01-28 ASSESSMENT — PATIENT HEALTH QUESTIONNAIRE - PHQ9: SUM OF ALL RESPONSES TO PHQ QUESTIONS 1-9: 2

## 2025-01-28 NOTE — PATIENT INSTRUCTIONS
Thank you for choosing us for your care. I have placed an order for your treatment:   Orders Placed This Encounter   Medications     cloNIDine (CATAPRES) 0.1 MG tablet     Sig: Take 1 tablet (0.1 mg) by mouth daily as needed (for anxiety or sleep).     Dispense:  30 tablet     Refill:  2     hydrOXYzine (VISTARIL) 50 MG capsule     Sig: Take 1 capsule (50 mg) by mouth 3 times daily as needed for anxiety.     Dispense:  45 capsule     Refill:  1      View your full visit summary for details by clicking on the link below. Your pharmacist will able to address any questions you may have about the medication.      If you're not feeling better within 4-6 weeks please schedule an appointment.  You can schedule an appointment right here in [facility], or call [phone]  If the visit is for the same symptoms as your eVisit, we'll refund the cost of your eVisit if seen within seven days.

## 2025-02-14 ENCOUNTER — OFFICE VISIT (OUTPATIENT)
Dept: FAMILY MEDICINE | Facility: CLINIC | Age: 25
End: 2025-02-14
Payer: COMMERCIAL

## 2025-02-14 VITALS
WEIGHT: 106.5 LBS | HEIGHT: 65 IN | TEMPERATURE: 97.1 F | HEART RATE: 97 BPM | SYSTOLIC BLOOD PRESSURE: 143 MMHG | DIASTOLIC BLOOD PRESSURE: 106 MMHG | OXYGEN SATURATION: 97 % | RESPIRATION RATE: 16 BRPM | BODY MASS INDEX: 17.74 KG/M2

## 2025-02-14 DIAGNOSIS — F41.1 GENERALIZED ANXIETY DISORDER: ICD-10-CM

## 2025-02-14 DIAGNOSIS — R21 RASH AND NONSPECIFIC SKIN ERUPTION: Primary | ICD-10-CM

## 2025-02-14 DIAGNOSIS — F33.1 MDD (MAJOR DEPRESSIVE DISORDER), RECURRENT EPISODE, MODERATE (H): ICD-10-CM

## 2025-02-14 PROCEDURE — G2211 COMPLEX E/M VISIT ADD ON: HCPCS | Performed by: PHYSICIAN ASSISTANT

## 2025-02-14 PROCEDURE — 99214 OFFICE O/P EST MOD 30 MIN: CPT | Performed by: PHYSICIAN ASSISTANT

## 2025-02-14 RX ORDER — HYDROXYZINE PAMOATE 50 MG/1
50 CAPSULE ORAL 3 TIMES DAILY PRN
Qty: 45 CAPSULE | Refills: 1 | Status: SHIPPED | OUTPATIENT
Start: 2025-02-14

## 2025-02-14 RX ORDER — CLONIDINE HYDROCHLORIDE 0.1 MG/1
0.1 TABLET ORAL DAILY PRN
Qty: 30 TABLET | Refills: 2 | Status: SHIPPED | OUTPATIENT
Start: 2025-02-14

## 2025-02-14 NOTE — PROGRESS NOTES
Assessment & Plan     Rash and nonspecific skin eruption  -possible pityriasis?  Will send for derm eval  - Adult Dermatology  Referral; Future    MDD (major depressive disorder), recurrent episode, moderate (H)  -on sertraline which is helpful for mod  -sleep ok with clonidine and acute anxiety managed well with  hydroxyzine  - sertraline (ZOLOFT) 50 MG tablet; Take 1 tablet (50 mg) by mouth daily.    Generalized anxiety disorder  -has used clonidine in the past, will send again  -Side effects of medication(s) discussed as well as risks/benefits of taking    - cloNIDine (CATAPRES) 0.1 MG tablet; Take 1 tablet (0.1 mg) by mouth daily as needed (for anxiety or sleep).  - hydrOXYzine (VISTARIL) 50 MG capsule; Take 1 capsule (50 mg) by mouth 3 times daily as needed for anxiety.            Subjective   Julita is a 24 year old, presenting for the following health issues:  Derm Problem      2/14/2025     1:58 PM   Additional Questions   Roomed by Lily   Accompanied by self     -rash for the past 1-2 weeks  -small, pink, scaly lesions on the trunk, arms, proximal legs, neck and a few on the face  -tried OTC hydrocortisone without much help  -no fevers, no URI sxs, no ST.  No known exposures to others with similar rash.    -no lesions on the hands or feet, nothing in the mouth    -anxiety and depression are doing well with medications  -was initially prescribed these while at [facility], feels they work well for her    History of Present Illness       Reason for visit:  Posible Psoriasis or exams spots. Just want to make sure its not anything serious  Symptom onset:  More than a month  Symptoms include:  Small circular dry spots.  Symptom intensity:  Mild  Symptom progression:  Staying the same  Had these symptoms before:  No  What makes it better:  Hydrocortisone cream has made one of them go away   She is taking medications regularly.           Objective    BP (!) 145/94 (BP Location: Left arm, Patient Position:  "Sitting, Cuff Size: Adult Small)   Pulse 93   Temp 97.1  F (36.2  C) (Temporal)   Resp 16   Ht 1.64 m (5' 4.57\")   Wt 48.3 kg (106 lb 8 oz)   LMP 02/11/2025 (Approximate)   SpO2 97%   BMI 17.96 kg/m    Body mass index is 17.96 kg/m .  Physical Exam   GENERAL: alert and no distress  NECK: no adenopathy, no asymmetry, masses, or scars  RESP: lungs clear to auscultation - no rales, rhonchi or wheezes  CV: regular rate and rhythm, normal S1 S2, no S3 or S4, no murmur, click or rub, no peripheral edema  ABDOMEN: soft, nontender, no hepatosplenomegaly, no masses and bowel sounds normal  MS: no gross musculoskeletal defects noted, no edema  SKIN: small 2-5mm flat, scaly, erythematous lesions on the arms, legs, trunk and neck.            Signed Electronically by: Amy Alberts PA-C    "

## 2025-02-22 DIAGNOSIS — F41.1 GENERALIZED ANXIETY DISORDER: ICD-10-CM

## 2025-02-24 RX ORDER — HYDROXYZINE PAMOATE 50 MG/1
50 CAPSULE ORAL 3 TIMES DAILY PRN
Qty: 270 CAPSULE | Refills: 1 | Status: SHIPPED | OUTPATIENT
Start: 2025-02-24

## 2025-03-09 DIAGNOSIS — F33.1 MDD (MAJOR DEPRESSIVE DISORDER), RECURRENT EPISODE, MODERATE (H): ICD-10-CM

## 2025-04-21 ENCOUNTER — NURSE TRIAGE (OUTPATIENT)
Dept: FAMILY MEDICINE | Facility: CLINIC | Age: 25
End: 2025-04-21
Payer: COMMERCIAL

## 2025-04-21 NOTE — TELEPHONE ENCOUNTER
S-(situation): Pt went to use the bathroom, mistaken IUD for tampon and pulled it out.     B-(background): Had Mirena IUD placed in 2019. Has not had any issues with IUD.    A-(assessment): Pt report no vaginal bleeding no abd pain. She is very anxious, does have generalized Anxiety disorder. Reports increased HR, but more calm after talking to nurse.     R-(recommendations): Follow-up with clinician in one day. Appt scheduled with Dr Valenzuela on 4/22/25 at 4:40pm.     Komal COLLADO RN  [facility]        Reason for Disposition   Cannot feel IUD string or worried that IUD is not in right place (e.g., string longer than usual, can feel hard plastic of IUD)    Additional Information   Negative: Shock suspected (e.g., cold/pale/clammy skin, too weak to stand, low BP, rapid pulse)   Negative: Sounds like a life-threatening emergency to the triager   Negative: Abdominal pain and pregnant < 20 weeks   Negative: Vaginal bleeding and pregnant < 20 weeks   Negative: Abdominal pain is main symptom   Negative: Vaginal discharge is main symptom   Negative: SEVERE abdominal pain (e.g., excruciating) and present > 1 hour   Negative: SEVERE vaginal bleeding (e.g., soaking 2 pads or tampons per hour and present 2 or more hours; 1 menstrual cup every 2 hours)   Negative: SEVERE dizziness (e.g., unable to stand, requires support to walk, feels like passing out now)   Negative: Patient sounds very sick or weak to the triager   Negative: MODERATE vaginal bleeding (e.g., soaking 1 pad or tampon per hour and present > 6 hours; 1 menstrual cup every 6 hours)   Negative: Vaginal bleeding is WORSE than normal (e.g., heavier) and feeling weak or lightheaded (e.g., woozy, feeling like they might faint)   Negative: Constant abdominal pain and present > 2 hours   Negative: Caller has URGENT question and triager unable to answer question   Negative: MODERATE abdominal pain (e.g., does not interfere with normal activities) that comes and  "goes (cramps) and present > 24 hours (Exception: Pain with vomiting or diarrhea - see that protocol.)   Negative: MILD abdominal pain (e.g., does not interfere with normal activities) that comes and goes (cramps) and present > 48 hours    Answer Assessment - Initial Assessment Questions  1. TYPE: \"What type of IUD do you have?\"       Louisa  2. START DATE:  \"When was your IUD inserted?\" (e.g., date; weeks, months, years ago)       6 years ago  3. SYMPTOM: \"What is the main symptom (or question) you're concerned about?\"       Pt mistaken her IUD string for her tampon and pulled it out is very anxious, has been experiencing increased heart rate.    4. ONSET: \"When did the IUD came ou?\"      20 mins ago.   5. VAGINAL BLEEDING: \"Are you having any unusual vaginal bleeding?\"       No  6. ABDOMEN OR PELVIC PAIN: \"Are you having any pain in your abdomen or pelvic area?\" (Scale: 0, 1-10; none, mild, moderate, severe)      None  7. FEVER: \"Is there a fever?\" If Yes, ask: \"What is the temperature, how was it measured, and when did it start?\"      No   8. PREGNANCY: \"Are you concerned that you might be pregnant?\" \"When was your last menstrual period?\"      Yesterday.    Protocols used: Contraception - IUD Symptoms and [id number]    "

## 2025-04-22 ENCOUNTER — OFFICE VISIT (OUTPATIENT)
Dept: FAMILY MEDICINE | Facility: CLINIC | Age: 25
End: 2025-04-22
Payer: COMMERCIAL

## 2025-04-22 VITALS — SYSTOLIC BLOOD PRESSURE: 141 MMHG | DIASTOLIC BLOOD PRESSURE: 99 MMHG

## 2025-04-22 DIAGNOSIS — F41.1 GENERALIZED ANXIETY DISORDER: ICD-10-CM

## 2025-04-22 DIAGNOSIS — Z30.430 ENCOUNTER FOR INSERTION OF INTRAUTERINE CONTRACEPTIVE DEVICE: Primary | ICD-10-CM

## 2025-04-22 DIAGNOSIS — B96.89 BV (BACTERIAL VAGINOSIS): ICD-10-CM

## 2025-04-22 DIAGNOSIS — N76.0 BV (BACTERIAL VAGINOSIS): ICD-10-CM

## 2025-04-22 LAB — HCG UR QL: NEGATIVE

## 2025-04-22 PROCEDURE — 99213 OFFICE O/P EST LOW 20 MIN: CPT | Mod: 25 | Performed by: FAMILY MEDICINE

## 2025-04-22 PROCEDURE — 58300 INSERT INTRAUTERINE DEVICE: CPT | Performed by: FAMILY MEDICINE

## 2025-04-22 PROCEDURE — 81025 URINE PREGNANCY TEST: CPT | Performed by: FAMILY MEDICINE

## 2025-04-22 RX ORDER — METRONIDAZOLE 7.5 MG/G
1 GEL VAGINAL DAILY
Qty: 70 G | Refills: 1 | Status: SHIPPED | OUTPATIENT
Start: 2025-04-22

## 2025-04-22 RX ORDER — CLONIDINE HYDROCHLORIDE 0.1 MG/1
0.1 TABLET ORAL DAILY PRN
Qty: 30 TABLET | Refills: 2 | Status: SHIPPED | OUTPATIENT
Start: 2025-04-22

## 2025-04-22 NOTE — PROGRESS NOTES
{PROVIDER CHARTING PREFERENCE:178753}  GYN: Insert IUD    Date/Time: 4/22/2025 6:01 PM    Performed by: Shannan Solis MD  Authorized by: Shannan Solis MD    Consent:     Consent obtained:  Written and verbal        Subjective   Julita is a 24 year old, presenting for the following health issues:  IUD (Fell out yesterday replacing )      4/22/2025     5:28 PM   Additional Questions   Roomed by Anuradha     Contraception          {MA/LPN/RN Pre-Provider Visit Orders- hCG/UA/Strep (Optional):662651}  {SUPERLIST (Optional):018819}  {additonal problems for provider to add (Optional):963449}    {ROS Picklists (Optional):397618}      Objective    BP (!) 141/99 (BP Location: Left arm, Patient Position: Sitting, Cuff Size: Adult Regular)   LMP 04/20/2025 (Approximate)   There is no height or weight on file to calculate BMI.  Physical Exam   {Exam List (Optional):273933}    {Diagnostic Test Results (Optional):529415}        Signed Electronically by: SHANNAN SOLIS MD  {Email feedback regarding this note to [email or web address]   :276949}   tampon and pulled it out  No evidence of string on initial gyn exam today    Contraception  Pertinent negatives include no chills or fever.         Review of Systems   Constitutional:  Negative for chills and fever.   Genitourinary:  Positive for vaginal discharge. Negative for pelvic pain, vaginal bleeding and vaginal pain.   All other systems reviewed and are negative.          Objective    BP (!) 141/99 (BP Location: Left arm, Patient Position: Sitting, Cuff Size: Adult Regular)   LMP 04/20/2025 (Approximate)   There is no height or weight on file to calculate BMI.  Physical Exam  Vitals reviewed.   Constitutional:       General: She is not in acute distress.     Appearance: Normal appearance.   Genitourinary:     Comments: PELVIC EXAM:External genitalia: normal  Vaginal mucosa normal  Vaginal discharge: white  Speculum exam shows a normal appearing cervix .   Bimanual exam: Cervix closed, firm, non tender  to motion.   Neurological:      Mental Status: She is alert.            Results for orders placed or performed in visit on 04/22/25   HCG qualitative urine     Status: Normal   Result Value Ref Range    hCG Urine Qualitative Negative Negative           Signed Electronically by: ABHIJIT FUNG MD

## 2025-05-11 ASSESSMENT — ENCOUNTER SYMPTOMS
FEVER: 0
CHILLS: 0

## 2025-06-25 ENCOUNTER — OFFICE VISIT (OUTPATIENT)
Dept: URGENT CARE | Facility: URGENT CARE | Age: 25
End: 2025-06-25
Payer: COMMERCIAL

## 2025-06-25 VITALS
RESPIRATION RATE: 17 BRPM | BODY MASS INDEX: 18.55 KG/M2 | TEMPERATURE: 99.1 F | DIASTOLIC BLOOD PRESSURE: 71 MMHG | OXYGEN SATURATION: 96 % | HEART RATE: 82 BPM | SYSTOLIC BLOOD PRESSURE: 110 MMHG | WEIGHT: 110 LBS

## 2025-06-25 DIAGNOSIS — Z11.3 SCREEN FOR STD (SEXUALLY TRANSMITTED DISEASE): ICD-10-CM

## 2025-06-25 DIAGNOSIS — B96.89 BV (BACTERIAL VAGINOSIS): Primary | ICD-10-CM

## 2025-06-25 DIAGNOSIS — R30.0 BURNING WITH URINATION: ICD-10-CM

## 2025-06-25 DIAGNOSIS — N76.0 BV (BACTERIAL VAGINOSIS): Primary | ICD-10-CM

## 2025-06-25 LAB
ALBUMIN UR-MCNC: ABNORMAL MG/DL
APPEARANCE UR: CLEAR
BACTERIA #/AREA URNS HPF: ABNORMAL /HPF
BILIRUB UR QL STRIP: NEGATIVE
CLUE CELLS: PRESENT
COLOR UR AUTO: YELLOW
GLUCOSE UR STRIP-MCNC: NEGATIVE MG/DL
HGB UR QL STRIP: NEGATIVE
KETONES UR STRIP-MCNC: 40 MG/DL
LEUKOCYTE ESTERASE UR QL STRIP: NEGATIVE
MUCOUS THREADS #/AREA URNS LPF: PRESENT /LPF
NITRATE UR QL: NEGATIVE
PH UR STRIP: 7 [PH] (ref 5–7)
RBC #/AREA URNS AUTO: ABNORMAL /HPF
SP GR UR STRIP: 1.02 (ref 1–1.03)
SQUAMOUS #/AREA URNS AUTO: ABNORMAL /LPF
TRICHOMONAS, WET PREP: ABNORMAL
UROBILINOGEN UR STRIP-ACNC: 1 E.U./DL
WBC #/AREA URNS AUTO: ABNORMAL /HPF
WBC'S/HIGH POWER FIELD, WET PREP: ABNORMAL
YEAST, WET PREP: ABNORMAL

## 2025-06-25 PROCEDURE — 87591 N.GONORRHOEAE DNA AMP PROB: CPT | Performed by: STUDENT IN AN ORGANIZED HEALTH CARE EDUCATION/TRAINING PROGRAM

## 2025-06-25 PROCEDURE — 81001 URINALYSIS AUTO W/SCOPE: CPT | Performed by: STUDENT IN AN ORGANIZED HEALTH CARE EDUCATION/TRAINING PROGRAM

## 2025-06-25 PROCEDURE — 87491 CHLMYD TRACH DNA AMP PROBE: CPT | Performed by: STUDENT IN AN ORGANIZED HEALTH CARE EDUCATION/TRAINING PROGRAM

## 2025-06-25 PROCEDURE — 99214 OFFICE O/P EST MOD 30 MIN: CPT | Performed by: STUDENT IN AN ORGANIZED HEALTH CARE EDUCATION/TRAINING PROGRAM

## 2025-06-25 PROCEDURE — 87210 SMEAR WET MOUNT SALINE/INK: CPT | Performed by: STUDENT IN AN ORGANIZED HEALTH CARE EDUCATION/TRAINING PROGRAM

## 2025-06-25 NOTE — PROGRESS NOTES
ASSESSMENT & PLAN:   Diagnoses and all orders for this visit:  BV (bacterial vaginosis)  Burning with urination  -     Wet prep - Clinic Collect  -     UA Macroscopic with reflex to Microscopic and Culture - Clinic Collect  -     UA Microscopic with Reflex to Culture  Screen for STD (sexually transmitted disease)  -     Chlamydia & Gonorrhea by PCR, [facility] - Clinic Collect    UA negative for infection.   Wet prep positive for BV. She has MetroGel at home and declines prescription. Advised to use MetroGel x 5 days.  Gonorrhea and chlamydia test pending.    There are no Patient Instructions on file for this visit.    No follow-ups on file.    At the end of the encounter, I discussed results, diagnosis, medications. Discussed red flags for immediate return to clinic/ER, as well as indications for follow up if no improvement. Patient and/or caregiver understood and agreed to plan. Patient was stable for discharge.    ------------------------------------------------------------------------  SUBJECTIVE  History was obtained from patient.    Patient presents with:  Urgent Care  Urinary Problem: Pt presents with burning with urination, pelvic pressure, onset 2 days. Pt would like STD test done.     HPI  Julita Fernandez is a(n) 24 year old female presenting to urgent care for vaginal irritation and dysuria x 2 days. No vaginal discharge.    Current Outpatient Medications   Medication Sig Dispense Refill    cloNIDine (CATAPRES) 0.1 MG tablet Take 1 tablet (0.1 mg) by mouth 3 times daily. 270 tablet 2    hydrOXYzine (VISTARIL) 50 MG capsule TAKE 1 CAPSULE (50 MG) BY MOUTH 3 TIMES DAILY AS NEEDED FOR ANXIETY. 270 capsule 1    levonorgestrel (MIRENA) 20 mcg/24 hours (5 yrs) 52 mg IUD [LEVONORGESTREL (MIRENA) 20 MCG/24 HOURS (5 YRS) 52 MG IUD] by Intrauterine route once for 1 dose. 1 each 0    metroNIDAZOLE (METROGEL) 0.75 % vaginal gel Place 1 applicator (5 g) vaginally daily. 70 g 1    nicotine (NICORETTE) 2 MG gum Place 1  each (2 mg) inside cheek every hour as needed for nicotine withdrawal symptoms. 100 each 0    sertraline (ZOLOFT) 50 MG tablet TAKE 1 TABLET BY MOUTH EVERY DAY 90 tablet 2         OBJECTIVE  Vitals:    06/25/25 1806   BP: 110/71   Pulse: 82   Resp: 17   Temp: 99.1  F (37.3  C)   TempSrc: Tympanic   SpO2: 96%   Weight: 49.9 kg (110 lb)     Physical Exam   GENERAL: healthy, alert, no acute distress.   PSYCH: mentation appears normal. Normal affect    Results for orders placed or performed in visit on 06/25/25   UA Macroscopic with reflex to Microscopic and Culture - Clinic Collect     Status: Abnormal    Specimen: Urine, Clean Catch   Result Value Ref Range    Color Urine Yellow Colorless, Straw, Light Yellow, Yellow    Appearance Urine Clear Clear    Glucose Urine Negative Negative mg/dL    Bilirubin Urine Negative Negative    Ketones Urine 40 (A) Negative mg/dL    Specific Gravity Urine 1.020 1.003 - 1.035    Blood Urine Negative Negative    pH Urine 7.0 5.0 - 7.0    Protein Albumin Urine Trace (A) Negative mg/dL    Urobilinogen Urine 1.0 0.2, 1.0 E.U./dL    Nitrite Urine Negative Negative    Leukocyte Esterase Urine Negative Negative   UA Microscopic with Reflex to Culture     Status: Abnormal   Result Value Ref Range    Bacteria Urine Moderate (A) None Seen /HPF    RBC Urine 0-2 0-2 /HPF /HPF    WBC Urine 0-5 0-5 /HPF /HPF    Squamous Epithelials Urine Many (A) None Seen /LPF    Mucus Urine Present (A) None Seen /LPF    Narrative    Urine Culture not indicated   Wet prep - Clinic Collect     Status: Abnormal    Specimen: Vagina; Swab   Result Value Ref Range    Trichomonas Absent Absent    Yeast Absent Absent    Clue Cells Present (A) Absent    WBCs/high power field 1+ (A) None

## 2025-06-25 NOTE — PROGRESS NOTES
Urgent Care Clinic Visit    Chief Complaint   Patient presents with    Urgent Care    Urinary Problem     Pt presents with burning with urination, pelvic pressure, onset 2 days. Pt would like STD test done.                6/25/2025     6:06 PM   Additional Questions   Roomed by Marian Wyatt   Accompanied by nobody     Pre-Provider Visit Orders- Urinalysis UA/UC  Patient reports the following symptoms:  discomfort, pain or burning with urination  and feeling the need to urinate despite having an empty bladder   Does the patient report any of the following symptoms: vaginal discharge, vaginal itching, possible yeast infection, has a urinary catheter in place, or unable to void in a specimen cup?  No

## 2025-06-26 LAB
C TRACH DNA SPEC QL PROBE+SIG AMP: NEGATIVE
N GONORRHOEA DNA SPEC QL NAA+PROBE: NEGATIVE
SPECIMEN TYPE: NORMAL

## 2025-07-20 ENCOUNTER — E-VISIT (OUTPATIENT)
Dept: URGENT CARE | Facility: CLINIC | Age: 25
End: 2025-07-20
Payer: COMMERCIAL

## 2025-07-20 DIAGNOSIS — N89.8 VAGINAL DISCHARGE: Primary | ICD-10-CM

## 2025-07-20 PROCEDURE — 99207 PR NON-BILLABLE SERV PER CHARTING: CPT | Performed by: NURSE PRACTITIONER

## 2025-07-20 RX ORDER — FLUCONAZOLE 150 MG/1
150 TABLET ORAL ONCE
Qty: 1 TABLET | Refills: 0 | Status: SHIPPED | OUTPATIENT
Start: 2025-07-20 | End: 2025-07-20

## 2025-07-20 NOTE — PATIENT INSTRUCTIONS
Thank you for choosing us for your care. I have placed an order for a prescription so that you can start treatment. View your full visit summary for details by clicking on the link below. Your pharmacist will able to address any questions you may have about the medication.     If you re not feeling better within 2-3 days, please schedule an appointment.  You can schedule an appointment right here in [facility], or call [phone]  If the visit is for the same symptoms as your eVisit, we ll refund the cost of your eVisit if seen within seven days.    Vaginal Yeast Infection: Care Instructions  Overview     A vaginal yeast infection is the growth of too many yeast cells in the vagina. This is a common problem. Itching, vaginal discharge and irritation, and other symptoms can bother you. But yeast infections don't often cause other health problems.  Some medicines can increase your risk of getting a yeast infection. These include antibiotics, hormones, and steroids. You may also be more likely to get a yeast infection if you are pregnant, have diabetes, douche, or wear tight clothes.  With treatment, most yeast infections get better in a few days.  Follow-up care is a key part of your treatment and safety. Be sure to make and go to all appointments, and call your doctor if you are having problems. It's also a good idea to know your test results and keep a list of the medicines you take.  How can you care for yourself at home?  Take your medicines exactly as prescribed. Call your doctor if you think you are having a problem with your medicine.  Ask your doctor about over-the-counter (OTC) medicines for yeast infections. If you use an OTC treatment, read and follow all instructions on the label.  Don't use tampons while using a vaginal cream or suppository. The tampons can absorb the medicine. Use pads instead.  Wear loose cotton clothing. Don't wear nylon or other fabric that holds body heat and moisture close to the  "skin.  Try sleeping without underwear.  Don't scratch. Relieve itching with a cold pack or a cool bath.  Don't wash your vulva more than once a day. Use plain water or a mild, unscented soap. Air-dry the vulva.  Change out of wet or damp clothes as soon as possible.  If you are using a vaginal medicine, don't have sex until you have finished your treatment. But if you do have sex, don't depend on a condom or diaphragm for birth control. The oil in some vaginal medicines weakens latex.  Don't douche or use powders, sprays, or perfumes in your vagina or on your vulva. These items can change the normal balance of organisms in your vagina.  When should you call for help?   Call your doctor now or seek immediate medical care if:    You have new or increased pain in your vagina or pelvis.   Watch closely for changes in your health, and be sure to contact your doctor if:    You have unexpected vaginal bleeding.     You have a fever.     You are not getting better after 2 days.     Your symptoms come back after you finish your medicines.   Where can you learn more?  Go to https://www.[employer].net/patiented  Enter F639 in the search box to learn more about \"Vaginal Yeast Infection: Care Instructions.\"  Current as of: April 30, 2024  Content Version: 14.5 2024-2025 [employer].   Care instructions adapted under license by your healthcare professional. If you have questions about a medical condition or this instruction, always ask your healthcare professional. [employer] disclaims any warranty or liability for your use of this information.    "

## 2025-08-04 ENCOUNTER — E-VISIT (OUTPATIENT)
Dept: URGENT CARE | Facility: CLINIC | Age: 25
End: 2025-08-04
Payer: COMMERCIAL

## 2025-08-04 DIAGNOSIS — N39.0 ACUTE UTI (URINARY TRACT INFECTION): Primary | ICD-10-CM

## 2025-08-04 RX ORDER — NITROFURANTOIN 25; 75 MG/1; MG/1
100 CAPSULE ORAL 2 TIMES DAILY
Qty: 10 CAPSULE | Refills: 0 | Status: SHIPPED | OUTPATIENT
Start: 2025-08-04 | End: 2025-08-09